# Patient Record
Sex: FEMALE | Race: BLACK OR AFRICAN AMERICAN | Employment: FULL TIME | URBAN - METROPOLITAN AREA
[De-identification: names, ages, dates, MRNs, and addresses within clinical notes are randomized per-mention and may not be internally consistent; named-entity substitution may affect disease eponyms.]

---

## 2017-01-05 ENCOUNTER — OFFICE VISIT (OUTPATIENT)
Dept: FAMILY MEDICINE CLINIC | Age: 37
End: 2017-01-05

## 2017-01-05 VITALS
HEART RATE: 76 BPM | TEMPERATURE: 98.6 F | HEIGHT: 68 IN | SYSTOLIC BLOOD PRESSURE: 169 MMHG | BODY MASS INDEX: 44.41 KG/M2 | OXYGEN SATURATION: 97 % | DIASTOLIC BLOOD PRESSURE: 100 MMHG | WEIGHT: 293 LBS | RESPIRATION RATE: 18 BRPM

## 2017-01-05 DIAGNOSIS — F41.9 ANXIETY: ICD-10-CM

## 2017-01-05 DIAGNOSIS — I10 ESSENTIAL HYPERTENSION: ICD-10-CM

## 2017-01-05 DIAGNOSIS — K52.9 GASTROENTERITIS: ICD-10-CM

## 2017-01-05 DIAGNOSIS — R73.03 PREDIABETES: Primary | ICD-10-CM

## 2017-01-05 DIAGNOSIS — F32.A DEPRESSIVE DISORDER: ICD-10-CM

## 2017-01-05 RX ORDER — FLUOXETINE 10 MG/1
10 CAPSULE ORAL DAILY
Qty: 7 CAP | Refills: 0 | Status: SHIPPED | OUTPATIENT
Start: 2017-01-05 | End: 2017-01-20

## 2017-01-05 RX ORDER — ALBUTEROL SULFATE 90 UG/1
1 AEROSOL, METERED RESPIRATORY (INHALATION)
Qty: 1 INHALER | Refills: 2 | Status: SHIPPED | OUTPATIENT
Start: 2017-01-05 | End: 2017-04-17 | Stop reason: SDUPTHER

## 2017-01-05 RX ORDER — FLUOXETINE HYDROCHLORIDE 20 MG/1
20 CAPSULE ORAL DAILY
Qty: 30 CAP | Refills: 0 | Status: SHIPPED | OUTPATIENT
Start: 2017-01-05 | End: 2017-01-20

## 2017-01-05 RX ORDER — CLONAZEPAM 1 MG/1
1 TABLET ORAL 2 TIMES DAILY
Qty: 30 TAB | Refills: 1 | Status: SHIPPED | OUTPATIENT
Start: 2017-01-05 | End: 2017-02-10 | Stop reason: SDUPTHER

## 2017-01-05 RX ORDER — PROMETHAZINE HYDROCHLORIDE 25 MG/1
25 TABLET ORAL
Qty: 10 TAB | Refills: 0 | Status: SHIPPED | OUTPATIENT
Start: 2017-01-05 | End: 2017-01-20

## 2017-01-05 NOTE — LETTER
NOTIFICATION RETURN TO WORK 
 
1/5/2017 12:18 PM 
 
Ms. Brooklyn Oakes [address] [id number] To Whom It May Concern: 
 
Brooklyn Oakes is currently under the care of [address]. Please excuse Ms. Pako Redd from missing work Thursday January 5th and Friday January 6th, 2016 due to illness. If there are questions or concerns please have the patient contact our office.  
 
 
 
Sincerely, 
 
 
Blade Rice NP

## 2017-01-05 NOTE — PROGRESS NOTES
Chief Complaint   Patient presents with    Nausea    Vomiting    Diarrhea    Depression     Patient states sx n/v,and diarrhea began Saturday;have been treating with kaopectate; and possible depression began one week ago; pt states a lot of stress in home and work; pt scored a 24 on depression screening. 1. Have you been to the ER, urgent care clinic since your last visit? Hospitalized since your last visit? No    2. Have you seen or consulted any other health care providers outside of the [address] [place] [address] since your last visit? Include any pap smears or colon screening.  No

## 2017-01-05 NOTE — MR AVS SNAPSHOT
Visit Information Date & Time Provider Department Dept. Phone Encounter #  
 1/5/2017 11:30 AM Karyn Burgos NP [address] [facility] [phone] [id number] Follow-up Instructions Return in about 1 week (around 1/12/2017) for recheck BP and follow up. Upcoming Health Maintenance Date Due Pneumococcal 19-64 Medium Risk (1 of 1 - PPSV23) 1/13/1998 DTaP/Tdap/Td series (1 - Tdap) 1/13/2000 PAP AKA CERVICAL CYTOLOGY 1/13/2000 Allergies as of 1/5/2017  Review Complete On: 1/5/2017 By: Karyn Burgos NP Severity Noted Reaction Type Reactions Bactrim [Sulfamethoprim Ds]  12/16/2013    Hives Current Immunizations  Reviewed on 1/21/2016 No immunizations on file. Not reviewed this visit You Were Diagnosed With   
  
 Codes Comments Prediabetes    -  Primary ICD-10-CM: R73.03 
ICD-9-CM: 790.29 Essential hypertension     ICD-10-CM: I10 
ICD-9-CM: 401.9 Depressive disorder     ICD-10-CM: F32.9 ICD-9-CM: 547 Anxiety     ICD-10-CM: F41.9 ICD-9-CM: 300.00 Gastroenteritis     ICD-10-CM: K52.9 ICD-9-CM: 558. 9 Vitals BP Pulse Temp Resp Height(growth percentile) Weight(growth percentile) (!) 169/100 (BP 1 Location: Left arm, BP Patient Position: Sitting) 76 98.6 °F (37 °C) (Oral) 18 5' 8\" (1.727 m) 316 lb (143.3 kg) LMP SpO2 BMI OB Status Smoking Status 12/06/2016 97% 48.05 kg/m2 Having regular periods Former Smoker Vitals History BMI and BSA Data Body Mass Index Body Surface Area 48.05 kg/m 2 2.62 m 2 Preferred Pharmacy Pharmacy Name Phone [id number], [address] [facility] [phone] Your Updated Medication List  
  
   
This list is accurate as of: 1/5/17 12:06 PM.  Always use your most recent med list.  
  
  
  
  
 albuterol 90 mcg/actuation inhaler Commonly known as:  VENTOLIN HFA  
 Take 1 Puff by inhalation every four (4) hours as needed for Wheezing. amLODIPine 5 mg tablet Commonly known as:  Hoyt Del Take 1 Tab by mouth daily. clonazePAM 1 mg tablet Commonly known as:  Shirley Herndon Take 1 Tab by mouth two (2) times a day. - must last 30 days * FLUoxetine 10 mg capsule Commonly known as:  PROzac Take 1 Cap by mouth daily. * FLUoxetine 20 mg capsule Commonly known as:  PROzac Take 1 Cap by mouth daily. promethazine 25 mg tablet Commonly known as:  PHENERGAN Take 1 Tab by mouth every eight (8) hours as needed for Nausea. * Notice: This list has 2 medication(s) that are the same as other medications prescribed for you. Read the directions carefully, and ask your doctor or other care provider to review them with you. Prescriptions Printed Refills  
 clonazePAM (KLONOPIN) 1 mg tablet 1 Sig: Take 1 Tab by mouth two (2) times a day. - must last 30 days Class: Print Route: Oral  
  
Prescriptions Sent to Pharmacy Refills FLUoxetine (PROZAC) 10 mg capsule 0 Sig: Take 1 Cap by mouth daily. Class: Normal  
 Pharmacy: [facility] Ph #: [phone] Route: Oral  
 FLUoxetine (PROZAC) 20 mg capsule 0 Sig: Take 1 Cap by mouth daily. Class: Normal  
 Pharmacy: [facility] Ph #: [phone] Route: Oral  
 albuterol (VENTOLIN HFA) 90 mcg/actuation inhaler 2 Sig: Take 1 Puff by inhalation every four (4) hours as needed for Wheezing. Class: Normal  
 Pharmacy: [facility] Ph #: [phone] Route: Inhalation  
 promethazine (PHENERGAN) 25 mg tablet 0 Sig: Take 1 Tab by mouth every eight (8) hours as needed for Nausea. Class: Normal  
 Pharmacy: [facility] Ph #: [phone]  Route: Oral  
  
 Follow-up Instructions Return in about 1 week (around 1/12/2017) for recheck BP and follow up. Patient Instructions Benzodiazepines: Potential side effects of benzodiazepine medications include, but are not limited to, the possibility of \"paradoxical agitation\" with irritability, aggressiveness or stimulated behavior; clumsiness, slurring of speech, dulled facies, psychomotor impairment, anterograde amnesia, impaired awareness of degree of drug effect, visual and hearing sensitivity impairment, other psychiatric/behavioral disturbances, impacts operating certain machinery or engaging in certain activities or employment, anxiety, insomnia, anorexia, tremor, nausea, vomiting, diarrhea and potential to develop tolerance, dependence, addiction and death from overdose. Use caution while taking benzodiazepines. There is a potential to overdose on this medication when too many pills are taken at one time. Do not mix alcohol with this medication because it can worsen side effects and be very dangerous. Fluoxetine (By mouth) Fluoxetine (kovg-FE-e-teen) Treats depression, obsessive-compulsive disorder (OCD), bulimia nervosa, and panic disorder. This medicine is an SSRI. Brand Name(s):FLUoxetine HCl, PROzac, PROzac Weekly, Sarafem There may be other brand names for this medicine. When This Medicine Should Not Be Used: This medicine is not right for everyone. Do not use it if you had an allergic reaction to fluoxetine. How to Use This Medicine:  
Capsule, Delayed Release Capsule, Liquid, Tablet · Take your medicine as directed. Your dose may need to be changed several times to find what works best for you. Take your medicine at the same time each day. · You may need to take this medicine for a month or longer before you feel better. If you feel that the medicine is not working well, do not take more than your normal dose. Tell your doctor. · Measure the oral liquid medicine with a marked measuring spoon, oral syringe, or medicine cup. · Delayed-release capsule: Swallow whole. Do not crush, break, or chew the capsule. · This medicine should come with a Medication Guide. Ask your pharmacist for a copy if you do not have one. · Missed dose:  
¨ Every day dose (Prozac® or Sarafem®): If you miss a dose or forget to take your medicine, take it as soon as you can. If it is almost time for your next dose, wait until then to take the medicine and skip the missed dose. Do not use extra medicine to make up for a missed dose. ¨ Once-a-week dose [facility]): If you miss a dose or forget to take your medicine, take it as soon as you can. Then go back to your regular schedule the next week. Do not use extra medicine to make up for a missed dose. · Store the medicine in a closed container at room temperature, away from heat, moisture, and direct light. Drugs and Foods to Avoid: Ask your doctor or pharmacist before using any other medicine, including over-the-counter medicines, vitamins, and herbal products. · Do not use this medicine together with pimozide or thioridazine. Do not use this medicine within 14 days of using an MAO inhibitor (MAOI), and do not start an MAOI for at least 5 weeks after you stop using fluoxetine. · Some foods and medicines can affect how fluoxetine works. Tell your doctor if you are using any of the following: 
¨ Buspirone, dolasetron, fentanyl, lithium, mefloquine, methadone, pentamidine, probucol, Denice's wort, tacrolimus, tramadol, tryptophan supplement, or vinblastine ¨ Other medicine to treat depression, medicine to treat migraine headaches, medicine to treat mental illness, an antibiotic, medicine for seizures (such as carbamazepine, phenytoin), a diuretic (water pill), medicine for heart rhythm problems, an NSAID pain or arthritis medicine (such as aspirin, celecoxib, diclofenac, ibuprofen, naproxen), or a blood thinner (such as warfarin) · Do not drink alcohol while you are using this medicine. · Tell your doctor if you use anything else that makes you sleepy. Some examples are allergy medicine, narcotic pain medicine, and alcohol. Warnings While Using This Medicine: · Tell your doctor if you are pregnant or breastfeeding, or if you have kidney disease, liver disease, bleeding problems, diabetes, or a history of seizures. Tell your doctor if you have had heart disease, a heart rhythm problem (such as QT prolongation), heart attack, heart failure, low blood pressure, or a stroke. · For some children, teenagers, and young adults, this medicine may increase mental or emotional problems. This may lead to thoughts of suicide and violence. Talk with your doctor right away if you have any thoughts or behavior changes that concern you. Tell your doctor if you or anyone in your family has a history of bipolar disorder or suicide attempts. · This medicine may cause the following problems: 
¨ Serotonin syndrome (may be life-threatening when used with certain other medicines) ¨ Higher risk of bleeding ¨ Low sodium levels in the blood ¨ Heart rhythm changes · Do not stop using this medicine suddenly. Your doctor will need to slowly decrease your dose before you stop it completely. · This medicine may make you dizzy or drowsy. Do not drive or do anything else that could be dangerous until you know how this medicine affects you. · Keep all medicine out of the reach of children. Never share your medicine with anyone. Possible Side Effects While Using This Medicine:  
Call your doctor right away if you notice any of these side effects: · Allergic reaction: Itching or hives, swelling in your face or hands, swelling or tingling in your mouth or throat, chest tightness, trouble breathing · Anxiety, restlessness, fever, sweating, muscle spasms, nausea, vomiting, diarrhea, seeing or hearing things that are not there · Confusion, weakness, and muscle twitching · Eye pain, trouble seeing, blurry vision · Fast, pounding, or uneven heartbeat, dizziness · Feeling more excited or energetic than usual, trouble sleeping · Seizures · Skin rash, blisters, peeling, or redness · Trouble breathing · Unusual behavior, thoughts of hurting yourself or others · Unusual bleeding or bruising If you notice these less serious side effects, talk with your doctor: · Mild trouble sleeping, shaking · Nausea, changes in appetite, weight gain or loss · Sleepiness or unusual drowsiness · Sexual problems · Unusual dreams If you notice other side effects that you think are caused by this medicine, tell your doctor. Call your doctor for medical advice about side effects. You may report side effects to FDA at 3-716-ZUV-9499 © 2016 [address] is for End User's use only and may not be sold, redistributed or otherwise used for commercial purposes. The above information is an  only. It is not intended as medical advice for individual conditions or treatments. Talk to your doctor, nurse or pharmacist before following any medical regimen to see if it is safe and effective for you. Introducing [facility]! [employer] introduces [employer] patient portal. Now you can access parts of your medical record, email your doctor's office, and request medication refills online. 1. In your internet browser, go to [email or web address] [email or web address] 2. Click on the First Time User? Click Here link in the Sign In box. You will see the New Member Sign Up page. 3. Enter your [employer] Access Code exactly as it appears below. You will not need to use this code after youve completed the sign-up process. If you do not sign up before the expiration date, you must request a new code. · [employer] Access Code: [email or web address] Expires: 4/5/2017 12:06 PM 
 
 4. Enter the last four digits of your Social Security Number (xxxx) and Date of Birth (mm/dd/yyyy) as indicated and click Submit. You will be taken to the next sign-up page. 5. Create a Ovuline ID. This will be your [employer] login ID and cannot be changed, so think of one that is secure and easy to remember. 6. Create a [employer] password. You can change your password at any time. 7. Enter your Password Reset Question and Answer. This can be used at a later time if you forget your password. 8. Enter your e-mail address. You will receive e-mail notification when new information is available in [address] 9. Click Sign Up. You can now view and download portions of your medical record. 10. Click the Download Summary menu link to download a portable copy of your medical information. If you have questions, please visit the Frequently Asked Questions section of the [employer] website. Remember, [employer] is NOT to be used for urgent needs. For medical emergencies, dial 911. Now available from your iPhone and Android! Please provide this summary of care documentation to your next provider. Your primary care clinician is listed as Lionel Betts. If you have any questions after today's visit, please call [phone].

## 2017-01-05 NOTE — PATIENT INSTRUCTIONS
Benzodiazepines: Potential side effects of benzodiazepine medications include, but are not limited to, the possibility of \"paradoxical agitation\" with irritability, aggressiveness or stimulated behavior; clumsiness, slurring of speech, dulled facies, psychomotor impairment, anterograde amnesia, impaired awareness of degree of drug effect, visual and hearing sensitivity impairment, other psychiatric/behavioral disturbances, impacts operating certain machinery or engaging in certain activities or employment, anxiety, insomnia, anorexia, tremor, nausea, vomiting, diarrhea and potential to develop tolerance, dependence, addiction and death from overdose. Use caution while taking benzodiazepines. There is a potential to overdose on this medication when too many pills are taken at one time. Do not mix alcohol with this medication because it can worsen side effects and be very dangerous. Fluoxetine (By mouth)   Fluoxetine (hlet-QS-v-teen)  Treats depression, obsessive-compulsive disorder (OCD), bulimia nervosa, and panic disorder. This medicine is an SSRI. Brand Name(s):FLUoxetine HCl, PROzac, PROzac Weekly, Sarafem   There may be other brand names for this medicine. When This Medicine Should Not Be Used: This medicine is not right for everyone. Do not use it if you had an allergic reaction to fluoxetine. How to Use This Medicine:   Capsule, Delayed Release Capsule, Liquid, Tablet  · Take your medicine as directed. Your dose may need to be changed several times to find what works best for you. Take your medicine at the same time each day. · You may need to take this medicine for a month or longer before you feel better. If you feel that the medicine is not working well, do not take more than your normal dose. Tell your doctor. · Measure the oral liquid medicine with a marked measuring spoon, oral syringe, or medicine cup. · Delayed-release capsule: Swallow whole.  Do not crush, break, or chew the capsule. · This medicine should come with a Medication Guide. Ask your pharmacist for a copy if you do not have one. · Missed dose:   ¨ Every day dose (Prozac® or Sarafem®): If you miss a dose or forget to take your medicine, take it as soon as you can. If it is almost time for your next dose, wait until then to take the medicine and skip the missed dose. Do not use extra medicine to make up for a missed dose. ¨ Once-a-week dose [facility]): If you miss a dose or forget to take your medicine, take it as soon as you can. Then go back to your regular schedule the next week. Do not use extra medicine to make up for a missed dose. · Store the medicine in a closed container at room temperature, away from heat, moisture, and direct light. Drugs and Foods to Avoid:   Ask your doctor or pharmacist before using any other medicine, including over-the-counter medicines, vitamins, and herbal products. · Do not use this medicine together with pimozide or thioridazine. Do not use this medicine within 14 days of using an MAO inhibitor (MAOI), and do not start an MAOI for at least 5 weeks after you stop using fluoxetine. · Some foods and medicines can affect how fluoxetine works. Tell your doctor if you are using any of the following:  ¨ Buspirone, dolasetron, fentanyl, lithium, mefloquine, methadone, pentamidine, probucol, Denice's wort, tacrolimus, tramadol, tryptophan supplement, or vinblastine  ¨ Other medicine to treat depression, medicine to treat migraine headaches, medicine to treat mental illness, an antibiotic, medicine for seizures (such as carbamazepine, phenytoin), a diuretic (water pill), medicine for heart rhythm problems, an NSAID pain or arthritis medicine (such as aspirin, celecoxib, diclofenac, ibuprofen, naproxen), or a blood thinner (such as warfarin)  · Do not drink alcohol while you are using this medicine. · Tell your doctor if you use anything else that makes you sleepy.  Some examples are allergy medicine, narcotic pain medicine, and alcohol. Warnings While Using This Medicine:   · Tell your doctor if you are pregnant or breastfeeding, or if you have kidney disease, liver disease, bleeding problems, diabetes, or a history of seizures. Tell your doctor if you have had heart disease, a heart rhythm problem (such as QT prolongation), heart attack, heart failure, low blood pressure, or a stroke. · For some children, teenagers, and young adults, this medicine may increase mental or emotional problems. This may lead to thoughts of suicide and violence. Talk with your doctor right away if you have any thoughts or behavior changes that concern you. Tell your doctor if you or anyone in your family has a history of bipolar disorder or suicide attempts. · This medicine may cause the following problems:  ¨ Serotonin syndrome (may be life-threatening when used with certain other medicines)  ¨ Higher risk of bleeding  ¨ Low sodium levels in the blood  ¨ Heart rhythm changes  · Do not stop using this medicine suddenly. Your doctor will need to slowly decrease your dose before you stop it completely. · This medicine may make you dizzy or drowsy. Do not drive or do anything else that could be dangerous until you know how this medicine affects you. · Keep all medicine out of the reach of children. Never share your medicine with anyone.   Possible Side Effects While Using This Medicine:   Call your doctor right away if you notice any of these side effects:  · Allergic reaction: Itching or hives, swelling in your face or hands, swelling or tingling in your mouth or throat, chest tightness, trouble breathing  · Anxiety, restlessness, fever, sweating, muscle spasms, nausea, vomiting, diarrhea, seeing or hearing things that are not there  · Confusion, weakness, and muscle twitching  · Eye pain, trouble seeing, blurry vision  · Fast, pounding, or uneven heartbeat, dizziness  · Feeling more excited or energetic than usual, trouble sleeping  · Seizures  · Skin rash, blisters, peeling, or redness  · Trouble breathing  · Unusual behavior, thoughts of hurting yourself or others  · Unusual bleeding or bruising  If you notice these less serious side effects, talk with your doctor:   · Mild trouble sleeping, shaking  · Nausea, changes in appetite, weight gain or loss  · Sleepiness or unusual drowsiness  · Sexual problems  · Unusual dreams  If you notice other side effects that you think are caused by this medicine, tell your doctor. Call your doctor for medical advice about side effects. You may report side effects to FDA at 4-181-FDA-0718  © 2016 [address] is for End User's use only and may not be sold, redistributed or otherwise used for commercial purposes. The above information is an  only. It is not intended as medical advice for individual conditions or treatments. Talk to your doctor, nurse or pharmacist before following any medical regimen to see if it is safe and effective for you.

## 2017-01-05 NOTE — PROGRESS NOTES
Chief Complaint   Patient presents with    Nausea    Vomiting    Diarrhea    Depression     Patient states sx n/v,and diarrhea began Saturday; pt has been treating with kaopectate; Also reporting possible depression which began one week ago; pt states a lot of stress in home and work; pt scored a 24 on depression screening. 1. Have you been to the ER, urgent care clinic since your last visit? Hospitalized since your last visit? No    2. Have you seen or consulted any other health care providers outside of the [address] [place] [address] since your last visit? Include any pap smears or colon screening. No    Was previously prescribed zoloft by Dr. Abdi Lim but pt stopped taking. Also not taking her klonopin, states it was not helping. Situational depression. Starting to interfere with work. Difficulty sleeping. Has a teenager who is acting out. Daughter called the  on her for an assault charge. Spent a night in long-term. Daughter is 23years old and still lives at home. Denies any family history of depression. Sx started on Saturday with diarrhea, nausea, and mild vomiting. Denies any sick contacts. Denies any fever or chills. Persistent nausea. Last BM was this morning. A little loose but improved in comparison. Has been taking kaopectate OTC. Denies any blood in the stool. Drinking gatorade. Denies any sx of dehydration. Denies any other concerns at this time. Chief Complaint   Patient presents with    Nausea    Vomiting    Diarrhea    Depression     she is a 39y.o. year old female who presents for evalution. Reviewed PmHx, RxHx, FmHx, SocHx, AllgHx and updated and dated in the chart.     Review of Systems - negative except as listed above in the HPI    Objective:     Vitals:    01/05/17 1144 01/05/17 1146   BP: (!) 153/108 (!) 169/100   Pulse: 76    Resp: 18    Temp: 98.6 °F (37 °C)    TempSrc: Oral    SpO2: 97%    Weight: 316 lb (143.3 kg)    Height: 5' 8\" (1.727 m)      Physical Examination: General appearance - alert, well appearing, and in no distress  Mental status - anxious  Eyes - pupils equal and reactive, extraocular eye movements intact  Ears - bilateral TM's and external ear canals normal  Nose - normal and patent, no erythema, discharge or polyps and normal nontender sinuses  Mouth - mucous membranes moist, pharynx normal without lesions  Neck - supple, no significant adenopathy, thyroid exam: thyroid is normal in size without nodules or tenderness  Chest - clear to auscultation, no wheezes, rales or rhonchi, symmetric air entry  Heart - normal rate, regular rhythm, normal S1, S2, no murmurss  Abdomen - soft, nontender, nondistended, no masses or organomegaly  bowel sounds normal  no CVA tenderness  Extremities - peripheral pulses normal, no ankle edema, no clubbing or cyanosis  Skin - normal coloration and turgor, no rashes, no suspicious skin lesions noted    Assessment/ Plan:   Vero was seen today for nausea, vomiting, diarrhea and depression. Diagnoses and all orders for this visit:    Prediabetes  -     HEMOGLOBIN A1C WITH EAG  Will notify results and deviate plan based on findings. Essential hypertension  -     METABOLIC PANEL, COMPREHENSIVE  -     CBC WITH AUTOMATED DIFF  Attributing to not feeling well. Rec 1 week follow up to recheck. Depressive disorder  -     TSH 3RD GENERATION  -     FLUoxetine (PROZAC) 10 mg capsule; Take 1 Cap by mouth daily.  -     FLUoxetine (PROZAC) 20 mg capsule; Take 1 Cap by mouth daily. Start prozac daily. Reviewed SEs/aDRS of medication. Follow up if sx persist or worsen or if medication SEs intolerable to discuss alternatives. Anxiety  -     clonazePAM (KLONOPIN) 1 mg tablet; Take 1 Tab by mouth two (2) times a day. - must last 30 days  Refilled rx. Continue using sparingly as needed for acute sx of anxiety only. Reinforced SEs/ADRS of medication and how to take responsibly.    Gastroenteritis  -     promethazine (PHENERGAN) 25 mg tablet; Take 1 Tab by mouth every eight (8) hours as needed for Nausea. PRN phenergan. Reviewed supportive measures. Advance diet slowly as tolerated. Follow up if sx persist or worsen. Other orders  -     albuterol (VENTOLIN HFA) 90 mcg/actuation inhaler; Take 1 Puff by inhalation every four (4) hours as needed for Wheezing. Follow-up Disposition:  Return in about 1 week (around 1/12/2017) for recheck BP and follow up. I have discussed the diagnosis with the patient and the intended plan as seen in the above orders. The patient has received an after-visit summary and questions were answered concerning future plans. Medication Side Effects and Warnings were discussed with patient: yes  Patient Labs were reviewed and or requested: yes  Patient Past Records were reviewed and or requested  yes  Patient / Caregiver Understanding of treatment plan was verbalized during office visit YES    AUSTIN Tubbs    Patient Instructions   Benzodiazepines: Potential side effects of benzodiazepine medications include, but are not limited to, the possibility of \"paradoxical agitation\" with irritability, aggressiveness or stimulated behavior; clumsiness, slurring of speech, dulled facies, psychomotor impairment, anterograde amnesia, impaired awareness of degree of drug effect, visual and hearing sensitivity impairment, other psychiatric/behavioral disturbances, impacts operating certain machinery or engaging in certain activities or employment, anxiety, insomnia, anorexia, tremor, nausea, vomiting, diarrhea and potential to develop tolerance, dependence, addiction and death from overdose. Use caution while taking benzodiazepines. There is a potential to overdose on this medication when too many pills are taken at one time. Do not mix alcohol with this medication because it can worsen side effects and be very dangerous.     Fluoxetine (By mouth)   Fluoxetine (yvpv-XL-a-teen)  Treats depression, obsessive-compulsive disorder (OCD), bulimia nervosa, and panic disorder. This medicine is an SSRI. Brand Name(s):FLUoxetine HCl, PROzac, PROzac Weekly, Sarafem   There may be other brand names for this medicine. When This Medicine Should Not Be Used: This medicine is not right for everyone. Do not use it if you had an allergic reaction to fluoxetine. How to Use This Medicine:   Capsule, Delayed Release Capsule, Liquid, Tablet  · Take your medicine as directed. Your dose may need to be changed several times to find what works best for you. Take your medicine at the same time each day. · You may need to take this medicine for a month or longer before you feel better. If you feel that the medicine is not working well, do not take more than your normal dose. Tell your doctor. · Measure the oral liquid medicine with a marked measuring spoon, oral syringe, or medicine cup. · Delayed-release capsule: Swallow whole. Do not crush, break, or chew the capsule. · This medicine should come with a Medication Guide. Ask your pharmacist for a copy if you do not have one. · Missed dose:   ¨ Every day dose (Prozac® or Sarafem®): If you miss a dose or forget to take your medicine, take it as soon as you can. If it is almost time for your next dose, wait until then to take the medicine and skip the missed dose. Do not use extra medicine to make up for a missed dose. ¨ Once-a-week dose [facility]): If you miss a dose or forget to take your medicine, take it as soon as you can. Then go back to your regular schedule the next week. Do not use extra medicine to make up for a missed dose. · Store the medicine in a closed container at room temperature, away from heat, moisture, and direct light. Drugs and Foods to Avoid:   Ask your doctor or pharmacist before using any other medicine, including over-the-counter medicines, vitamins, and herbal products. · Do not use this medicine together with pimozide or thioridazine.  Do not use this medicine within 14 days of using an MAO inhibitor (MAOI), and do not start an MAOI for at least 5 weeks after you stop using fluoxetine. · Some foods and medicines can affect how fluoxetine works. Tell your doctor if you are using any of the following:  ¨ Buspirone, dolasetron, fentanyl, lithium, mefloquine, methadone, pentamidine, probucol, Denice's wort, tacrolimus, tramadol, tryptophan supplement, or vinblastine  ¨ Other medicine to treat depression, medicine to treat migraine headaches, medicine to treat mental illness, an antibiotic, medicine for seizures (such as carbamazepine, phenytoin), a diuretic (water pill), medicine for heart rhythm problems, an NSAID pain or arthritis medicine (such as aspirin, celecoxib, diclofenac, ibuprofen, naproxen), or a blood thinner (such as warfarin)  · Do not drink alcohol while you are using this medicine. · Tell your doctor if you use anything else that makes you sleepy. Some examples are allergy medicine, narcotic pain medicine, and alcohol. Warnings While Using This Medicine:   · Tell your doctor if you are pregnant or breastfeeding, or if you have kidney disease, liver disease, bleeding problems, diabetes, or a history of seizures. Tell your doctor if you have had heart disease, a heart rhythm problem (such as QT prolongation), heart attack, heart failure, low blood pressure, or a stroke. · For some children, teenagers, and young adults, this medicine may increase mental or emotional problems. This may lead to thoughts of suicide and violence. Talk with your doctor right away if you have any thoughts or behavior changes that concern you. Tell your doctor if you or anyone in your family has a history of bipolar disorder or suicide attempts.   · This medicine may cause the following problems:  ¨ Serotonin syndrome (may be life-threatening when used with certain other medicines)  ¨ Higher risk of bleeding  ¨ Low sodium levels in the blood  ¨ Heart rhythm changes  · Do not stop using this medicine suddenly. Your doctor will need to slowly decrease your dose before you stop it completely. · This medicine may make you dizzy or drowsy. Do not drive or do anything else that could be dangerous until you know how this medicine affects you. · Keep all medicine out of the reach of children. Never share your medicine with anyone. Possible Side Effects While Using This Medicine:   Call your doctor right away if you notice any of these side effects:  · Allergic reaction: Itching or hives, swelling in your face or hands, swelling or tingling in your mouth or throat, chest tightness, trouble breathing  · Anxiety, restlessness, fever, sweating, muscle spasms, nausea, vomiting, diarrhea, seeing or hearing things that are not there  · Confusion, weakness, and muscle twitching  · Eye pain, trouble seeing, blurry vision  · Fast, pounding, or uneven heartbeat, dizziness  · Feeling more excited or energetic than usual, trouble sleeping  · Seizures  · Skin rash, blisters, peeling, or redness  · Trouble breathing  · Unusual behavior, thoughts of hurting yourself or others  · Unusual bleeding or bruising  If you notice these less serious side effects, talk with your doctor:   · Mild trouble sleeping, shaking  · Nausea, changes in appetite, weight gain or loss  · Sleepiness or unusual drowsiness  · Sexual problems  · Unusual dreams  If you notice other side effects that you think are caused by this medicine, tell your doctor. Call your doctor for medical advice about side effects. You may report side effects to FDA at 9-343-FDA-3787  © 2016 [address] is for End User's use only and may not be sold, redistributed or otherwise used for commercial purposes. The above information is an  only. It is not intended as medical advice for individual conditions or treatments.  Talk to your doctor, nurse or pharmacist before following any medical regimen to see if it is safe and effective for you.

## 2017-01-06 DIAGNOSIS — R73.03 PREDIABETES: Primary | ICD-10-CM

## 2017-01-06 LAB
ALBUMIN SERPL-MCNC: 4.1 G/DL (ref 3.5–5.5)
ALBUMIN/GLOB SERPL: 1.4 {RATIO} (ref 1.1–2.5)
ALP SERPL-CCNC: 67 IU/L (ref 39–117)
ALT SERPL-CCNC: 15 IU/L (ref 0–32)
AST SERPL-CCNC: 15 IU/L (ref 0–40)
BASOPHILS # BLD AUTO: 0 X10E3/UL (ref 0–0.2)
BASOPHILS NFR BLD AUTO: 0 %
BILIRUB SERPL-MCNC: 0.4 MG/DL (ref 0–1.2)
BUN SERPL-MCNC: 6 MG/DL (ref 6–20)
BUN/CREAT SERPL: 10 (ref 8–20)
CALCIUM SERPL-MCNC: 9 MG/DL (ref 8.7–10.2)
CHLORIDE SERPL-SCNC: 99 MMOL/L (ref 96–106)
CO2 SERPL-SCNC: 24 MMOL/L (ref 18–29)
CREAT SERPL-MCNC: 0.6 MG/DL (ref 0.57–1)
EOSINOPHIL # BLD AUTO: 0.1 X10E3/UL (ref 0–0.4)
EOSINOPHIL NFR BLD AUTO: 1 %
ERYTHROCYTE [DISTWIDTH] IN BLOOD BY AUTOMATED COUNT: 16.8 % (ref 12.3–15.4)
EST. AVERAGE GLUCOSE BLD GHB EST-MCNC: 131 MG/DL
GLOBULIN SER CALC-MCNC: 2.9 G/DL (ref 1.5–4.5)
GLUCOSE SERPL-MCNC: 86 MG/DL (ref 65–99)
HBA1C MFR BLD: 6.2 % (ref 4.8–5.6)
HCT VFR BLD AUTO: 36 % (ref 34–46.6)
HGB BLD-MCNC: 11.3 G/DL (ref 11.1–15.9)
IMM GRANULOCYTES # BLD: 0 X10E3/UL (ref 0–0.1)
IMM GRANULOCYTES NFR BLD: 0 %
LYMPHOCYTES # BLD AUTO: 2.1 X10E3/UL (ref 0.7–3.1)
LYMPHOCYTES NFR BLD AUTO: 22 %
MCH RBC QN AUTO: 24.8 PG (ref 26.6–33)
MCHC RBC AUTO-ENTMCNC: 31.4 G/DL (ref 31.5–35.7)
MCV RBC AUTO: 79 FL (ref 79–97)
MONOCYTES # BLD AUTO: 0.5 X10E3/UL (ref 0.1–0.9)
MONOCYTES NFR BLD AUTO: 5 %
NEUTROPHILS # BLD AUTO: 7.1 X10E3/UL (ref 1.4–7)
NEUTROPHILS NFR BLD AUTO: 72 %
PLATELET # BLD AUTO: 370 X10E3/UL (ref 150–379)
POTASSIUM SERPL-SCNC: 3.5 MMOL/L (ref 3.5–5.2)
PROT SERPL-MCNC: 7 G/DL (ref 6–8.5)
RBC # BLD AUTO: 4.55 X10E6/UL (ref 3.77–5.28)
SODIUM SERPL-SCNC: 140 MMOL/L (ref 134–144)
TSH SERPL DL<=0.005 MIU/L-ACNC: 2.52 UIU/ML (ref 0.45–4.5)
WBC # BLD AUTO: 10 X10E3/UL (ref 3.4–10.8)

## 2017-01-06 RX ORDER — METFORMIN HYDROCHLORIDE 500 MG/1
500 TABLET, EXTENDED RELEASE ORAL
Qty: 90 TAB | Refills: 1 | Status: SHIPPED | OUTPATIENT
Start: 2017-01-06 | End: 2017-05-09 | Stop reason: ALTCHOICE

## 2017-01-06 NOTE — PROGRESS NOTES
Please notify pt the followin. Her hemoglobin a1c shows that her prediabetes is almost considered to be type 2 diabetes. Therefore I would like pt to start metformin with dinner. Rx sent to pharmacy on file. I recommend she follow pu in 3 months to repeat labs. Needs to work on diet, will send recs on how to do so. 2. Normal thyroid function. All other labs are normal. Sending letter with results and recommendations. Start metformin with dinner and follow up in 3 months to repeat fasting labs.

## 2017-01-19 ENCOUNTER — TELEPHONE (OUTPATIENT)
Dept: FAMILY MEDICINE CLINIC | Age: 37
End: 2017-01-19

## 2017-01-19 NOTE — TELEPHONE ENCOUNTER
Attempted to contact patient; unable to leave vm on mobile due to mailbox is full; pt needs to reschedule appt on Friday @ 11am ; due to provider will be out of office at this time; please assist patient in rescheduling.

## 2017-01-20 ENCOUNTER — OFFICE VISIT (OUTPATIENT)
Dept: FAMILY MEDICINE CLINIC | Age: 37
End: 2017-01-20

## 2017-01-20 VITALS
TEMPERATURE: 98.8 F | WEIGHT: 293 LBS | HEIGHT: 68 IN | BODY MASS INDEX: 44.41 KG/M2 | HEART RATE: 83 BPM | RESPIRATION RATE: 18 BRPM | SYSTOLIC BLOOD PRESSURE: 144 MMHG | OXYGEN SATURATION: 97 % | DIASTOLIC BLOOD PRESSURE: 88 MMHG

## 2017-01-20 DIAGNOSIS — M79.645 FINGER PAIN, LEFT: ICD-10-CM

## 2017-01-20 DIAGNOSIS — R73.03 PREDIABETES: Primary | ICD-10-CM

## 2017-01-20 DIAGNOSIS — I10 ESSENTIAL HYPERTENSION: ICD-10-CM

## 2017-01-20 NOTE — MR AVS SNAPSHOT
Visit Information Date & Time Provider Department Dept. Phone Encounter #  
 1/20/2017 11:00 AM Blade Rice NP [address] [facility] [phone] [id number] Follow-up Instructions Return in about 3 months (around 4/20/2017) for repeat labs and follow up. Upcoming Health Maintenance Date Due Pneumococcal 19-64 Medium Risk (1 of 1 - PPSV23) 1/13/1998 DTaP/Tdap/Td series (1 - Tdap) 1/13/2000 PAP AKA CERVICAL CYTOLOGY 1/13/2000 Allergies as of 1/20/2017  Review Complete On: 1/20/2017 By: Blade Rice NP Severity Noted Reaction Type Reactions Bactrim [Sulfamethoprim Ds]  12/16/2013    Hives Current Immunizations  Reviewed on 1/21/2016 No immunizations on file. Not reviewed this visit You Were Diagnosed With   
  
 Codes Comments Prediabetes    -  Primary ICD-10-CM: R73.03 
ICD-9-CM: 790.29 Essential hypertension     ICD-10-CM: I10 
ICD-9-CM: 401.9 Finger pain, left     ICD-10-CM: Z04.172 ICD-9-CM: 729.5 Vitals BP Pulse Temp Resp Height(growth percentile) Weight(growth percentile) 144/88 (BP 1 Location: Right arm, BP Patient Position: Sitting) 83 98.8 °F (37.1 °C) (Oral) 18 5' 8\" (1.727 m) 317 lb (143.8 kg) LMP SpO2 BMI OB Status Smoking Status 01/13/2017 97% 48.2 kg/m2 Having regular periods Former Smoker BMI and BSA Data Body Mass Index Body Surface Area  
 48.2 kg/m 2 2.63 m 2 Preferred Pharmacy Pharmacy Name Phone [id number], [address] [facility] [phone] Your Updated Medication List  
  
   
This list is accurate as of: 1/20/17 11:36 AM.  Always use your most recent med list.  
  
  
  
  
 albuterol 90 mcg/actuation inhaler Commonly known as:  VENTOLIN HFA Take 1 Puff by inhalation every four (4) hours as needed for Wheezing. amLODIPine 5 mg tablet Commonly known as:  Suzon Salle Take 1 Tab by mouth daily. clonazePAM 1 mg tablet Commonly known as:  Geovanni Aver Take 1 Tab by mouth two (2) times a day. - must last 30 days  
  
 metFORMIN  mg tablet Commonly known as:  GLUCOPHAGE XR Take 1 Tab by mouth daily (with dinner). Follow-up Instructions Return in about 3 months (around 4/20/2017) for repeat labs and follow up. To-Do List   
 01/20/2017 Imaging:  XR 2ND FINGER LT MIN 2 V Patient Instructions Try eating a high protein breakfast. I recommend nonfat greek yogurt every morning with a small amount of the trail mix or granola. I recommend eating an apple as a mid morning snack. You can add a little bit of peanut butter for the extra protein. For lunch I recommend a protein like fish, chicken, turkey breast, etc. If you are going to have a sandwich try whole grain bread and start with half a sandwich to see if that satiates you. You can put as many veggies on it as you want like onion, tomato, lettuce. Try mustard instead of villa because villa is high in fat. If you need a crunchy/salty side try pop chips or santiago chips. They are less fattening that potato chips. For a mid afternoon snack I recommend carrots with humus. Humus is so good for you and so filling due to the high protein. For dinner stick with the protein like fish, chicken or turkey. For starch I recommend quinoa, brown rice, sweet potato, or cous cous. Always eat a vegetable with your dinner. Spinach and kale are great because they fill you up and are full of vitamins and minerals. I know that its difficult to stop craving sweets in the evening. For this I recommend purple or green grapes because they will curb your craving. Another idea is any of those \"100 calorie\" desserts like skinny cow. Just make sure you only have one. Drink as much water as you possibly can all day long. Try adding fresh slices of lemon to curb cravings. Also purchase kellen seeds. If you sprinkle them on your food, they \"blow up\" in the stomach and make you feel anderson faster. I usually put in on yogurt or peanut butter toast. You can really put it on anything and you can find them pretty much everywhere these days. You should join [employer] at www.pinterest.com. They have an endless supply of healthy recipes. Prediabetes: Care Instructions Your Care Instructions Prediabetes is a warning sign that you are at risk for getting type 2 diabetes. It means that your blood sugar is higher than it should be. The food you eat turns into sugar, which your body uses for energy. Normally, an organ called the pancreas makes insulin, which allows the sugar in your blood to get into your body's cells. But when your body can't use insulin the right way, the sugar doesn't move into cells. It stays in your blood instead. This is called insulin resistance. The buildup of sugar in the blood causes prediabetes. The good news is that lifestyle changes may help you get your blood sugar back to normal and help you avoid or delay diabetes. Follow-up care is a key part of your treatment and safety. Be sure to make and go to all appointments, and call your doctor if you are having problems. It's also a good idea to know your test results and keep a list of the medicines you take. How can you care for yourself at home? · Watch your weight. A healthy weight helps your body use insulin properly. · Limit the amount of calories, sweets, and unhealthy fat you eat. Ask your doctor if you should see a dietitian. A registered dietitian can help you create meal plans that fit your lifestyle. · Get at least 30 minutes of exercise on most days of the week. Exercise helps control your blood sugar. It also helps you maintain a healthy weight. Walking is a good choice. You also may want to do other activities, such as running, swimming, cycling, or playing tennis or team sports. · Do not smoke. Smoking can make prediabetes worse. If you need help quitting, talk to your doctor about stop-smoking programs and medicines. These can increase your chances of quitting for good. · If your doctor prescribed medicines, take them exactly as prescribed. Call your doctor if you think you are having a problem with your medicine. You will get more details on the specific medicines your doctor prescribes. When should you call for help? Watch closely for changes in your health, and be sure to contact your doctor if: 
· You have any symptoms of diabetes. These may include: ¨ Being thirsty more often. ¨ Urinating more. ¨ Being hungrier. ¨ Losing weight. ¨ Being very tired. ¨ Having blurry vision. · You have a wound that will not heal. 
· You have an infection that will not go away. · You have problems with your blood pressure. · You want more information about diabetes and how you can keep from getting it. Where can you learn more? Go to http://patricia-nan.info/. Enter I222 in the search box to learn more about \"Prediabetes: Care Instructions. \" Current as of: May 23, 2016 Content Version: 11.1 © 7424-9804 [employer]. Care instructions adapted under license by [employer] (which disclaims liability or warranty for this information). If you have questions about a medical condition or this instruction, always ask your healthcare professional. [address] any warranty or liability for your use of this information. Introducing [facility]! [employer] introduces [employer] patient portal. Now you can access parts of your medical record, email your doctor's office, and request medication refills online. 1. In your internet browser, go to [email or web address] 2. Click on the First Time User? Click Here link in the Sign In box. You will see the New Member Sign Up page. 3. Enter your [employer] Access Code exactly as it appears below. You will not need to use this code after youve completed the sign-up process. If you do not sign up before the expiration date, you must request a new code. · [employer] Access Code: [email or web address] Expires: 4/5/2017 12:06 PM 
 
4. Enter the last four digits of your Social Security Number (xxxx) and Date of Birth (mm/dd/yyyy) as indicated and click Submit. You will be taken to the next sign-up page. 5. Create a [employer] ID. This will be your [employer] login ID and cannot be changed, so think of one that is secure and easy to remember. 6. Create a [employer] password. You can change your password at any time. 7. Enter your Password Reset Question and Answer. This can be used at a later time if you forget your password. 8. Enter your e-mail address. You will receive e-mail notification when new information is available in [address]. 9. Click Sign Up. You can now view and download portions of your medical record. 10. Click the Download Summary menu link to download a portable copy of your medical information. If you have questions, please visit the Frequently Asked Questions section of the [employer] website. Remember, [employer] is NOT to be used for urgent needs. For medical emergencies, dial 911. Now available from your iPhone and Android! Please provide this summary of care documentation to your next provider. Your primary care clinician is listed as He Alvarado. If you have any questions after today's visit, please call [phone].

## 2017-02-10 ENCOUNTER — TELEPHONE (OUTPATIENT)
Dept: FAMILY MEDICINE CLINIC | Age: 37
End: 2017-02-10

## 2017-02-10 DIAGNOSIS — F41.9 ANXIETY: ICD-10-CM

## 2017-02-10 RX ORDER — CLONAZEPAM 1 MG/1
1 TABLET ORAL
Qty: 30 TAB | Refills: 0 | OUTPATIENT
Start: 2017-02-10 | End: 2017-03-21

## 2017-02-10 NOTE — TELEPHONE ENCOUNTER
Needs to be more careful with medication in the future. Will refill early this once.  Please call in rx to pharmacy on file, may need to speak with pharmacist since it is an early refill to explain that rx was lost.

## 2017-02-10 NOTE — TELEPHONE ENCOUNTER
Pt left her Clonazepam on her desk at work, someone took them, pt is asking how can she get a new rx. # [phone].

## 2017-02-17 ENCOUNTER — APPOINTMENT (OUTPATIENT)
Dept: CT IMAGING | Age: 37
End: 2017-02-17
Attending: EMERGENCY MEDICINE
Payer: MEDICAID

## 2017-02-17 ENCOUNTER — HOSPITAL ENCOUNTER (EMERGENCY)
Age: 37
Discharge: HOME | End: 2017-02-17
Attending: EMERGENCY MEDICINE
Payer: MEDICAID

## 2017-02-17 VITALS
TEMPERATURE: 98.6 F | DIASTOLIC BLOOD PRESSURE: 64 MMHG | RESPIRATION RATE: 20 BRPM | SYSTOLIC BLOOD PRESSURE: 138 MMHG | OXYGEN SATURATION: 96 % | BODY MASS INDEX: 44.41 KG/M2 | HEART RATE: 82 BPM | WEIGHT: 293 LBS | HEIGHT: 68 IN

## 2017-02-17 DIAGNOSIS — K64.9 HEMORRHOIDS, UNSPECIFIED HEMORRHOID TYPE: ICD-10-CM

## 2017-02-17 DIAGNOSIS — R10.32 ABDOMINAL PAIN, LLQ (LEFT LOWER QUADRANT): Primary | ICD-10-CM

## 2017-02-17 LAB
ALBUMIN SERPL BCP-MCNC: 3.5 G/DL (ref 3.5–5)
ALBUMIN/GLOB SERPL: 0.8 {RATIO} (ref 1.1–2.2)
ALP SERPL-CCNC: 76 U/L (ref 45–117)
ALT SERPL-CCNC: 18 U/L (ref 12–78)
ANION GAP BLD CALC-SCNC: 2 MMOL/L (ref 5–15)
AST SERPL W P-5'-P-CCNC: 11 U/L (ref 15–37)
BASOPHILS # BLD AUTO: 0 K/UL (ref 0–0.1)
BASOPHILS # BLD: 0 % (ref 0–1)
BILIRUB SERPL-MCNC: 0.3 MG/DL (ref 0.2–1)
BUN SERPL-MCNC: 10 MG/DL (ref 6–20)
BUN/CREAT SERPL: 13 (ref 12–20)
CALCIUM SERPL-MCNC: 8.8 MG/DL (ref 8.5–10.1)
CHLORIDE SERPL-SCNC: 102 MMOL/L (ref 97–108)
CO2 SERPL-SCNC: 29 MMOL/L (ref 21–32)
CREAT SERPL-MCNC: 0.8 MG/DL (ref 0.55–1.02)
DIFFERENTIAL METHOD BLD: ABNORMAL
EOSINOPHIL # BLD: 0.3 K/UL (ref 0–0.4)
EOSINOPHIL NFR BLD: 2 % (ref 0–7)
ERYTHROCYTE [DISTWIDTH] IN BLOOD BY AUTOMATED COUNT: 18 % (ref 11.5–14.5)
GLOBULIN SER CALC-MCNC: 4.6 G/DL (ref 2–4)
GLUCOSE SERPL-MCNC: 90 MG/DL (ref 65–100)
HCG UR QL: NEGATIVE
HCT VFR BLD AUTO: 37.9 % (ref 35–47)
HEMOCCULT STL QL: NEGATIVE
HGB BLD-MCNC: 11.6 G/DL (ref 11.5–16)
LYMPHOCYTES # BLD AUTO: 25 % (ref 12–49)
LYMPHOCYTES # BLD: 3.3 K/UL (ref 0.8–3.5)
MCH RBC QN AUTO: 24.8 PG (ref 26–34)
MCHC RBC AUTO-ENTMCNC: 30.6 G/DL (ref 30–36.5)
MCV RBC AUTO: 81.2 FL (ref 80–99)
MONOCYTES # BLD: 0.8 K/UL (ref 0–1)
MONOCYTES NFR BLD AUTO: 6 % (ref 5–13)
NEUTS SEG # BLD: 8.7 K/UL (ref 1.8–8)
NEUTS SEG NFR BLD AUTO: 67 % (ref 32–75)
PLATELET # BLD AUTO: 392 K/UL (ref 150–400)
POTASSIUM SERPL-SCNC: 3.1 MMOL/L (ref 3.5–5.1)
PROT SERPL-MCNC: 8.1 G/DL (ref 6.4–8.2)
RBC # BLD AUTO: 4.67 M/UL (ref 3.8–5.2)
SODIUM SERPL-SCNC: 133 MMOL/L (ref 136–145)
WBC # BLD AUTO: 13.1 K/UL (ref 3.6–11)

## 2017-02-17 PROCEDURE — 96361 HYDRATE IV INFUSION ADD-ON: CPT

## 2017-02-17 PROCEDURE — 82270 OCCULT BLOOD FECES: CPT

## 2017-02-17 PROCEDURE — 81025 URINE PREGNANCY TEST: CPT

## 2017-02-17 PROCEDURE — 74011250636 HC RX REV CODE- 250/636: Performed by: EMERGENCY MEDICINE

## 2017-02-17 PROCEDURE — 74177 CT ABD & PELVIS W/CONTRAST: CPT

## 2017-02-17 PROCEDURE — 74011636320 HC RX REV CODE- 636/320: Performed by: EMERGENCY MEDICINE

## 2017-02-17 PROCEDURE — 86900 BLOOD TYPING SEROLOGIC ABO: CPT | Performed by: EMERGENCY MEDICINE

## 2017-02-17 PROCEDURE — 80053 COMPREHEN METABOLIC PANEL: CPT | Performed by: EMERGENCY MEDICINE

## 2017-02-17 PROCEDURE — 36415 COLL VENOUS BLD VENIPUNCTURE: CPT | Performed by: EMERGENCY MEDICINE

## 2017-02-17 PROCEDURE — 96360 HYDRATION IV INFUSION INIT: CPT

## 2017-02-17 PROCEDURE — 85025 COMPLETE CBC W/AUTO DIFF WBC: CPT | Performed by: EMERGENCY MEDICINE

## 2017-02-17 PROCEDURE — 99284 EMERGENCY DEPT VISIT MOD MDM: CPT

## 2017-02-17 RX ADMIN — IOPAMIDOL 100 ML: 755 INJECTION, SOLUTION INTRAVENOUS at 22:28

## 2017-02-17 RX ADMIN — SODIUM CHLORIDE 1000 ML: 900 INJECTION, SOLUTION INTRAVENOUS at 22:04

## 2017-02-18 LAB
ABO + RH BLD: NORMAL
BLOOD GROUP ANTIBODIES SERPL: NORMAL
SPECIMEN EXP DATE BLD: NORMAL

## 2017-02-18 NOTE — ED NOTES
Pt presents with complaint of lower abd pain with 3 bllody stools today. Pt states that she has been nauseous with no active vomiting.

## 2017-02-18 NOTE — DISCHARGE INSTRUCTIONS
Abdominal Pain: Care Instructions  Your Care Instructions    Abdominal pain has many possible causes. Some aren't serious and get better on their own in a few days. Others need more testing and treatment. If your pain continues or gets worse, you need to be rechecked and may need more tests to find out what is wrong. You may need surgery to correct the problem. Don't ignore new symptoms, such as fever, nausea and vomiting, urination problems, pain that gets worse, and dizziness. These may be signs of a more serious problem. Your doctor may have recommended a follow-up visit in the next 8 to 12 hours. If you are not getting better, you may need more tests or treatment. The doctor has checked you carefully, but problems can develop later. If you notice any problems or new symptoms, get medical treatment right away. Follow-up care is a key part of your treatment and safety. Be sure to make and go to all appointments, and call your doctor if you are having problems. It's also a good idea to know your test results and keep a list of the medicines you take. How can you care for yourself at home? · Rest until you feel better. · To prevent dehydration, drink plenty of fluids, enough so that your urine is light yellow or clear like water. Choose water and other caffeine-free clear liquids until you feel better. If you have kidney, heart, or liver disease and have to limit fluids, talk with your doctor before you increase the amount of fluids you drink. · If your stomach is upset, eat mild foods, such as rice, dry toast or crackers, bananas, and applesauce. Try eating several small meals instead of two or three large ones. · Wait until 48 hours after all symptoms have gone away before you have spicy foods, alcohol, and drinks that contain caffeine. · Do not eat foods that are high in fat. · Avoid anti-inflammatory medicines such as aspirin, ibuprofen (Advil, Motrin), and naproxen (Aleve).  These can cause stomach upset. Talk to your doctor if you take daily aspirin for another health problem. When should you call for help? Call 911 anytime you think you may need emergency care. For example, call if:  · You passed out (lost consciousness). · You pass maroon or very bloody stools. · You vomit blood or what looks like coffee grounds. · You have new, severe belly pain. Call your doctor now or seek immediate medical care if:  · Your pain gets worse, especially if it becomes focused in one area of your belly. · You have a new or higher fever. · Your stools are black and look like tar, or they have streaks of blood. · You have unexpected vaginal bleeding. · You have symptoms of a urinary tract infection. These may include:  ¨ Pain when you urinate. ¨ Urinating more often than usual.  ¨ Blood in your urine. · You are dizzy or lightheaded, or you feel like you may faint. Watch closely for changes in your health, and be sure to contact your doctor if:  · You are not getting better after 1 day (24 hours). Where can you learn more? Go to http://patricia-nan.info/. Enter P087 in the search box to learn more about \"Abdominal Pain: Care Instructions. \"  Current as of: May 27, 2016  Content Version: 11.1  © 7667-2238 [employer]. Care instructions adapted under license by [employer] (which disclaims liability or warranty for this information). If you have questions about a medical condition or this instruction, always ask your healthcare professional. [place] [address] any warranty or liability for your use of this information. Hemorrhoids: Care Instructions  Your Care Instructions    Hemorrhoids are enlarged veins that develop in the anal canal. Bleeding during bowel movements, itching, swelling, and rectal pain are the most common symptoms. They can be uncomfortable at times, but hemorrhoids rarely are a serious problem.   You can treat most hemorrhoids with simple changes to your diet and bowel habits. These changes include eating more fiber and not straining to pass stools. Most hemorrhoids do not need surgery or other treatment unless they are very large and painful or bleed a lot. Follow-up care is a key part of your treatment and safety. Be sure to make and go to all appointments, and call your doctor if you are having problems. Its also a good idea to know your test results and keep a list of the medicines you take. How can you care for yourself at home? · Sit in a few inches of warm water (sitz bath) 3 times a day and after bowel movements. The warm water helps with pain and itching. · Put ice on your anal area several times a day for 10 minutes at a time. Put a thin cloth between the ice and your skin. Follow this by placing a warm, wet towel on the area for another 10 to 20 minutes. · Take pain medicines exactly as directed. ¨ If the doctor gave you a prescription medicine for pain, take it as prescribed. ¨ If you are not taking a prescription pain medicine, ask your doctor if you can take an over-the-counter medicine. · Keep the anal area clean, but be gentle. Use water and a fragrance-free soap, such as [place], or use baby wipes or medicated pads, such as Tucks. · Wear cotton underwear and loose clothing to decrease moisture in the anal area. · Eat more fiber. Include foods such as whole-grain breads and cereals, raw vegetables, raw and dried fruits, and beans. · Drink plenty of fluids, enough so that your urine is light yellow or clear like water. If you have kidney, heart, or liver disease and have to limit fluids, talk with your doctor before you increase the amount of fluids you drink. · Use a stool softener that contains bran or psyllium. You can save money by buying bran or psyllium (available in bulk at most health food stores) and sprinkling it on foods or stirring it into fruit juice.  Or you can use a product such as Metamucil or Hydrocil. · Practice healthy bowel habits. ¨ Go to the bathroom as soon as you have the urge. ¨ Avoid straining to pass stools. Relax and give yourself time to let things happen naturally. ¨ Do not hold your breath while passing stools. ¨ Do not read while sitting on the toilet. Get off the toilet as soon as you have finished. · Take your medicines exactly as prescribed. Call your doctor if you think you are having a problem with your medicine. When should you call for help? Call 911 anytime you think you may need emergency care. For example, call if:  · You pass maroon or very bloody stools. Call your doctor now or seek immediate medical care if:  · You have increased pain. · You have increased bleeding. Watch closely for changes in your health, and be sure to contact your doctor if:  · Your symptoms have not improved after 3 or 4 days. Where can you learn more? Go to http://patricia-nan.info/. Enter F228 in the search box to learn more about \"Hemorrhoids: Care Instructions. \"  Current as of: August 9, 2016  Content Version: 11.1  © 8399-4468 [employer]. Care instructions adapted under license by [employer] (which disclaims liability or warranty for this information). If you have questions about a medical condition or this instruction, always ask your healthcare professional. [address] any warranty or liability for your use of this information. We hope that we have addressed all of your medical concerns. The examination and treatment you received in the Emergency Department were for an emergent problem and were not intended as complete care. It is important that you follow up with your healthcare provider(s) for ongoing care. If your symptoms worsen or do not improve as expected, and you are unable to reach your usual health care provider(s), you should return to the Emergency Department.       Today's healthcare is undergoing tremendous change, and patient satisfaction surveys are one of the many tools to assess the quality of medical care. You may receive a survey from the [employer] regarding your experience in the Emergency Department. I hope that your experience has been completely positive, particularly the medical care that I provided. As such, please participate in the survey; anything less than excellent does not meet my expectations or intentions. [facility] and [facility] participate in nationally recognized quality of care measures. If your blood pressure is greater than 120/80, as reported below, we urge that you seek medical care to address the potential of high blood pressure, commonly known as hypertension. Hypertension can be hereditary or can be caused by certain medical conditions, pain, stress, or \"white coat syndrome. \"       Please make an appointment with your health care provider(s) for follow up of your Emergency Department visit. VITALS:   Patient Vitals for the past 8 hrs:   Temp Pulse Resp BP SpO2   02/17/17 2313 98.6 °F (37 °C) 82 20 138/64 96 %   02/17/17 2110 - 89 20 144/78 100 %   02/17/17 1957 97.9 °F (36.6 °C) 91 20 177/72 100 %          Thank you for allowing us to provide you with medical care today. We realize that you have many choices for your emergency care needs. Please choose us in the future for any continued health care needs. Dyanna Angelucci Llewellyn Sane, [address] [place], [place] [address].   Office: [phone]            Recent Results (from the past 24 hour(s))   CBC WITH AUTOMATED DIFF    Collection Time: 02/17/17  8:23 PM   Result Value Ref Range    WBC 13.1 (H) 3.6 - 11.0 K/uL    RBC 4.67 3.80 - 5.20 M/uL    HGB 11.6 11.5 - 16.0 g/dL    HCT 37.9 35.0 - 47.0 %    MCV 81.2 80.0 - 99.0 FL    MCH 24.8 (L) 26.0 - 34.0 PG    MCHC 30.6 30.0 - 36.5 g/dL    RDW 18.0 (H) 11.5 - 14.5 % PLATELET 726 415 - 353 K/uL    NEUTROPHILS 67 32 - 75 %    LYMPHOCYTES 25 12 - 49 %    MONOCYTES 6 5 - 13 %    EOSINOPHILS 2 0 - 7 %    BASOPHILS 0 0 - 1 %    ABS. NEUTROPHILS 8.7 (H) 1.8 - 8.0 K/UL    ABS. LYMPHOCYTES 3.3 0.8 - 3.5 K/UL    ABS. MONOCYTES 0.8 0.0 - 1.0 K/UL    ABS. EOSINOPHILS 0.3 0.0 - 0.4 K/UL    ABS. BASOPHILS 0.0 0.0 - 0.1 K/UL    DF AUTOMATED     METABOLIC PANEL, COMPREHENSIVE    Collection Time: 02/17/17  8:23 PM   Result Value Ref Range    Sodium 133 (L) 136 - 145 mmol/L    Potassium 3.1 (L) 3.5 - 5.1 mmol/L    Chloride 102 97 - 108 mmol/L    CO2 29 21 - 32 mmol/L    Anion gap 2 (L) 5 - 15 mmol/L    Glucose 90 65 - 100 mg/dL    BUN 10 6 - 20 MG/DL    Creatinine 0.80 0.55 - 1.02 MG/DL    BUN/Creatinine ratio 13 12 - 20      GFR est AA >60 >60 ml/min/1.73m2    GFR est non-AA >60 >60 ml/min/1.73m2    Calcium 8.8 8.5 - 10.1 MG/DL    Bilirubin, total 0.3 0.2 - 1.0 MG/DL    ALT (SGPT) 18 12 - 78 U/L    AST (SGOT) 11 (L) 15 - 37 U/L    Alk. phosphatase 76 45 - 117 U/L    Protein, total 8.1 6.4 - 8.2 g/dL    Albumin 3.5 3.5 - 5.0 g/dL    Globulin 4.6 (H) 2.0 - 4.0 g/dL    A-G Ratio 0.8 (L) 1.1 - 2.2     TYPE & SCREEN    Collection Time: 02/17/17  8:23 PM   Result Value Ref Range    Crossmatch Expiration 02/20/2017     ABO/Rh(D) O NEGATIVE     Antibody screen NEG    POC FECAL OCCULT BLOOD    Collection Time: 02/17/17  9:05 PM   Result Value Ref Range    Occult blood, stool (POC) NEGATIVE  NEG     HCG URINE, QL. - POC    Collection Time: 02/17/17 10:06 PM   Result Value Ref Range    Pregnancy test,urine (POC) NEGATIVE  NEG         Ct Abd Pelv W Cont    Result Date: 2/17/2017  INDICATION: LLQ pain, suspect diverticulitis  , rectal bleeding today. History of cholecystectomy and tubal ligation in the past. History of diverticulitis. COMPARISON: 2/25/2015 TECHNIQUE: Following the uneventful intravenous administration of 100 cc Isovue-370, thin axial images were obtained through the abdomen and pelvis. Coronal and sagittal reconstructions were generated. Oral contrast was not administered. CT dose reduction was achieved through use of a standardized protocol tailored for this examination and automatic exposure control for dose modulation. Adaptive statistical iterative reconstruction (ASIR) was utilized. FINDINGS: LUNG BASES: Clear. INCIDENTALLY IMAGED HEART AND MEDIASTINUM: Unremarkable. LIVER: No mass or biliary dilatation. GALLBLADDER: Surgically absent. SPLEEN: Stable low-density lesion with no associated contrast enhancement. PANCREAS: No mass or ductal dilatation. ADRENALS: Unremarkable. KIDNEYS: No mass, calculus, or hydronephrosis. Low-density well-circumscribed collection lateral to the left kidney 3.2 x 2.1 cm, compared to similar measurements of 3.3 x 2.6 cm on the prior study. STOMACH: Unremarkable. SMALL BOWEL: No dilatation or wall thickening. COLON: No dilatation or wall thickening. Numerous diverticula in the sigmoid colon without definite acute associated inflammatory change. APPENDIX: Not well seen, but no acute appendiceal abnormality is suspected. PERITONEUM: No ascites or pneumoperitoneum. RETROPERITONEUM: No lymphadenopathy or aortic aneurysm. REPRODUCTIVE ORGANS: Unremarkable for age. URINARY BLADDER: No mass or calculus. BONES: No destructive bone lesion. ADDITIONAL COMMENTS: N/A     IMPRESSION: 1. Sigmoid diverticulosis without definite diverticulitis at this time. 2. Other incidental stable findings as described above.

## 2017-02-18 NOTE — ED TRIAGE NOTES
Triage note: Pt reports a couple of days ago she went to [facility] ER for back pain. Pt reports that she has had some w/ her spine and they gave her ativan, toradol and prednisone. Pt reports that she started today w/ bloody stool. Pt reports that there was blood clots in the toilet.

## 2017-02-18 NOTE — ED NOTES
The patient was discharged home by Dr. Carol Light and Lynn Barrow rn in stable condition. The patient is alert and oriented, is in no respiratory distress and has vital signs within normal limits . The patient's diagnosis, condition and treatment were explained to patient. The patient expressed understanding. No prescriptions given to pt. No work/school note given to pt. A discharge plan has been developed. A  was not involved in the process. Aftercare instructions were given to the patient. Pt's saline lock removed without complications. Pt will transport pt home.

## 2017-02-18 NOTE — ED PROVIDER NOTES
HPI Comments: Khris Fernando is a 43 yo F with blood in her stool and crampy LLQ pain. She states that she first noticed blood when she had a BM this morning. Her stool was hard formed and the blood was in the bowl and on the toilet paper. Later in the morning she noticed some LLQ pain that has come and gone. She has gone to the bathroom twice and both times noticed red blood in the toilet. Past Medical History:   Diagnosis Date    Anxiety     Asthma     Hypertension        Past Surgical History:   Procedure Laterality Date    Hx cyst removal      Hx cholecystectomy      Hx tubal ligation           Family History:   Problem Relation Age of Onset    Hypertension Mother     Hypertension Sister     Cancer Maternal Aunt        Social History     Social History    Marital status: SINGLE     Spouse name: N/A    Number of children: N/A    Years of education: N/A     Occupational History    Not on file. Social History Main Topics    Smoking status: Former Smoker    Smokeless tobacco: Never Used    Alcohol use No    Drug use: No    Sexual activity: Yes     Partners: Male     Birth control/ protection: Condom     Other Topics Concern    Not on file     Social History Narrative         ALLERGIES: Bactrim [sulfamethoprim ds]    Review of Systems   Constitutional: Negative for fever. HENT: Negative for sore throat. Eyes: Negative for visual disturbance. Respiratory: Negative for cough. Cardiovascular: Negative for chest pain. Gastrointestinal: Positive for abdominal pain and blood in stool. Genitourinary: Negative for dysuria. Musculoskeletal: Negative for back pain. Skin: Negative for rash. Neurological: Negative for headaches.        Vitals:    02/17/17 1957 02/17/17 2110 02/17/17 2313   BP: 177/72 144/78 138/64   Pulse: 91 89 82   Resp: 20 20 20   Temp: 97.9 °F (36.6 °C)  98.6 °F (37 °C)   SpO2: 100% 100% 96%   Weight: 145 kg (319 lb 10.7 oz)     Height: 5' 8\" (1.727 m) Physical Exam   Constitutional: She appears well-developed and well-nourished. No distress. HENT:   Head: Normocephalic and atraumatic. Mouth/Throat: Oropharynx is clear and moist.   Eyes: Conjunctivae and EOM are normal.   Neck: Normal range of motion and phonation normal.   Cardiovascular: Normal rate and intact distal pulses. Pulmonary/Chest: Effort normal. No respiratory distress. Abdominal: Soft. Bowel sounds are normal. She exhibits no distension. There is no tenderness. There is no rebound and no guarding. Genitourinary: Rectal exam shows external hemorrhoid (small, non bleeding, non tender). Rectal exam shows no fissure and guaiac negative stool. Musculoskeletal: Normal range of motion. She exhibits no tenderness. Neurological: She is alert. She is not disoriented. She exhibits normal muscle tone. Skin: Skin is warm and dry. Nursing note and vitals reviewed. [facility]  ED Course     10:01 PM  Patient with increased LLQ tenderness. Had large soft BM with brown, heme negative stool. No hematochezia. WBC elevated 13.1 will obtain CT abd/pelvis to eval for diverticulitis.      Procedures

## 2017-03-10 ENCOUNTER — OFFICE VISIT (OUTPATIENT)
Dept: FAMILY MEDICINE CLINIC | Age: 37
End: 2017-03-10

## 2017-03-10 VITALS
RESPIRATION RATE: 17 BRPM | OXYGEN SATURATION: 95 % | WEIGHT: 293 LBS | HEART RATE: 81 BPM | DIASTOLIC BLOOD PRESSURE: 83 MMHG | HEIGHT: 68 IN | TEMPERATURE: 99 F | BODY MASS INDEX: 44.41 KG/M2 | SYSTOLIC BLOOD PRESSURE: 120 MMHG

## 2017-03-10 DIAGNOSIS — R05.9 COUGH: ICD-10-CM

## 2017-03-10 DIAGNOSIS — J45.31 MILD PERSISTENT ASTHMA WITH ACUTE EXACERBATION: Primary | ICD-10-CM

## 2017-03-10 DIAGNOSIS — J30.9 ALLERGIC RHINITIS, UNSPECIFIED ALLERGIC RHINITIS TRIGGER, UNSPECIFIED RHINITIS SEASONALITY: ICD-10-CM

## 2017-03-10 RX ORDER — LORATADINE 10 MG/1
10 TABLET ORAL DAILY
Qty: 30 TAB | Refills: 2 | Status: SHIPPED | OUTPATIENT
Start: 2017-03-10 | End: 2017-05-09 | Stop reason: ALTCHOICE

## 2017-03-10 RX ORDER — PREDNISONE 10 MG/1
TABLET ORAL
Qty: 1 PACKAGE | Refills: 0 | Status: SHIPPED | OUTPATIENT
Start: 2017-03-10 | End: 2017-03-21

## 2017-03-10 RX ORDER — PROMETHAZINE HYDROCHLORIDE 25 MG/1
TABLET ORAL
Refills: 0 | COMMUNITY
Start: 2017-01-05 | End: 2017-05-09 | Stop reason: ALTCHOICE

## 2017-03-10 RX ORDER — CODEINE PHOSPHATE AND GUAIFENESIN 10; 100 MG/5ML; MG/5ML
5 SOLUTION ORAL
Qty: 120 ML | Refills: 0 | Status: SHIPPED | OUTPATIENT
Start: 2017-03-10 | End: 2017-03-21

## 2017-03-10 NOTE — LETTER
3/10/2017 2:04 PM 
 
Ms. Cruz Gonzales [phone] Please excuse Ms. Matilde Campos from work today due to illness. Sincerely, Yeison Maria, NP

## 2017-03-10 NOTE — MR AVS SNAPSHOT
Visit Information Date & Time Provider Department Dept. Phone Encounter #  
 3/10/2017  1:30 PM Rommel Nicole NP [address] [facility] [phone] [id number] Follow-up Instructions Return if symptoms worsen or fail to improve. Upcoming Health Maintenance Date Due Pneumococcal 19-64 Medium Risk (1 of 1 - PPSV23) 1/13/1998 DTaP/Tdap/Td series (1 - Tdap) 1/13/2000 PAP AKA CERVICAL CYTOLOGY 1/13/2000 Allergies as of 3/10/2017  Review Complete On: 3/10/2017 By: Andrés Merrill LPN Severity Noted Reaction Type Reactions Bactrim [Sulfamethoprim Ds]  12/16/2013    Hives Current Immunizations  Reviewed on 1/21/2016 No immunizations on file. Not reviewed this visit You Were Diagnosed With   
  
 Codes Comments Allergic rhinitis, unspecified allergic rhinitis trigger, unspecified rhinitis seasonality    -  Primary ICD-10-CM: J30.9 ICD-9-CM: 477.9 Mild persistent asthma with acute exacerbation     ICD-10-CM: J45.31 
ICD-9-CM: 493.92 Cough     ICD-10-CM: R05 ICD-9-CM: 608. 2 Vitals BP Pulse Temp Resp Height(growth percentile) Weight(growth percentile) 120/83 (BP 1 Location: Left arm, BP Patient Position: Sitting) 81 99 °F (37.2 °C) (Oral) 17 5' 8\" (1.727 m) 322 lb (146.1 kg) LMP SpO2 BMI OB Status Smoking Status 01/20/2017 95% 48.96 kg/m2 Having regular periods Former Smoker Vitals History BMI and BSA Data Body Mass Index Body Surface Area 48.96 kg/m 2 2.65 m 2 Preferred Pharmacy Pharmacy Name Phone [id number], [address] [facility] [phone] Your Updated Medication List  
  
   
This list is accurate as of: 3/10/17  2:04 PM.  Always use your most recent med list.  
  
  
  
  
 albuterol 90 mcg/actuation inhaler Commonly known as:  VENTOLIN HFA Take 1 Puff by inhalation every four (4) hours as needed for Wheezing. amLODIPine 5 mg tablet Commonly known as:  Lin Cater Take 1 Tab by mouth daily. beclomethasone 80 mcg/actuation inhaler Commonly known as:  QVAR Take 2 Puffs by inhalation two (2) times a day. clonazePAM 1 mg tablet Commonly known as:  Griselda Kava Take 1 Tab by mouth daily as needed. - must last 30 days  
  
 guaiFENesin-codeine 100-10 mg/5 mL solution Commonly known as:  Lorena Pleva Take 5 mL by mouth three (3) times daily as needed for Cough. Max Daily Amount: 15 mL. loratadine 10 mg tablet Commonly known as:  Arben Sor Take 1 Tab by mouth daily. metFORMIN  mg tablet Commonly known as:  GLUCOPHAGE XR Take 1 Tab by mouth daily (with dinner). predniSONE 10 mg dose pack Commonly known as:  STERAPRED DS Use as directed on packaging x 6 days  
  
 promethazine 25 mg tablet Commonly known as:  PHENERGAN  
TAKE 1 TAB BY MOUTH EVERY EIGHT (8) HOURS AS NEEDED FOR NAUSEA. Prescriptions Printed Refills  
 guaiFENesin-codeine (CHERATUSSIN AC) 100-10 mg/5 mL solution 0 Sig: Take 5 mL by mouth three (3) times daily as needed for Cough. Max Daily Amount: 15 mL. Class: Print Route: Oral  
  
Prescriptions Sent to Pharmacy Refills  
 loratadine (CLARITIN) 10 mg tablet 2 Sig: Take 1 Tab by mouth daily. Class: Normal  
 Pharmacy: [address] [place] [facility] Ph #: [phone] Route: Oral  
 beclomethasone (QVAR) 80 mcg/actuation inhaler 3 Sig: Take 2 Puffs by inhalation two (2) times a day. Class: Normal  
 [facility] Ph #: [phone] Route: Inhalation  
 predniSONE (STERAPRED DS) 10 mg dose pack 0 Sig: Use as directed on packaging x 6 days Class: Normal  
 [facility] Ph #: [phone] Follow-up Instructions Return if symptoms worsen or fail to improve. Patient Instructions Allergies: Care Instructions Your Care Instructions Allergies occur when your body's defense system (immune system) overreacts to certain substances. The immune system treats a harmless substance as if it were a harmful germ or virus. Many things can cause this overreaction, including pollens, medicine, food, dust, animal dander, and mold. Allergies can be mild or severe. Mild allergies can be managed with home treatment. But medicine may be needed to prevent problems. Managing your allergies is an important part of staying healthy. Your doctor may suggest that you have allergy testing to help find out what is causing your allergies. When you know what things trigger your symptoms, you can avoid them. This can prevent allergy symptoms and other health problems. For severe allergies that cause reactions that affect your whole body (anaphylactic reactions), your doctor may prescribe a shot of epinephrine to carry with you in case you have a severe reaction. Learn how to give yourself the shot and keep it with you at all times. Make sure it is not . Follow-up care is a key part of your treatment and safety. Be sure to make and go to all appointments, and call your doctor if you are having problems. It's also a good idea to know your test results and keep a list of the medicines you take. How can you care for yourself at home? · If you have been told by your doctor that dust or dust mites are causing your allergy, decrease the dust around your bed: 
[employer] sheets, pillowcases, and other bedding in hot water every week. ¨ Use dust-proof covers for pillows, duvets, and mattresses. Avoid plastic covers because they tear easily and do not \"breathe. \" Wash as instructed on the label. ¨ Do not use any blankets and pillows that you do not need. ¨ Use blankets that you can wash in your washing machine.  
¨ Consider removing drapes and carpets, which attract and hold dust, from your bedroom. · If you are allergic to house dust and mites, do not use home humidifiers. Your doctor can suggest ways you can control dust and mites. · Look for signs of cockroaches. Cockroaches cause allergic reactions. Use cockroach baits to get rid of them. Then, clean your home well. Cockroaches like areas where grocery bags, newspapers, empty bottles, or cardboard boxes are stored. Do not keep these inside your home, and keep trash and food containers sealed. Seal off any spots where cockroaches might enter your home. · If you are allergic to mold, get rid of furniture, rugs, and drapes that smell musty. Check for mold in the bathroom. · If you are allergic to outdoor pollen or mold spores, use air-conditioning. Change or clean all filters every month. Keep windows closed. · If you are allergic to pollen, stay inside when pollen counts are high. Use a vacuum  with a HEPA filter or a double-thickness filter at least two times each week. · Stay inside when air pollution is bad. Avoid paint fumes, perfumes, and other strong odors. · Avoid conditions that make your allergies worse. Stay away from smoke. Do not smoke or let anyone else smoke in your house. Do not use fireplaces or wood-burning stoves. · If you are allergic to your pets, change the air filter in your furnace every month. Use high-efficiency filters. · If you are allergic to pet dander, keep pets outside or out of your bedroom. Old carpet and cloth furniture can hold a lot of animal dander. You may need to replace them. When should you call for help? Give an epinephrine shot if: 
· You think you are having a severe allergic reaction. · You have symptoms in more than one body area, such as mild nausea and an itchy mouth. After giving an epinephrine shot call 911, even if you feel better. Call 911 if: 
· You have symptoms of a severe allergic reaction. These may include: 
¨ Sudden raised, red areas (hives) all over your body. ¨ Swelling of the throat, mouth, lips, or tongue. ¨ Trouble breathing. ¨ Passing out (losing consciousness). Or you may feel very lightheaded or suddenly feel weak, confused, or restless. · You have been given an epinephrine shot, even if you feel better. Call your doctor now or seek immediate medical care if: 
· You have symptoms of an allergic reaction, such as: ¨ A rash or hives (raised, red areas on the skin). ¨ Itching. ¨ Swelling. ¨ Belly pain, nausea, or vomiting. Watch closely for changes in your health, and be sure to contact your doctor if: 
· You do not get better as expected. Where can you learn more? Go to http://patricia-nan.info/. Enter T210 in the search box to learn more about \"Allergies: Care Instructions. \" Current as of: February 12, 2016 Content Version: 11.1 © 6840-9454 [employer]. Care instructions adapted under license by [employer] (which disclaims liability or warranty for this information). If you have questions about a medical condition or this instruction, always ask your healthcare professional. [place] [address] any warranty or liability for your use of this information. Introducing [facility]! Kat Gallego introduces [employer] patient portal. Now you can access parts of your medical record, email your doctor's office, and request medication refills online. 1. In your internet browser, go to [email or web address] 2. Click on the First Time User? Click Here link in the Sign In box. You will see the New Member Sign Up page. 3. Enter your [employer] Access Code exactly as it appears below. You will not need to use this code after youve completed the sign-up process. If you do not sign up before the expiration date, you must request a new code. · [employer] Access Code: [email or web address] Expires: 4/5/2017 12:06 PM 
 
 4. Enter the last four digits of your Social Security Number (xxxx) and Date of Birth (mm/dd/yyyy) as indicated and click Submit. You will be taken to the next sign-up page. 5. Create a [employer] ID. This will be your [employer] login ID and cannot be changed, so think of one that is secure and easy to remember. 6. Create a [employer] password. You can change your password at any time. 7. Enter your Password Reset Question and Answer. This can be used at a later time if you forget your password. 8. Enter your e-mail address. You will receive e-mail notification when new information is available in [address] 9. Click Sign Up. You can now view and download portions of your medical record. 10. Click the Download Summary menu link to download a portable copy of your medical information. If you have questions, please visit the Frequently Asked Questions section of the [employer] website. Remember, [employer] is NOT to be used for urgent needs. For medical emergencies, dial 911. Now available from your iPhone and Android! Please provide this summary of care documentation to your next provider. Your primary care clinician is listed as Bradley Juarez. If you have any questions after today's visit, please call [phone].

## 2017-03-10 NOTE — PATIENT INSTRUCTIONS

## 2017-03-10 NOTE — PROGRESS NOTES
Chief Complaint   Patient presents with    Allergies     (Rm #3) productive cough, rib pain from coughing; pt feels coughing due to allergies     1. Have you been to the ER, urgent care clinic since your last visit? Hospitalized since your last visit? No    2. Have you seen or consulted any other health care providers outside of the [address] [place] [address] since your last visit? Include any pap smears or colon screening.  No

## 2017-03-10 NOTE — PROGRESS NOTES
Chief Complaint   Patient presents with    Allergies     (Rm #3) productive cough, rib pain from coughing; pt feels coughing due to allergies     she is a 44y.o. year old female who presents for evalution. Pt states has been having bad allergies. Also having bad cough, intermittently productive, makes pt ribs hurt. Sometimes will vomit mucus from coughing so hard. No fever or chills. Only tried Benadryl at home, no other OTC. Has been using rescue nhaler multiple times a day x weeks. Reviewed PmHx, RxHx, FmHx, SocHx, AllgHx and updated and dated in the chart. Review of Systems - negative except as listed above in the HPI    Objective:     Vitals:    03/10/17 1351   BP: 120/83   Pulse: 81   Resp: 17   Temp: 99 °F (37.2 °C)   TempSrc: Oral   SpO2: 95%   Weight: 322 lb (146.1 kg)   Height: 5' 8\" (1.727 m)     Physical Examination: General appearance - alert, well appearing, and in no distress  Ears - bilateral TM's and external ear canals normal  Nose - normal nontender sinuses, mucosal congestion, mucosal pallor and clear rhinorrhea  Mouth - mucous membranes moist, pharynx normal without lesions and erythematous  Neck - supple, no significant adenopathy  Chest - clear to auscultation, no wheezes, rales or rhonchi, symmetric air entry, coarse breath sounds   Heart - normal rate, regular rhythm, normal S1, S2, no murmurs, rubs, clicks or gallops    Assessment/ Plan:   Vero was seen today for allergies. Diagnoses and all orders for this visit:    Mild persistent asthma with acute exacerbation  -     beclomethasone (QVAR) 80 mcg/actuation inhaler; Take 2 Puffs by inhalation two (2) times a day. -     predniSONE (STERAPRED DS) 10 mg dose pack; Use as directed on packaging x 6 days  New rxs. Discussed and encouraged rest and clear fluids, if congestion is thick should avoid dairy. Recommended hot showers with steam and elevating head of bed.   May use Vitamin C or Echinacea in addition to current therapy. Allergic rhinitis, unspecified allergic rhinitis trigger, unspecified rhinitis seasonality  -     loratadine (CLARITIN) 10 mg tablet; Take 1 Tab by mouth daily. New rx. Allergy avoidance. F/U prn    Cough  -     guaiFENesin-codeine (CHERATUSSIN AC) 100-10 mg/5 mL solution; Take 5 mL by mouth three (3) times daily as needed for Cough. Max Daily Amount: 15 mL. New rx. Discussed and encouraged rest and clear fluids, if congestion is thick should avoid dairy. Recommended hot showers with steam and elevating head of bed. May use Vitamin C or Echinacea in addition to current therapy. Pt voiced understanding regarding plan of care. Follow-up Disposition:  Return if symptoms worsen or fail to improve. I have discussed the diagnosis with the patient and the intended plan as seen in the above orders. The patient has received an after-visit summary and questions were answered concerning future plans.      Medication Side Effects and Warnings were discussed with patient    Juliocesar Gillespie NP

## 2017-03-21 ENCOUNTER — APPOINTMENT (OUTPATIENT)
Dept: GENERAL RADIOLOGY | Age: 37
End: 2017-03-21
Attending: PHYSICIAN ASSISTANT
Payer: MEDICAID

## 2017-03-21 ENCOUNTER — HOSPITAL ENCOUNTER (EMERGENCY)
Age: 37
Discharge: HOME | End: 2017-03-21
Attending: EMERGENCY MEDICINE
Payer: MEDICAID

## 2017-03-21 VITALS
TEMPERATURE: 98.5 F | WEIGHT: 290 LBS | OXYGEN SATURATION: 96 % | SYSTOLIC BLOOD PRESSURE: 134 MMHG | HEIGHT: 68 IN | BODY MASS INDEX: 43.95 KG/M2 | DIASTOLIC BLOOD PRESSURE: 76 MMHG | HEART RATE: 82 BPM | RESPIRATION RATE: 18 BRPM

## 2017-03-21 DIAGNOSIS — R07.89 MUSCULOSKELETAL CHEST PAIN: ICD-10-CM

## 2017-03-21 DIAGNOSIS — R68.89 FLU-LIKE SYMPTOMS: Primary | ICD-10-CM

## 2017-03-21 LAB
APPEARANCE UR: ABNORMAL
BACTERIA URNS QL MICRO: NEGATIVE /HPF
BILIRUB UR QL: NEGATIVE
COLOR UR: ABNORMAL
EPITH CASTS URNS QL MICRO: ABNORMAL /LPF
FLUAV AG NPH QL IA: NEGATIVE
FLUBV AG NOSE QL IA: NEGATIVE
GLUCOSE UR STRIP.AUTO-MCNC: NEGATIVE MG/DL
HCG UR QL: NEGATIVE
HGB UR QL STRIP: NEGATIVE
HYALINE CASTS URNS QL MICRO: ABNORMAL /LPF (ref 0–5)
KETONES UR QL STRIP.AUTO: NEGATIVE MG/DL
LEUKOCYTE ESTERASE UR QL STRIP.AUTO: NEGATIVE
NITRITE UR QL STRIP.AUTO: NEGATIVE
PH UR STRIP: 5 [PH] (ref 5–8)
PROT UR STRIP-MCNC: ABNORMAL MG/DL
RBC #/AREA URNS HPF: ABNORMAL /HPF (ref 0–5)
SP GR UR REFRACTOMETRY: 1.03 (ref 1–1.03)
UA: UC IF INDICATED,UAUC: ABNORMAL
UROBILINOGEN UR QL STRIP.AUTO: 0.2 EU/DL (ref 0.2–1)
WBC URNS QL MICRO: ABNORMAL /HPF (ref 0–4)

## 2017-03-21 PROCEDURE — 87804 INFLUENZA ASSAY W/OPTIC: CPT | Performed by: PHYSICIAN ASSISTANT

## 2017-03-21 PROCEDURE — 74011250637 HC RX REV CODE- 250/637: Performed by: PHYSICIAN ASSISTANT

## 2017-03-21 PROCEDURE — 81025 URINE PREGNANCY TEST: CPT

## 2017-03-21 PROCEDURE — 81001 URINALYSIS AUTO W/SCOPE: CPT | Performed by: EMERGENCY MEDICINE

## 2017-03-21 PROCEDURE — 71020 XR CHEST PA LAT: CPT

## 2017-03-21 PROCEDURE — 99283 EMERGENCY DEPT VISIT LOW MDM: CPT

## 2017-03-21 RX ORDER — ALBUTEROL SULFATE 90 UG/1
2 AEROSOL, METERED RESPIRATORY (INHALATION)
Qty: 1 INHALER | Refills: 1 | Status: SHIPPED | OUTPATIENT
Start: 2017-03-21 | End: 2017-06-19 | Stop reason: SDUPTHER

## 2017-03-21 RX ORDER — ONDANSETRON 4 MG/1
4 TABLET, ORALLY DISINTEGRATING ORAL
Qty: 10 TAB | Refills: 0 | Status: SHIPPED | OUTPATIENT
Start: 2017-03-21 | End: 2017-05-09 | Stop reason: ALTCHOICE

## 2017-03-21 RX ORDER — IBUPROFEN 800 MG/1
800 TABLET ORAL
Status: COMPLETED | OUTPATIENT
Start: 2017-03-21 | End: 2017-03-21

## 2017-03-21 RX ORDER — TRAMADOL HYDROCHLORIDE 50 MG/1
50 TABLET ORAL
Qty: 10 TAB | Refills: 0 | Status: SHIPPED | OUTPATIENT
Start: 2017-03-21 | End: 2017-03-31 | Stop reason: ALTCHOICE

## 2017-03-21 RX ADMIN — IBUPROFEN 800 MG: 800 TABLET, FILM COATED ORAL at 16:58

## 2017-03-21 NOTE — ED TRIAGE NOTES
Pt c/o cough x 3 days. Today at work noted onset of sore throat and cough, could not stop, then started vomiting and could not stop vomiting. Pt states she got dizzy. Also stated there is a knot in her left lower ribs/LUQ. No fevers.

## 2017-03-21 NOTE — ED PROVIDER NOTES
HPI Comments: 44 y.o. female with past medical history significant for asthma, HTN, anxiety, S/P tubal ligation, and S/P cholecystectomy who presents to the ED with chief complaint of cough. Pt reports cough onset today accompanied by sore throat, congestion, nausea, two episodes of vomiting, generalized body aches, headache, and chest tightness with coughing. Pt also reports a \"knot\" to her left lower chest when she coughs with associated pain. Pt states she is unsure if she has had a fever. Pt denies any treatment prior to arrival. Pt denies getting a flu shot this year. Pt states her LMP was a couple weeks ago and was normal. Pt denies SOB, diarrhea, or urinary sx. There are no other acute medical complaints voiced at this time. Social Hx: Denies smoking tobacco or use of EtOH. PCP: Ira Smith DO    Note written by Jarrod Carrasco, as dictated by BETY Crawford 4:45 PM       The history is provided by the patient. Past Medical History:   Diagnosis Date    Anxiety     Asthma     Hypertension        Past Surgical History:   Procedure Laterality Date    HX CHOLECYSTECTOMY      HX CYST REMOVAL      HX TUBAL LIGATION           Family History:   Problem Relation Age of Onset    Hypertension Mother     Hypertension Sister     Cancer Maternal Aunt        Social History     Social History    Marital status: SINGLE     Spouse name: N/A    Number of children: N/A    Years of education: N/A     Occupational History    Not on file. Social History Main Topics    Smoking status: Former Smoker    Smokeless tobacco: Never Used    Alcohol use No    Drug use: No    Sexual activity: Yes     Partners: Male     Birth control/ protection: Condom     Other Topics Concern    Not on file     Social History Narrative         ALLERGIES: Bactrim [sulfamethoprim ds]    Review of Systems   Constitutional: Negative for appetite change, chills, fatigue and fever.    HENT: Positive for congestion and sore throat. Negative for ear pain, postnasal drip, rhinorrhea and sneezing. Eyes: Negative for redness and visual disturbance. Respiratory: Positive for cough and chest tightness (secondary to coughing). Negative for shortness of breath and wheezing. Cardiovascular: Negative for chest pain, palpitations and leg swelling. Gastrointestinal: Positive for nausea and vomiting. Negative for abdominal pain, anal bleeding, constipation and diarrhea. Genitourinary: Negative for decreased urine volume, difficulty urinating, dysuria, frequency, hematuria and urgency. Musculoskeletal: Positive for myalgias. Negative for back pain and neck stiffness. +\"knot\" to left lower chest when coughs with associated pain   Skin: Negative for rash. Allergic/Immunologic: Negative for immunocompromised state. Neurological: Positive for headaches. Negative for dizziness, syncope, weakness and light-headedness. Hematological: Negative for adenopathy. Vitals:    03/21/17 1622   BP: 134/76   Pulse: 82   Resp: 18   Temp: 98.5 °F (36.9 °C)   SpO2: 96%   Weight: 131.5 kg (290 lb)   Height: 5' 8\" (1.727 m)            Physical Exam   Constitutional: She is oriented to person, place, and time. She appears well-developed and well-nourished. No distress. Above average bmi   HENT:   Head: Normocephalic and atraumatic. Right Ear: External ear normal.   Left Ear: External ear normal.   Nose: Nose normal.   Mouth/Throat: Oropharynx is clear and moist. No oropharyngeal exudate. Erythematous boggy nares  + PND   Neck: Neck supple. No JVD present. No tracheal deviation present. Cardiovascular: Normal rate, regular rhythm, normal heart sounds and intact distal pulses. Exam reveals no gallop and no friction rub. No murmur heard. Pulmonary/Chest: Effort normal and breath sounds normal. No stridor. No respiratory distress. She has no wheezes. She has no rales. She exhibits tenderness.    Left lower anterior chest focally TTP without swelling, during cough. + spam. No step off. No discoloration. No deformity or lesions. No subcutaneous air. No tracheal deviation. No changes in respiratory excursion     Abdominal: Soft. Bowel sounds are normal. She exhibits no distension and no mass. There is no tenderness. There is no rebound and no guarding. Musculoskeletal: Normal range of motion. She exhibits no edema, tenderness or deformity. Lymphadenopathy:     She has no cervical adenopathy. Neurological: She is alert and oriented to person, place, and time. No cranial nerve deficit. Coordination normal.   Skin: No rash noted. No erythema. No pallor. Psychiatric: She has a normal mood and affect. Her behavior is normal.   Nursing note and vitals reviewed. MDM  Number of Diagnoses or Management Options  Flu-like symptoms:   Musculoskeletal chest pain:      Amount and/or Complexity of Data Reviewed  Clinical lab tests: ordered and reviewed  Tests in the radiology section of CPT®: ordered and reviewed  Tests in the medicine section of CPT®: ordered and reviewed  Obtain history from someone other than the patient: yes  Review and summarize past medical records: yes  Independent visualization of images, tracings, or specimens: yes    Patient Progress  Patient progress: stable    ED Course       Procedures    5:05 PM  Discussed pt, sx, hx and current findings with Dr Jennifer Shelley. He is in agreement with lina Prado.  ANJELICA Gongora      LABORATORY TESTS:  Recent Results (from the past 12 hour(s))   URINALYSIS W/ REFLEX CULTURE    Collection Time: 03/21/17  4:57 PM   Result Value Ref Range    Color YELLOW/STRAW      Appearance CLOUDY (A) CLEAR      Specific gravity 1.027 1.003 - 1.030      pH (UA) 5.0 5.0 - 8.0      Protein TRACE (A) NEG mg/dL    Glucose NEGATIVE  NEG mg/dL    Ketone NEGATIVE  NEG mg/dL    Bilirubin NEGATIVE  NEG      Blood NEGATIVE  NEG      Urobilinogen 0.2 0.2 - 1.0 EU/dL    Nitrites NEGATIVE  NEG Leukocyte Esterase NEGATIVE  NEG      WBC 0-4 0 - 4 /hpf    RBC 5-10 0 - 5 /hpf    Epithelial cells MODERATE (A) FEW /lpf    Bacteria NEGATIVE  NEG /hpf    UA:UC IF INDICATED CULTURE NOT INDICATED BY UA RESULT CNI      Hyaline cast 5-10 0 - 5 /lpf   INFLUENZA A & B AG (RAPID TEST)    Collection Time: 03/21/17  4:57 PM   Result Value Ref Range    Influenza A Antigen NEGATIVE  NEG      Influenza B Antigen NEGATIVE  NEG     HCG URINE, QL. - POC    Collection Time: 03/21/17  5:42 PM   Result Value Ref Range    Pregnancy test,urine (POC) NEGATIVE  NEG         IMAGING RESULTS:  XR CHEST PA LAT    (Results Pending)   INDICATION: . cough, congestion  COMPARISON: Previous chest xray, 9/5/2015 and 5/16/2015. Naye Francois FINDINGS: PA and lateral view of the chest.   .  Lines/tubes/surgical: None. Heart/mediastinum: Unremarkable. Lungs/pleura: No focal consolidation or mass. No visualized pleural effusion or  pneumothorax. Additional Comments: Degenerative changes in the spine. Surgical clips in the  upper abdomen. Naye Francois IMPRESSION  IMPRESSION:  1. No radiographic evidence of acute cardiopulmonary disease. MEDICATIONS GIVEN:  Medications   ibuprofen (MOTRIN) tablet 800 mg (800 mg Oral Given 3/21/17 2987)       IMPRESSION:  1. Flu-like symptoms    2. Musculoskeletal chest pain        PLAN:  1. Current Discharge Medication List      START taking these medications    Details   guaiFENesin-dextromethorphan SR (MUCINEX DM) 600-30 mg per tablet Take 1 Tab by mouth two (2) times a day. Qty: 20 Tab, Refills: 0      ondansetron (ZOFRAN ODT) 4 mg disintegrating tablet Take 1 Tab by mouth every eight (8) hours as needed for Nausea. Qty: 10 Tab, Refills: 0      !! albuterol (PROVENTIL HFA, VENTOLIN HFA, PROAIR HFA) 90 mcg/actuation inhaler Take 2 Puffs by inhalation every four (4) hours as needed for Wheezing.   Qty: 1 Inhaler, Refills: 1      traMADol (ULTRAM) 50 mg tablet Take 1 Tab by mouth every six (6) hours as needed for Pain for up to 10 days. Max Daily Amount: 200 mg. Qty: 10 Tab, Refills: 0       !! - Potential duplicate medications found. Please discuss with provider. CONTINUE these medications which have NOT CHANGED    Details   loratadine (CLARITIN) 10 mg tablet Take 1 Tab by mouth daily. Qty: 30 Tab, Refills: 2    Associated Diagnoses: Allergic rhinitis, unspecified allergic rhinitis trigger, unspecified rhinitis seasonality      beclomethasone (QVAR) 80 mcg/actuation inhaler Take 2 Puffs by inhalation two (2) times a day. Qty: 8.7 g, Refills: 3    Associated Diagnoses: Mild persistent asthma with acute exacerbation      metFORMIN ER (GLUCOPHAGE XR) 500 mg tablet Take 1 Tab by mouth daily (with dinner). Qty: 90 Tab, Refills: 1    Associated Diagnoses: Prediabetes      !! albuterol (VENTOLIN HFA) 90 mcg/actuation inhaler Take 1 Puff by inhalation every four (4) hours as needed for Wheezing. Qty: 1 Inhaler, Refills: 2      amLODIPine (NORVASC) 5 mg tablet Take 1 Tab by mouth daily. Qty: 30 Tab, Refills: 5      promethazine (PHENERGAN) 25 mg tablet TAKE 1 TAB BY MOUTH EVERY EIGHT (8) HOURS AS NEEDED FOR NAUSEA. Refills: 0       !! - Potential duplicate medications found. Please discuss with provider. 2.   Follow-up Information     Follow up With Details Comments [address], DO Schedule an appointment as soon as possible for a visit 2-4 days for [facility]heck [facility] [address] [phone]          Return to ED if worse     6:06 PM  Pt has been reexamined. Pt has no new complaints, changes or physical findings. Care plan outlined and precautions discussed. All available results were reviewed with pt. All medications were reviewed with pt. All of pt's questions and concerns were addressed. Pt agrees to F/U as instructed and agrees to return to ED upon further deterioration. Pt is ready to go home.   Flora Goldsmith PA-C      LABORATORY TESTS:  Recent Results (from the past 12 hour(s))   URINALYSIS W/ REFLEX CULTURE    Collection Time: 03/21/17  4:57 PM   Result Value Ref Range    Color YELLOW/STRAW      Appearance CLOUDY (A) CLEAR      Specific gravity 1.027 1.003 - 1.030      pH (UA) 5.0 5.0 - 8.0      Protein TRACE (A) NEG mg/dL    Glucose NEGATIVE  NEG mg/dL    Ketone NEGATIVE  NEG mg/dL    Bilirubin NEGATIVE  NEG      Blood NEGATIVE  NEG      Urobilinogen 0.2 0.2 - 1.0 EU/dL    Nitrites NEGATIVE  NEG      Leukocyte Esterase NEGATIVE  NEG      WBC 0-4 0 - 4 /hpf    RBC 5-10 0 - 5 /hpf    Epithelial cells MODERATE (A) FEW /lpf    Bacteria NEGATIVE  NEG /hpf    UA:UC IF INDICATED CULTURE NOT INDICATED BY UA RESULT CNI      Hyaline cast 5-10 0 - 5 /lpf   INFLUENZA A & B AG (RAPID TEST)    Collection Time: 03/21/17  4:57 PM   Result Value Ref Range    Influenza A Antigen NEGATIVE  NEG      Influenza B Antigen NEGATIVE  NEG     HCG URINE, QL. - POC    Collection Time: 03/21/17  5:42 PM   Result Value Ref Range    Pregnancy test,urine (POC) NEGATIVE  NEG         IMAGING RESULTS:  XR CHEST PA LAT    (Results Pending)       MEDICATIONS GIVEN:  Medications   ibuprofen (MOTRIN) tablet 800 mg (800 mg Oral Given 3/21/17 1658)       IMPRESSION:  1. Flu-like symptoms    2. Musculoskeletal chest pain        PLAN:  1. Current Discharge Medication List      START taking these medications    Details   guaiFENesin-dextromethorphan SR (MUCINEX DM) 600-30 mg per tablet Take 1 Tab by mouth two (2) times a day. Qty: 20 Tab, Refills: 0      ondansetron (ZOFRAN ODT) 4 mg disintegrating tablet Take 1 Tab by mouth every eight (8) hours as needed for Nausea. Qty: 10 Tab, Refills: 0      !! albuterol (PROVENTIL HFA, VENTOLIN HFA, PROAIR HFA) 90 mcg/actuation inhaler Take 2 Puffs by inhalation every four (4) hours as needed for Wheezing. Qty: 1 Inhaler, Refills: 1      traMADol (ULTRAM) 50 mg tablet Take 1 Tab by mouth every six (6) hours as needed for Pain for up to 10 days.  Max Daily Amount: 200 mg.  Qty: 10 Tab, Refills: 0       !! - Potential duplicate medications found. Please discuss with provider. CONTINUE these medications which have NOT CHANGED    Details   loratadine (CLARITIN) 10 mg tablet Take 1 Tab by mouth daily. Qty: 30 Tab, Refills: 2    Associated Diagnoses: Allergic rhinitis, unspecified allergic rhinitis trigger, unspecified rhinitis seasonality      beclomethasone (QVAR) 80 mcg/actuation inhaler Take 2 Puffs by inhalation two (2) times a day. Qty: 8.7 g, Refills: 3    Associated Diagnoses: Mild persistent asthma with acute exacerbation      metFORMIN ER (GLUCOPHAGE XR) 500 mg tablet Take 1 Tab by mouth daily (with dinner). Qty: 90 Tab, Refills: 1    Associated Diagnoses: Prediabetes      !! albuterol (VENTOLIN HFA) 90 mcg/actuation inhaler Take 1 Puff by inhalation every four (4) hours as needed for Wheezing. Qty: 1 Inhaler, Refills: 2      amLODIPine (NORVASC) 5 mg tablet Take 1 Tab by mouth daily. Qty: 30 Tab, Refills: 5      promethazine (PHENERGAN) 25 mg tablet TAKE 1 TAB BY MOUTH EVERY EIGHT (8) HOURS AS NEEDED FOR NAUSEA. Refills: 0       !! - Potential duplicate medications found. Please discuss with provider. 2.   Follow-up Information     Follow up With Details Comments [address], DO Schedule an appointment as soon as possible for a visit 2-4 days for recheck [address] [place] [address]  [phone]          Return to ED if worse       6:10 PM  Pt has been reexamined. Pt has no new complaints, changes or physical findings. Care plan outlined and precautions discussed. All available results were reviewed with pt. All medications were reviewed with pt. All of pt's questions and concerns were addressed. Pt agrees to F/U as instructed and agrees to return to ED upon further deterioration. Pt is ready to go home.   Samra Shipley PA-C

## 2017-03-21 NOTE — Clinical Note
Rest, push clear liquids, salt water nose sprays, salt water gargles. Motrin and tylenol for pain and fever.

## 2017-03-21 NOTE — DISCHARGE INSTRUCTIONS
Musculoskeletal Chest Pain: Care Instructions  Your Care Instructions  Chest pain is not always a sign that something is wrong with your heart or that you have another serious problem. The doctor thinks your chest pain is caused by strained muscles or ligaments, inflamed chest cartilage, or another problem in your chest, rather than by your heart. You may need more tests to find the cause of your chest pain. Follow-up care is a key part of your treatment and safety. Be sure to make and go to all appointments, and call your doctor if you are having problems. Its also a good idea to know your test results and keep a list of the medicines you take. How can you care for yourself at home? · Take pain medicines exactly as directed. ¨ If the doctor gave you a prescription medicine for pain, take it as prescribed. ¨ If you are not taking a prescription pain medicine, ask your doctor if you can take an over-the-counter medicine. · Rest and protect the sore area. · Stop, change, or take a break from any activity that may be causing your pain or soreness. · Put ice or a cold pack on the sore area for 10 to 20 minutes at a time. Try to do this every 1 to 2 hours for the next 3 days (when you are awake) or until the swelling goes down. Put a thin cloth between the ice and your skin. · After 2 or 3 days, apply a heating pad set on low or a warm cloth to the area that hurts. Some doctors suggest that you go back and forth between hot and cold. · Do not wrap or tape your ribs for support. This may cause you to take smaller breaths, which could increase your risk of lung problems. · Mentholated creams such as Bengay or Icy Hot may soothe sore muscles. Follow the instructions on the package. · Follow your doctor's instructions for exercising. · Gentle stretching and massage may help you get better faster. Stretch slowly to the point just before pain begins, and hold the stretch for at least 15 to 30 seconds.  Do this 3 or 4 times a day. Stretch just after you have applied heat. · As your pain gets better, slowly return to your normal activities. Any increased pain may be a sign that you need to rest a while longer. When should you call for help? Call 911 anytime you think you may need emergency care. For example, call if:  · You have chest pain or pressure. This may occur with:  ¨ Sweating. ¨ Shortness of breath. ¨ Nausea or vomiting. ¨ Pain that spreads from the chest to the neck, jaw, or one or both shoulders or arms. ¨ Dizziness or lightheadedness. ¨ A fast or uneven pulse. After calling 911, chew 1 adult-strength aspirin. Wait for an ambulance. Do not try to drive yourself. · You have sudden chest pain and shortness of breath, or you cough up blood. Call your doctor now or seek immediate medical care if:  · You have any trouble breathing. · Your chest pain gets worse. · Your chest pain occurs consistently with exercise and is relieved by rest.  Watch closely for changes in your health, and be sure to contact your doctor if:  · Your chest pain does not get better after 1 week. Where can you learn more? Go to http://patricia-nan.info/. Enter V293 in the search box to learn more about \"Musculoskeletal Chest Pain: Care Instructions. \"  Current as of: May 27, 2016  Content Version: 11.1  © 5885-6102 [employer]. Care instructions adapted under license by [employer] (which disclaims liability or warranty for this information). If you have questions about a medical condition or this instruction, always ask your healthcare professional. [place] [address] any warranty or liability for your use of this information. Influenza (Flu): Care Instructions  Your Care Instructions  Influenza (flu) is an infection in the lungs and breathing passages. It is caused by the influenza virus.  There are different strains, or types, of the flu virus from year to year. Unlike the common cold, the flu comes on suddenly and the symptoms, such as a cough, congestion, fever, chills, fatigue, aches, and pains, are more severe. These symptoms may last up to 10 days. Although the flu can make you feel very sick, it usually doesn't cause serious health problems. Home treatment is usually all you need for flu symptoms. But your doctor may prescribe antiviral medicine to prevent other health problems, such as pneumonia, from developing. Older people and those who have a long-term health condition, such as lung disease, are most at risk for having pneumonia or other health problems. Follow-up care is a key part of your treatment and safety. Be sure to make and go to all appointments, and call your doctor if you are having problems. Its also a good idea to know your test results and keep a list of the medicines you take. How can you care for yourself at home? · Get plenty of rest.  · Drink plenty of fluids, enough so that your urine is light yellow or clear like water. If you have kidney, heart, or liver disease and have to limit fluids, talk with your doctor before you increase the amount of fluids you drink. · Take an over-the-counter pain medicine if needed, such as acetaminophen (Tylenol), ibuprofen (Advil, Motrin), or naproxen (Aleve), to relieve fever, headache, and muscle aches. Read and follow all instructions on the label. No one younger than 20 should take aspirin. It has been linked to Reye syndrome, a serious illness. · Do not smoke. Smoking can make the flu worse. If you need help quitting, talk to your doctor about stop-smoking programs and medicines. These can increase your chances of quitting for good. · Breathe moist air from a hot shower or from a sink filled with hot water to help clear a stuffy nose. · Before you use cough and cold medicines, check the label. These medicines may not be safe for young children or for people with certain health problems.   · If the skin around your nose and lips becomes sore, put some petroleum jelly on the area. · To ease coughing:  ¨ Drink fluids to soothe a scratchy throat. ¨ Suck on cough drops or plain hard candy. ¨ Take an over-the-counter cough medicine that contains dextromethorphan to help you get some sleep. Read and follow all instructions on the label. ¨ Raise your head at night with an extra pillow. This may help you rest if coughing keeps you awake. · Take any prescribed medicine exactly as directed. Call your doctor if you think you are having a problem with your medicine. To avoid spreading the flu  · Wash your hands regularly, and keep your hands away from your face. · Stay home from school, work, and other public places until you are feeling better and your fever has been gone for at least 24 hours. The fever needs to have gone away on its own without the help of medicine. · Ask people living with you to talk to their doctors about preventing the flu. They may get antiviral medicine to keep from getting the flu from you. · To prevent the flu in the future, get a flu vaccine every fall. Encourage people living with you to get the vaccine. · Cover your mouth when you cough or sneeze. When should you call for help? Call 911 anytime you think you may need emergency care. For example, call if:  · You have severe trouble breathing. Call your doctor now or seek immediate medical care if:  · You have new or worse trouble breathing. · You seem to be getting much sicker. · You feel very sleepy or confused. · You have a new or higher fever. · You get a new rash. Watch closely for changes in your health, and be sure to contact your doctor if:  · You begin to get better and then get worse. · You are not getting better after 1 week. Where can you learn more? Go to http://patricia-nan.info/. Enter A013 in the search box to learn more about \"Influenza (Flu): Care Instructions. \"  Current as of: May 23, 2016  Content Version: 11.1  © 4798-2985 [employer]. Care instructions adapted under license by [employer] (which disclaims liability or warranty for this information). If you have questions about a medical condition or this instruction, always ask your healthcare professional. [address] any warranty or liability for your use of this information. We hope that we have addressed all of your medical concerns. The examination and treatment you received in the Emergency Department were for an emergent problem and were not intended as complete care. It is important that you follow up with your healthcare provider(s) for ongoing care. If your symptoms worsen or do not improve as expected, and you are unable to reach your usual health care provider(s), you should return to the Emergency Department. Today's healthcare is undergoing tremendous change, and patient satisfaction surveys are one of the many tools to assess the quality of medical care. You may receive a survey from the [employer] regarding your experience in the Emergency Department. I hope that your experience has been completely positive, particularly the medical care that I provided. As such, please participate in the survey; anything less than excellent does not meet my expectations or intentions. [facility] and [address] [place] [address] participate in nationally recognized quality of care measures. If your blood pressure is greater than 120/80, as reported below, we urge that you seek medical care to address the potential of high blood pressure, commonly known as hypertension. Hypertension can be hereditary or can be caused by certain medical conditions, pain, stress, or \"white coat syndrome. \"       Please make an appointment with your health care provider(s) for follow up of your Emergency Department visit.        VITALS:   Patient Vitals for the past 8 hrs:   Temp Pulse Resp BP SpO2   03/21/17 1622 98.5 °F (36.9 °C) 82 18 134/76 96 %          Thank you for allowing us to provide you with medical care today. We realize that you have many choices for your emergency care needs. Please choose us in the future for any continued health care needs. [address], [address]: [phone]            Recent Results (from the past 24 hour(s))   URINALYSIS W/ REFLEX CULTURE    Collection Time: 03/21/17  4:57 PM   Result Value Ref Range    Color YELLOW/STRAW      Appearance CLOUDY (A) CLEAR      Specific gravity 1.027 1.003 - 1.030      pH (UA) 5.0 5.0 - 8.0      Protein TRACE (A) NEG mg/dL    Glucose NEGATIVE  NEG mg/dL    Ketone NEGATIVE  NEG mg/dL    Bilirubin NEGATIVE  NEG      Blood NEGATIVE  NEG      Urobilinogen 0.2 0.2 - 1.0 EU/dL    Nitrites NEGATIVE  NEG      Leukocyte Esterase NEGATIVE  NEG      WBC 0-4 0 - 4 /hpf    RBC 5-10 0 - 5 /hpf    Epithelial cells MODERATE (A) FEW /lpf    Bacteria NEGATIVE  NEG /hpf    UA:UC IF INDICATED CULTURE NOT INDICATED BY UA RESULT CNI      Hyaline cast 5-10 0 - 5 /lpf   INFLUENZA A & B AG (RAPID TEST)    Collection Time: 03/21/17  4:57 PM   Result Value Ref Range    Influenza A Antigen NEGATIVE  NEG      Influenza B Antigen NEGATIVE  NEG     HCG URINE, QL. - POC    Collection Time: 03/21/17  5:42 PM   Result Value Ref Range    Pregnancy test,urine (POC) NEGATIVE  NEG         Xr Chest Pa Lat    Result Date: 3/21/2017  INDICATION: . cough, congestion COMPARISON: Previous chest xray, 9/5/2015 and 5/16/2015. [facility] FINDINGS: PA and lateral view of the chest. . Lines/tubes/surgical: None. Heart/mediastinum: Unremarkable. Lungs/pleura:  No focal consolidation or mass. No visualized pleural effusion or pneumothorax. Additional Comments: Degenerative changes in the spine. Surgical clips in the upper abdomen. [facility] IMPRESSION: 1.  No radiographic evidence of acute cardiopulmonary disease. Nausea and Vomiting: Care Instructions  Your Care Instructions    When you are nauseated, you may feel weak and sweaty and notice a lot of saliva in your mouth. Nausea often leads to vomiting. Most of the time you do not need to worry about nausea and vomiting, but they can be signs of other illnesses. Two common causes of nausea and vomiting are stomach flu and food poisoning. Nausea and vomiting from viral stomach flu will usually start to improve within 24 hours. Nausea and vomiting from food poisoning may last from 12 to 48 hours. The doctor has checked you carefully, but problems can develop later. If you notice any problems or new symptoms, get medical treatment right away. Follow-up care is a key part of your treatment and safety. Be sure to make and go to all appointments, and call your doctor if you are having problems. It's also a good idea to know your test results and keep a list of the medicines you take. How can you care for yourself at home? · To prevent dehydration, drink plenty of fluids, enough so that your urine is light yellow or clear like water. Choose water and other caffeine-free clear liquids until you feel better. If you have kidney, heart, or liver disease and have to limit fluids, talk with your doctor before you increase the amount of fluids you drink. · Rest in bed until you feel better. · When you are able to eat, try clear soups, mild foods, and liquids until all symptoms are gone for 12 to 48 hours. Other good choices include dry toast, crackers, cooked cereal, and gelatin dessert, such as Jell-O. When should you call for help? Call 911 anytime you think you may need emergency care. For example, call if:  · You passed out (lost consciousness). Call your doctor now or seek immediate medical care if:  · You have symptoms of dehydration, such as:  ¨ Dry eyes and a dry mouth. ¨ Passing only a little dark urine.   ¨ Feeling thirstier than usual.  · You have new or worsening belly pain. · You have a new or higher fever. · You vomit blood or what looks like coffee grounds. Watch closely for changes in your health, and be sure to contact your doctor if:  · You have ongoing nausea and vomiting. · Your vomiting is getting worse. · Your vomiting lasts longer than 2 days. · You are not getting better as expected. Where can you learn more? Go to http://patricia-nan.info/. Enter 25 [id number] in the search box to learn more about \"Nausea and Vomiting: Care Instructions. \"  Current as of: May 27, 2016  Content Version: 11.1  © 8682-9764 [employer]. Care instructions adapted under license by [employer] (which disclaims liability or warranty for this information). If you have questions about a medical condition or this instruction, always ask your healthcare professional. [place] [address] any warranty or liability for your use of this information.

## 2017-03-31 ENCOUNTER — OFFICE VISIT (OUTPATIENT)
Dept: FAMILY MEDICINE CLINIC | Age: 37
End: 2017-03-31

## 2017-03-31 VITALS
HEART RATE: 88 BPM | TEMPERATURE: 98.6 F | BODY MASS INDEX: 44.41 KG/M2 | HEIGHT: 68 IN | WEIGHT: 293 LBS | SYSTOLIC BLOOD PRESSURE: 151 MMHG | OXYGEN SATURATION: 99 % | DIASTOLIC BLOOD PRESSURE: 86 MMHG | RESPIRATION RATE: 16 BRPM

## 2017-03-31 DIAGNOSIS — L30.9 ECZEMA, UNSPECIFIED TYPE: Primary | ICD-10-CM

## 2017-03-31 DIAGNOSIS — F41.9 ANXIETY: ICD-10-CM

## 2017-03-31 DIAGNOSIS — I10 ESSENTIAL HYPERTENSION WITH GOAL BLOOD PRESSURE LESS THAN 140/90: ICD-10-CM

## 2017-03-31 RX ORDER — AMMONIUM LACTATE 12 G/100G
CREAM TOPICAL 2 TIMES DAILY
Qty: 280 G | Refills: 5 | Status: SHIPPED | OUTPATIENT
Start: 2017-03-31 | End: 2018-06-08

## 2017-03-31 RX ORDER — TRIAMCINOLONE ACETONIDE 5 MG/G
CREAM TOPICAL 2 TIMES DAILY
Qty: 30 G | Refills: 0 | Status: SHIPPED | OUTPATIENT
Start: 2017-03-31 | End: 2017-05-29 | Stop reason: SDUPTHER

## 2017-03-31 RX ORDER — CLONAZEPAM 1 MG/1
1 TABLET ORAL
Qty: 30 TAB | Refills: 0 | Status: SHIPPED | OUTPATIENT
Start: 2017-03-31 | End: 2017-05-09 | Stop reason: SDUPTHER

## 2017-03-31 RX ORDER — LEVOCETIRIZINE DIHYDROCHLORIDE 5 MG/1
5 TABLET, FILM COATED ORAL DAILY
Qty: 30 TAB | Refills: 5 | Status: SHIPPED | OUTPATIENT
Start: 2017-03-31 | End: 2017-10-26

## 2017-03-31 NOTE — PATIENT INSTRUCTIONS
Dermatitis: Care Instructions  Your Care Instructions  Dermatitis is the general name used for any rash or inflammation of the skin. Different kinds of dermatitis cause different kinds of rashes. Common causes of a rash include new medicines, plants (such as poison oak or poison ivy), heat, and stress. Certain illnesses can also cause a rash. An allergic reaction to something that touches your skin, such as latex, nickel, or poison ivy, is called contact dermatitis. Contact dermatitis may also be caused by something that irritates the skin, such as bleach, a chemical, or soap. These types of rashes cannot be spread from person to person. How long your rash will last depends on what caused it. Rashes may last a few days or months. Follow-up care is a key part of your treatment and safety. Be sure to make and go to all appointments, and call your doctor if you are having problems. It's also a good idea to know your test results and keep a list of the medicines you take. How can you care for yourself at home? · Do not scratch the rash. Cut your nails short, and file them smooth. Or wear gloves if this helps keep you from scratching. · Wash the area with water only. Pat dry. · Put cold, wet cloths on the rash to reduce itching. · Keep cool, and stay out of the sun. · Leave the rash open to the air as much as possible. · If the rash itches, use hydrocortisone cream. Follow the directions on the label. Calamine lotion may help for plant rashes. · Take an over-the-counter antihistamine, such as diphenhydramine (Benadryl) or loratadine (Claritin), to help calm the itching. Read and follow all instructions on the label. · If your doctor prescribed a cream, use it as directed. If your doctor prescribed medicine, take it exactly as directed. When should you call for help?   Call your doctor now or seek immediate medical care if:  · You have symptoms of infection, such as:  ¨ Increased pain, swelling, warmth, or redness. ¨ Red streaks leading from the area. ¨ Pus draining from the area. ¨ A fever. · You have joint pain along with the rash. Watch closely for changes in your health, and be sure to contact your doctor if:  · Your rash is changing or getting worse. · You are not getting better as expected. Where can you learn more? Go to http://patricia-nan.info/. Enter [id number] in the search box to learn more about \"Dermatitis: Care Instructions. \"  Current as of: October 13, 2016  Content Version: 11.2  © 5849-5041 [employer]. Care instructions adapted under license by [employer] (which disclaims liability or warranty for this information). If you have questions about a medical condition or this instruction, always ask your healthcare professional. [address] any warranty or liability for your use of this information.

## 2017-03-31 NOTE — PROGRESS NOTES
1. Have you been to the ER, urgent care clinic since your last visit? Hospitalized since your last visit? No    2. Have you seen or consulted any other health care providers outside of the [address] [place] [address] since your last visit? Include any pap smears or colon screening.  No     Chief Complaint   Patient presents with    Allergies     Seasonal

## 2017-03-31 NOTE — PROGRESS NOTES
Chief Complaint   Patient presents with    Allergies     Seasonal     she is a 44y.o. year old female who presents for evalution. Pt states has been having a lot of issues with Eczema. Uses Dove soap and has tried numerous lotions but not really helping. Currently flaring up on neck, sometimes arms and face are effected as well. Requesting rx today. Also requesting refill on Clonazepam today. Uses as needed for acute anxiety. Is not on daily medication because patient states does not feel anxious on daily basis. Reviewed PmHx, RxHx, FmHx, SocHx, AllgHx and updated and dated in the chart. Review of Systems - negative except as listed above in the HPI    Objective:     Vitals:    03/31/17 0937   BP: 151/86   Pulse: 88   Resp: 16   Temp: 98.6 °F (37 °C)   TempSrc: Oral   SpO2: 99%   Weight: 319 lb (144.7 kg)   Height: 5' 8\" (1.727 m)     Physical Examination: General appearance - alert, well appearing, and in no distress  Mental status - alert, oriented to person, place, and time, anxious  Chest - clear to auscultation, no wheezes, rales or rhonchi, symmetric air entry  Heart - normal rate, regular rhythm, normal S1, S2, no murmurs, rubs, clicks or gallops  Skin - DERMATITIS NOTED: eczematoid dermatitis on left side of neck    Assessment/ Plan:   Vero was seen today for allergies. Diagnoses and all orders for this visit:    Eczema, unspecified type  -     levocetirizine (XYZAL) 5 mg tablet; Take 1 Tab by mouth daily. -     triamcinolone (ARISTOCORT) 0.5 % topical cream; Apply  to affected area two (2) times a day. use thin layer for no more than 2 weeks  -     ammonium lactate (LAC-HYDRIN) 12 % topical cream; Apply  to affected area two (2) times a day. rub in to affected area well  New rx. Keep skin well hydrated. Anxiety  -     clonazePAM (KLONOPIN) 1 mg tablet; Take 1 Tab by mouth daily as needed. - must last 30 days  Refill provided. Checked , no suspicious fills.   Positive thinking, rely on support system. Essential hypertension with goal blood pressure less than 140/90  Encouraged pt to monitor BP at home, preferably in AM when first wakes up. Goal BP is < 130/90 if on meds. Discussed consequences of uncontrolled HTN and need for yearly eye exam. Recommended pt limit salt intake and engage in regular exercise. Continue current medications. F/U 3 mo or sooner if needed    Pt voiced understanding regarding plan of care. Follow-up Disposition:  Return if symptoms worsen or fail to improve. I have discussed the diagnosis with the patient and the intended plan as seen in the above orders. The patient has received an after-visit summary and questions were answered concerning future plans.      Medication Side Effects and Warnings were discussed with patient    Cezar Mackenzie NP

## 2017-03-31 NOTE — MR AVS SNAPSHOT
Visit Information Date & Time Provider Department Dept. Phone Encounter #  
 3/31/2017  9:30 AM Maria M Corteztamia, NP [address] [facility] [phone] [id number] Follow-up Instructions Return if symptoms worsen or fail to improve. Upcoming Health Maintenance Date Due  
 PAP AKA CERVICAL CYTOLOGY 3/31/2020 DTaP/Tdap/Td series (2 - Td) 3/31/2027 Allergies as of 3/31/2017  Review Complete On: 3/31/2017 By: Soledad Mckeon LPN Severity Noted Reaction Type Reactions Bactrim [Sulfamethoprim Ds]  12/16/2013    Hives Current Immunizations  Reviewed on 1/21/2016 No immunizations on file. Not reviewed this visit You Were Diagnosed With   
  
 Codes Comments Eczema, unspecified type    -  Primary ICD-10-CM: L30.9 ICD-9-CM: 692.9 Anxiety     ICD-10-CM: F41.9 ICD-9-CM: 300.00 Vitals BP Pulse Temp Resp Height(growth percentile) Weight(growth percentile) 151/86 (BP 1 Location: Left arm, BP Patient Position: Sitting) 88 98.6 °F (37 °C) (Oral) 16 5' 8\" (1.727 m) 319 lb (144.7 kg) LMP SpO2 BMI OB Status Smoking Status 03/27/2017 (Exact Date) 99% 48.5 kg/m2 Having regular periods Former Smoker BMI and BSA Data Body Mass Index Body Surface Area 48.5 kg/m 2 2.63 m 2 Preferred Pharmacy Pharmacy Name Phone [id number] [place], [address] [facility] [phone] Your Updated Medication List  
  
   
This list is accurate as of: 3/31/17  9:54 AM.  Always use your most recent med list.  
  
  
  
  
 * albuterol 90 mcg/actuation inhaler Commonly known as:  VENTOLIN HFA Take 1 Puff by inhalation every four (4) hours as needed for Wheezing. * albuterol 90 mcg/actuation inhaler Commonly known as:  PROVENTIL HFA, VENTOLIN HFA, PROAIR HFA Take 2 Puffs by inhalation every four (4) hours as needed for Wheezing. amLODIPine 5 mg tablet Commonly known as:  Frances Spencer Take 1 Tab by mouth daily. ammonium lactate 12 % topical cream  
Commonly known as:  LAC-HYDRIN Apply  to affected area two (2) times a day. rub in to affected area well  
  
 beclomethasone 80 mcg/actuation Aero Commonly known as:  QVAR Take 2 Puffs by inhalation two (2) times a day. clonazePAM 1 mg tablet Commonly known as:  Joel Bells Take 1 Tab by mouth daily as needed. - must last 30 days  
  
 guaiFENesin-dextromethorphan -30 mg per tablet Commonly known as:  Miguel A & Miguel A DM Take 1 Tab by mouth two (2) times a day. levocetirizine 5 mg tablet Commonly known as:  Demaris Peter Take 1 Tab by mouth daily. loratadine 10 mg tablet Commonly known as:  [place] Haylie Take 1 Tab by mouth daily. metFORMIN  mg tablet Commonly known as:  GLUCOPHAGE XR Take 1 Tab by mouth daily (with dinner). ondansetron 4 mg disintegrating tablet Commonly known as:  ZOFRAN ODT Take 1 Tab by mouth every eight (8) hours as needed for Nausea. promethazine 25 mg tablet Commonly known as:  PHENERGAN  
TAKE 1 TAB BY MOUTH EVERY EIGHT (8) HOURS AS NEEDED FOR NAUSEA.  
  
 triamcinolone 0.5 % topical cream  
Commonly known as:  ARISTOCORT Apply  to affected area two (2) times a day. use thin layer for no more than 2 weeks * Notice: This list has 2 medication(s) that are the same as other medications prescribed for you. Read the directions carefully, and ask your doctor or other care provider to review them with you. Prescriptions Printed Refills  
 clonazePAM (KLONOPIN) 1 mg tablet 0 Sig: Take 1 Tab by mouth daily as needed. - must last 30 days Class: Print Route: Oral  
  
Prescriptions Sent to Pharmacy Refills  
 levocetirizine (XYZAL) 5 mg tablet 5 Sig: Take 1 Tab by mouth daily. Class: Normal  
 Pharmacy: [facility] [address] [place] [address] Ph #: [phone]  Route: Oral  
 triamcinolone (ARISTOCORT) 0.5 % topical cream 0 Sig: Apply  to affected area two (2) times a day. use thin layer for no more than 2 weeks Class: Normal  
 Pharmacy: [facility] [address], [place] [address] Ph #: [phone] Route: Topical  
 ammonium lactate (LAC-HYDRIN) 12 % topical cream 5 Sig: Apply  to affected area two (2) times a day. rub in to affected area well Class: Normal  
 Pharmacy: [facility] [address], [place] [address] Ph #: [phone] Route: Topical  
  
Follow-up Instructions Return if symptoms worsen or fail to improve. Patient Instructions Dermatitis: Care Instructions Your Care Instructions Dermatitis is the general name used for any rash or inflammation of the skin. Different kinds of dermatitis cause different kinds of rashes. Common causes of a rash include new medicines, plants (such as poison oak or poison ivy), heat, and stress. Certain illnesses can also cause a rash. An allergic reaction to something that touches your skin, such as latex, nickel, or poison ivy, is called contact dermatitis. Contact dermatitis may also be caused by something that irritates the skin, such as bleach, a chemical, or soap. These types of rashes cannot be spread from person to person. How long your rash will last depends on what caused it. Rashes may last a few days or months. Follow-up care is a key part of your treatment and safety. Be sure to make and go to all appointments, and call your doctor if you are having problems. It's also a good idea to know your test results and keep a list of the medicines you take. How can you care for yourself at home? · Do not scratch the rash. Cut your nails short, and file them smooth. Or wear gloves if this helps keep you from scratching. · Wash the area with water only. Pat dry. · Put cold, wet cloths on the rash to reduce itching. · Keep cool, and stay out of the sun. · Leave the rash open to the air as much as possible. · If the rash itches, use hydrocortisone cream. Follow the directions on the label. Calamine lotion may help for plant rashes. · Take an over-the-counter antihistamine, such as diphenhydramine (Benadryl) or loratadine (Claritin), to help calm the itching. Read and follow all instructions on the label. · If your doctor prescribed a cream, use it as directed. If your doctor prescribed medicine, take it exactly as directed. When should you call for help? Call your doctor now or seek immediate medical care if: 
· You have symptoms of infection, such as: 
¨ Increased pain, swelling, warmth, or redness. ¨ Red streaks leading from the area. ¨ Pus draining from the area. ¨ A fever. · You have joint pain along with the rash. Watch closely for changes in your health, and be sure to contact your doctor if: 
· Your rash is changing or getting worse. · You are not getting better as expected. Where can you learn more? Go to http://patricia-nan.info/. Enter [phone] in the search box to learn more about \"Dermatitis: Care Instructions. \" Current as of: October 13, 2016 Content Version: 11.2 © 9154-8942 Pinkdingo. Care instructions adapted under license by [employer] (which disclaims liability or warranty for this information). If you have questions about a medical condition or this instruction, always ask your healthcare professional. [place] [address] any warranty or liability for your use of this information. Introducing [facility] & HE[facility]! Josue Heranndez introduces [email or web address] patient portal. Now you can access parts of your medical record, email your doctor's office, and request medication refills online. 1. In your internet browser, go to [email or web address] 2. Click on the First Time User? Click Here link in the Sign In box. You will see the New Member Sign Up page. 3. Enter your Watchsend Access Code exactly as it appears below. You will not need to use this code after youve completed the sign-up process. If you do not sign up before the expiration date, you must request a new code. · Watchsend Access Code: [email or web address] Expires: 4/5/2017  1:06 PM 
 
4. Enter the last four digits of your Social Security Number (xxxx) and Date of Birth (mm/dd/yyyy) as indicated and click Submit. You will be taken to the next sign-up page. 5. Create a Watchsend ID. This will be your Watchsend login ID and cannot be changed, so think of one that is secure and easy to remember. 6. Create a Watchsend password. You can change your password at any time. 7. Enter your Password Reset Question and Answer. This can be used at a later time if you forget your password. 8. Enter your e-mail address. You will receive e-mail notification when new information is available in [address] 9. Click Sign Up. You can now view and download portions of your medical record. 10. Click the Download Summary menu link to download a portable copy of your medical information. If you have questions, please visit the Frequently Asked Questions section of the Watchsend website. Remember, Watchsend is NOT to be used for urgent needs. For medical emergencies, dial 911. Now available from your iPhone and Android! Please provide this summary of care documentation to your next provider. Your primary care clinician is listed as Ira Smith. If you have any questions after today's visit, please call [phone].

## 2017-04-17 ENCOUNTER — OFFICE VISIT (OUTPATIENT)
Dept: FAMILY MEDICINE CLINIC | Age: 37
End: 2017-04-17

## 2017-04-17 VITALS
SYSTOLIC BLOOD PRESSURE: 112 MMHG | WEIGHT: 293 LBS | OXYGEN SATURATION: 98 % | HEIGHT: 68 IN | HEART RATE: 78 BPM | RESPIRATION RATE: 16 BRPM | DIASTOLIC BLOOD PRESSURE: 70 MMHG | TEMPERATURE: 98.7 F | BODY MASS INDEX: 44.41 KG/M2

## 2017-04-17 DIAGNOSIS — H10.13 ALLERGIC CONJUNCTIVITIS, BILATERAL: ICD-10-CM

## 2017-04-17 DIAGNOSIS — J30.9 ALLERGIC RHINITIS, UNSPECIFIED ALLERGIC RHINITIS TRIGGER, UNSPECIFIED RHINITIS SEASONALITY: Primary | ICD-10-CM

## 2017-04-17 RX ORDER — METHYLPREDNISOLONE 4 MG/1
TABLET ORAL
Qty: 1 DOSE PACK | Refills: 0 | Status: SHIPPED | OUTPATIENT
Start: 2017-04-17 | End: 2017-05-09 | Stop reason: ALTCHOICE

## 2017-04-17 RX ORDER — AZELASTINE 1 MG/ML
1 SPRAY, METERED NASAL 2 TIMES DAILY
Qty: 1 BOTTLE | Refills: 5 | Status: SHIPPED | OUTPATIENT
Start: 2017-04-17 | End: 2017-05-09 | Stop reason: ALTCHOICE

## 2017-04-17 RX ORDER — CROMOLYN SODIUM 40 MG/ML
1 SOLUTION/ DROPS OPHTHALMIC 4 TIMES DAILY
Qty: 10 ML | Refills: 5 | Status: SHIPPED | OUTPATIENT
Start: 2017-04-17 | End: 2017-09-08

## 2017-04-17 NOTE — PROGRESS NOTES
1. Have you been to the ER, urgent care clinic since your last visit? Hospitalized since your last visit? No    2. Have you seen or consulted any other health care providers outside of the [facility] since your last visit? Include any pap smears or colon screening.  No     Chief Complaint   Patient presents with    Allergies

## 2017-04-17 NOTE — MR AVS SNAPSHOT
Visit Information Date & Time Provider Department Dept. Phone Encounter #  
 4/17/2017  9:30 AM Darlin Moctezuma NP [address] [facility] [phone] [id number] Follow-up Instructions Return if symptoms worsen or fail to improve. Upcoming Health Maintenance Date Due  
 PAP AKA CERVICAL CYTOLOGY 3/31/2020 DTaP/Tdap/Td series (2 - Td) 3/31/2027 Allergies as of 4/17/2017  Review Complete On: 4/17/2017 By: Glory Valenzuela LPN Severity Noted Reaction Type Reactions Bactrim [Sulfamethoprim Ds]  12/16/2013    Hives Current Immunizations  Reviewed on 1/21/2016 No immunizations on file. Not reviewed this visit You Were Diagnosed With   
  
 Codes Comments Allergic rhinitis, unspecified allergic rhinitis trigger, unspecified rhinitis seasonality    -  Primary ICD-10-CM: J30.9 ICD-9-CM: 477.9 Allergic conjunctivitis, bilateral     ICD-10-CM: H10.13 ICD-9-CM: 372.14 Vitals BP Pulse Temp Resp Height(growth percentile) Weight(growth percentile) 112/70 (BP 1 Location: Right arm, BP Patient Position: Sitting) 78 98.7 °F (37.1 °C) (Oral) 16 5' 8\" (1.727 m) 323 lb (146.5 kg) LMP SpO2 BMI OB Status Smoking Status 03/27/2017 (Exact Date) 98% 49.11 kg/m2 Having regular periods Former Smoker Vitals History BMI and BSA Data Body Mass Index Body Surface Area  
 49.11 kg/m 2 2.65 m 2 [id number] [address] [facility] [phone] Your Updated Medication List  
  
   
This list is accurate as of: 4/17/17 10:01 AM.  Always use your most recent med list.  
  
  
  
  
 albuterol 90 mcg/actuation inhaler Commonly known as:  PROVENTIL HFA, VENTOLIN HFA, PROAIR HFA Take 2 Puffs by inhalation every four (4) hours as needed for Wheezing. amLODIPine 5 mg tablet Commonly known as:  [place] Take 1 Tab by mouth daily. ammonium lactate 12 % topical cream  
Commonly known as:  LAC-HYDRIN Apply  to affected area two (2) times a day. rub in to affected area well  
  
 azelastine 137 mcg (0.1 %) nasal spray Commonly known as:  ASTELIN  
1 Spray by Both Nostrils route two (2) times a day. Use in each nostril as directed  
  
 beclomethasone 80 mcg/actuation Aero Commonly known as:  QVAR Take 2 Puffs by inhalation two (2) times a day. clonazePAM 1 mg tablet Commonly known as:  Jeani Moulding Take 1 Tab by mouth daily as needed. - must last 30 days  
  
 cromolyn 4 % ophthalmic solution Commonly known as:  OPTICROM Administer 1 Drop to both eyes four (4) times daily. Use in affected eye(s)  
  
 levocetirizine 5 mg tablet Commonly known as:  Wolfgang Schrobyn Take 1 Tab by mouth daily. loratadine 10 mg tablet Commonly known as:  Gerri Gills Take 1 Tab by mouth daily. metFORMIN  mg tablet Commonly known as:  GLUCOPHAGE XR Take 1 Tab by mouth daily (with dinner). methylPREDNISolone 4 mg tablet Commonly known as:  Brand Saint Use as directed  
  
 ondansetron 4 mg disintegrating tablet Commonly known as:  ZOFRAN ODT Take 1 Tab by mouth every eight (8) hours as needed for Nausea. promethazine 25 mg tablet Commonly known as:  PHENERGAN  
TAKE 1 TAB BY MOUTH EVERY EIGHT (8) HOURS AS NEEDED FOR NAUSEA.  
  
 triamcinolone 0.5 % topical cream  
Commonly known as:  ARISTOCORT Apply  to affected area two (2) times a day. use thin layer for no more than 2 weeks Prescriptions Sent to Pharmacy Refills  
 methylPREDNISolone (MEDROL DOSEPACK) 4 mg tablet 0 Sig: Use as directed Class: Normal  
 Pharmacy: [id number] [place] Ph #: [phone]  
 azelastine (ASTELIN) 137 mcg (0.1 %) nasal spray 5 [place] by Both Nostrils route two (2) times a day. Use in each nostril as directed  Class: Normal  
 Pharmacy: [place] [address] Ph #: [phone] Route: Both Nostrils  
 cromolyn (OPTICROM) 4 % ophthalmic solution 5 Sig: Administer 1 Drop to both eyes four (4) times daily. Use in affected eye(s) Class: Normal  
 Pharmacy: [facility] [address], [place] [address] Ph #: [phone] Route: Both Eyes We Performed the Following REFERRAL TO ALLERGY [REF5 Custom] Comments:  
 Please evaluate patient for allergies Follow-up Instructions Return if symptoms worsen or fail to improve. Referral Information Referral ID Referred By Referred To  
  
 [id number] César WARNER Che, MD   
   [address] [place], [place] [address]  Phone: [phone] Fax: [phone] Visits Status Start Date End Date 1 New Request 4/17/17 4/17/18 If your referral has a status of pending review or denied, additional information will be sent to support the outcome of this decision. Patient Instructions Allergies: Care Instructions Your Care Instructions Allergies occur when your body's defense system (immune system) overreacts to certain substances. The immune system treats a harmless substance as if it were a harmful germ or virus. Many things can cause this overreaction, including pollens, medicine, food, dust, animal dander, and mold. Allergies can be mild or severe. Mild allergies can be managed with home treatment. But medicine may be needed to prevent problems. Managing your allergies is an important part of staying healthy. Your doctor may suggest that you have allergy testing to help find out what is causing your allergies. When you know what things trigger your symptoms, you can avoid them. This can prevent allergy symptoms and other health problems.  
For severe allergies that cause reactions that affect your whole body (anaphylactic reactions), your doctor may prescribe a shot of epinephrine to carry with you in case you have a severe reaction. Learn how to give yourself the shot and keep it with you at all times. Make sure it is not . Follow-up care is a key part of your treatment and safety. Be sure to make and go to all appointments, and call your doctor if you are having problems. It's also a good idea to know your test results and keep a list of the medicines you take. How can you care for yourself at home? · If you have been told by your doctor that dust or dust mites are causing your allergy, decrease the dust around your bed: 
[facility] sheets, pillowcases, and other bedding in hot water every week. ¨ Use dust-proof covers for pillows, duvets, and mattresses. Avoid plastic covers because they tear easily and do not \"breathe. \" Wash as instructed on the label. ¨ Do not use any blankets and pillows that you do not need. ¨ Use blankets that you can wash in your washing machine. ¨ Consider removing drapes and carpets, which attract and hold dust, from your bedroom. · If you are allergic to house dust and mites, do not use home humidifiers. Your doctor can suggest ways you can control dust and mites. · Look for signs of cockroaches. Cockroaches cause allergic reactions. Use cockroach baits to get rid of them. Then, clean your home well. Cockroaches like areas where grocery bags, newspapers, empty bottles, or cardboard boxes are stored. Do not keep these inside your home, and keep trash and food containers sealed. Seal off any spots where cockroaches might enter your home. · If you are allergic to mold, get rid of furniture, rugs, and drapes that smell musty. Check for mold in the bathroom. · If you are allergic to outdoor pollen or mold spores, use air-conditioning. Change or clean all filters every month. Keep windows closed. · If you are allergic to pollen, stay inside when pollen counts are high. Use a vacuum  with a HEPA filter or a double-thickness filter at least two times each week. · Stay inside when air pollution is bad. Avoid paint fumes, perfumes, and other strong odors. · Avoid conditions that make your allergies worse. Stay away from smoke. Do not smoke or let anyone else smoke in your house. Do not use fireplaces or wood-burning stoves. · If you are allergic to your pets, change the air filter in your furnace every month. Use high-efficiency filters. · If you are allergic to pet dander, keep pets outside or out of your bedroom. Old carpet and cloth furniture can hold a lot of animal dander. You may need to replace them. When should you call for help? Give an epinephrine shot if: 
· You think you are having a severe allergic reaction. · You have symptoms in more than one body area, such as mild nausea and an itchy mouth. After giving an epinephrine shot call 911, even if you feel better. Call 911 if: 
· You have symptoms of a severe allergic reaction. These may include: 
¨ Sudden raised, red areas (hives) all over your body. ¨ Swelling of the throat, mouth, lips, or tongue. ¨ Trouble breathing. ¨ Passing out (losing consciousness). Or you may feel very lightheaded or suddenly feel weak, confused, or restless. · You have been given an epinephrine shot, even if you feel better. Call your doctor now or seek immediate medical care if: 
· You have symptoms of an allergic reaction, such as: ¨ A rash or hives (raised, red areas on the skin). ¨ Itching. ¨ Swelling. ¨ Belly pain, nausea, or vomiting. Watch closely for changes in your health, and be sure to contact your doctor if: 
· You do not get better as expected. Where can you learn more? Go to http://patricia-nan.info/. Enter I708 in the search box to learn more about \"Allergies: Care Instructions. \" Current as of: February 12, 2016 Content Version: 11.2 © 9314-0038 Healthwise, Incorporated. Care instructions adapted under license by [employer] (which disclaims liability or warranty for this information). If you have questions about a medical condition or this instruction, always ask your healthcare professional. [address] any warranty or liability for your use of this information. Introducing [facility] & HE[facility] SERVICES! Jacquelyn Iriss introduces Weeve patient portal. Now you can access parts of your medical record, email your doctor's office, and request medication refills online. 1. In your internet browser, go to [email or web address] 2. Click on the First Time User? Click Here link in the Sign In box. You will see the New Member Sign Up page. 3. Enter your Weeve Access Code exactly as it appears below. You will not need to use this code after youve completed the sign-up process. If you do not sign up before the expiration date, you must request a new code. · Weeve Access Code: [email or web address] Expires: 7/16/2017  9:43 AM 
 
4. Enter the last four digits of your Social Security Number (xxxx) and Date of Birth (mm/dd/yyyy) as indicated and click Submit. You will be taken to the next sign-up page. 5. Create a Weeve ID. This will be your Weeve login ID and cannot be changed, so think of one that is secure and easy to remember. 6. Create a Weeve password. You can change your password at any time. 7. Enter your Password Reset Question and Answer. This can be used at a later time if you forget your password. 8. Enter your e-mail address. You will receive e-mail notification when new information is available in [address] 9. Click Sign Up. You can now view and download portions of your medical record. 10. Click the Download Summary menu link to download a portable copy of your medical information.  
 
If you have questions, please visit the Frequently Asked Questions section of the Pepper Networks. Remember, Wallitt is NOT to be used for urgent needs. For medical emergencies, dial 911. Now available from your iPhone and Android! Please provide this summary of care documentation to your next provider. Your primary care clinician is listed as [place]. If you have any questions after today's visit, please call [phone].

## 2017-04-17 NOTE — PATIENT INSTRUCTIONS

## 2017-04-17 NOTE — PROGRESS NOTES
Chief Complaint   Patient presents with    Allergies     she is a 44y.o. year old female who presents for evalution. Pt has been taking Claritin, Xyzal, Benadryl but none are helping with allergies. Lots of sneezing, very itchy, eyes watering, bad sinus headaches. No fever or chills. Just very frustrated with pollen, makes her feel terrible. Course is constant. Pt states had allergy shots as child, requesting referral today. Reviewed PmHx, RxHx, FmHx, SocHx, AllgHx and updated and dated in the chart. Review of Systems - negative except as listed above in the HPI    Objective:     Vitals:    04/17/17 0947   BP: 112/70   Pulse: 78   Resp: 16   Temp: 98.7 °F (37.1 °C)   TempSrc: Oral   SpO2: 98%   Weight: 323 lb (146.5 kg)   Height: 5' 8\" (1.727 m)     Physical Examination: General appearance - alert, well appearing, and in no distress  Ears - bilateral TM's and external ear canals normal  Nose - normal nontender sinuses, mucosal congestion and mucosal pallor  Mouth - mucous membranes moist, pharynx normal without lesions and PND  Neck - supple, no significant adenopathy  Chest - clear to auscultation, no wheezes, rales or rhonchi, symmetric air entry  Heart - normal rate, regular rhythm, normal S1, S2, no murmurs, rubs, clicks or gallops    Assessment/ Plan:   Vero was seen today for allergies. Diagnoses and all orders for this visit:    Allergic rhinitis, unspecified allergic rhinitis trigger, unspecified rhinitis seasonality  -     methylPREDNISolone (MEDROL DOSEPACK) 4 mg tablet; Use as directed  -     azelastine (ASTELIN) 137 mcg (0.1 %) nasal spray; 1 [place] by Both Nostrils route two (2) times a day. Use in each nostril as directed  -     REFERRAL TO ALLERGY  New rxs. F/U prn    Allergic conjunctivitis, bilateral  -     cromolyn (OPTICROM) 4 % ophthalmic solution; Administer 1 Drop to both eyes four (4) times daily. Use in affected eye(s)  -     REFERRAL TO ALLERGY   New rx.   F/U prn    Pt voiced understanding regarding plan of care. Follow-up Disposition:  Return if symptoms worsen or fail to improve. I have discussed the diagnosis with the patient and the intended plan as seen in the above orders. The patient has received an after-visit summary and questions were answered concerning future plans.      Medication Side Effects and Warnings were discussed with patient    Osmin Buenrostro NP

## 2017-05-09 ENCOUNTER — OFFICE VISIT (OUTPATIENT)
Dept: FAMILY MEDICINE CLINIC | Age: 37
End: 2017-05-09

## 2017-05-09 VITALS
BODY MASS INDEX: 44.41 KG/M2 | HEART RATE: 96 BPM | OXYGEN SATURATION: 96 % | SYSTOLIC BLOOD PRESSURE: 138 MMHG | WEIGHT: 293 LBS | RESPIRATION RATE: 18 BRPM | TEMPERATURE: 98.5 F | HEIGHT: 68 IN | DIASTOLIC BLOOD PRESSURE: 94 MMHG

## 2017-05-09 DIAGNOSIS — F41.9 ANXIETY: ICD-10-CM

## 2017-05-09 RX ORDER — DULOXETIN HYDROCHLORIDE 60 MG/1
60 CAPSULE, DELAYED RELEASE ORAL DAILY
Qty: 30 CAP | Refills: 5 | Status: SHIPPED | OUTPATIENT
Start: 2017-05-09 | End: 2017-06-10 | Stop reason: ALTCHOICE

## 2017-05-09 RX ORDER — CLONAZEPAM 1 MG/1
1 TABLET ORAL
Qty: 30 TAB | Refills: 0 | Status: SHIPPED | OUTPATIENT
Start: 2017-05-09 | End: 2017-06-20 | Stop reason: SDUPTHER

## 2017-05-09 NOTE — PROGRESS NOTES
Glo Goldsmith is a 44 y.o. female. HPI  She is here for refill of klonopin  Not on med for anxiety daily; using klonopin prn but having freq panic attacks    ROS  A comprehensive review of system was obtained and negative except findings in the HPI    Visit Vitals    BP (!) 138/94    Pulse 96    Temp 98.5 °F (36.9 °C) (Oral)    Resp 18    Ht 5' 8\" (1.727 m)    Wt 322 lb 6 oz (146.2 kg)    LMP 04/20/2017 (Approximate)    SpO2 96%    BMI 49.02 kg/m2     Physical Exam   Constitutional: She is oriented to person, place, and time. She appears well-developed and well-nourished. Neck: No JVD present. Cardiovascular: Normal rate, regular rhythm and intact distal pulses. Exam reveals no gallop and no friction rub. No murmur heard. Pulmonary/Chest: Effort normal and breath sounds normal. No respiratory distress. She has no wheezes. Musculoskeletal: She exhibits no edema. Neurological: She is alert and oriented to person, place, and time. Skin: Skin is warm. Nursing note and vitals reviewed. ASSESSMENT and PLAN  Encounter Diagnoses   Name Primary?  Anxiety      Orders Placed This Encounter    clonazePAM (KLONOPIN) 1 mg tablet    DULoxetine (CYMBALTA) 60 mg capsule     Recheck 3 weeks for med evaluation  Adr/se reviewed   I have discussed the diagnosis with the patient and the intended plan as seen in the above orders. The patient has received an after-visit summary and questions were answered concerning future plans. Patient conveyed understanding of the plan at the time of the visit.     Sarkis Mendez, MSN, ANP  5/9/2017

## 2017-05-09 NOTE — LETTER
5/9/2017 9:26 AM 
 
Ms. Pily Leo Mary Rich [phone] Patient was seen in the office today for an anxiety evaluation.  
 
 
 
Sincerely, 
 
 
Ama Nicholas NP

## 2017-05-09 NOTE — PROGRESS NOTES
1. Have you been to the ER, urgent care clinic since your last visit? Hospitalized since your last visit? No    2. Have you seen or consulted any other health care providers outside of the [address] [place] [address] since your last visit? Include any pap smears or colon screening.  No    Chief Complaint   Patient presents with    Follow-up     med ck

## 2017-05-09 NOTE — MR AVS SNAPSHOT
Visit Information Date & Time Provider Department Dept. Phone Encounter #  
 5/9/2017  8:45 AM James Aguilar NP [facility] [phone] [id number] Follow-up Instructions Return in about 3 weeks (around 5/30/2017) for med check. Upcoming Health Maintenance Date Due INFLUENZA AGE 9 TO ADULT 8/1/2017 PAP AKA CERVICAL CYTOLOGY 3/31/2020 DTaP/Tdap/Td series (2 - Td) 3/31/2027 Allergies as of 5/9/2017  Review Complete On: 5/9/2017 By: James Aguilar NP Severity Noted Reaction Type Reactions Bactrim [Sulfamethoprim Ds]  12/16/2013    Hives Current Immunizations  Reviewed on 1/21/2016 No immunizations on file. Not reviewed this visit You Were Diagnosed With   
  
 Codes Comments Anxiety     ICD-10-CM: F41.9 ICD-9-CM: 300.00 Vitals BP Pulse Temp Resp Height(growth percentile) Weight(growth percentile) (!) 138/94 96 98.5 °F (36.9 °C) (Oral) 18 5' 8\" (1.727 m) 322 lb 6 oz (146.2 kg) LMP SpO2 BMI OB Status Smoking Status 04/20/2017 (Approximate) 96% 49.02 kg/m2 Having regular periods Former Smoker Vitals History BMI and BSA Data Body Mass Index Body Surface Area 49.02 kg/m 2 2.65 m 2 Preferred Pharmacy Pharmacy Name Phone [id number], [address] [facility] [phone] Your Updated Medication List  
  
   
This list is accurate as of: 5/9/17  9:27 AM.  Always use your most recent med list.  
  
  
  
  
 albuterol 90 mcg/actuation inhaler Commonly known as:  PROVENTIL HFA, VENTOLIN HFA, PROAIR HFA Take 2 Puffs by inhalation every four (4) hours as needed for Wheezing. amLODIPine 5 mg tablet Commonly known as:  Yamini Bubba Take 1 Tab by mouth daily. ammonium lactate 12 % topical cream  
Commonly known as:  LAC-HYDRIN Apply  to affected area two (2) times a day. rub in to affected area well beclomethasone 80 mcg/actuation FoodShootr Commonly known as:  QVAR Take 2 Puffs by inhalation two (2) times a day. clonazePAM 1 mg tablet Commonly known as:  Roula Mons Take 1 Tab by mouth daily as needed. - must last 30 days  
  
 cromolyn 4 % ophthalmic solution Commonly known as:  OPTICROM Administer 1 Drop to both eyes four (4) times daily. Use in affected eye(s) DULoxetine 60 mg capsule Commonly known as:  CYMBALTA Take 1 Cap by mouth daily. levocetirizine 5 mg tablet Commonly known as:  Bunny Drones Take 1 Tab by mouth daily. triamcinolone 0.5 % topical cream  
Commonly known as:  ARISTOCORT Apply  to affected area two (2) times a day. use thin layer for no more than 2 weeks Prescriptions Printed Refills  
 clonazePAM (KLONOPIN) 1 mg tablet 0 Sig: Take 1 Tab by mouth daily as needed. - must last 30 days Class: Print Route: Oral  
  
Prescriptions Sent to Pharmacy Refills DULoxetine (CYMBALTA) 60 mg capsule 5 Sig: Take 1 Cap by mouth daily. Class: Normal  
 Pharmacy: [facility] Ph #: [phone] Route: Oral  
  
Follow-up Instructions Return in about 3 weeks (around 5/30/2017) for med check. Introducing [facility]! [employer] introduces [employer] patient portal. Now you can access parts of your medical record, email your doctor's office, and request medication refills online. 1. In your internet browser, go to [email or web address] 2. Click on the First Time User? Click Here link in the Sign In box. You will see the New Member Sign Up page. 3. Enter your [employer] Access Code exactly as it appears below. You will not need to use this code after youve completed the sign-up process. If you do not sign up before the expiration date, you must request a new code. · [employer] Access Code: [email or web address] Expires: 7/16/2017  9:43 AM 
 
 4. Enter the last four digits of your Social Security Number (xxxx) and Date of Birth (mm/dd/yyyy) as indicated and click Submit. You will be taken to the next sign-up page. 5. Create a Grid Mobile ID. This will be your Grid Mobile login ID and cannot be changed, so think of one that is secure and easy to remember. 6. Create a Grid Mobile password. You can change your password at any time. 7. Enter your Password Reset Question and Answer. This can be used at a later time if you forget your password. 8. Enter your e-mail address. You will receive e-mail notification when new information is available in [address] 9. Click Sign Up. You can now view and download portions of your medical record. 10. Click the Download Summary menu link to download a portable copy of your medical information. If you have questions, please visit the Frequently Asked Questions section of the Grid Mobile website. Remember, Grid Mobile is NOT to be used for urgent needs. For medical emergencies, dial 911. Now available from your iPhone and Android! Please provide this summary of care documentation to your next provider. Your primary care clinician is listed as Marquis Parks. If you have any questions after today's visit, please call [phone].

## 2017-06-10 ENCOUNTER — HOSPITAL ENCOUNTER (EMERGENCY)
Age: 37
Discharge: HOME | End: 2017-06-10
Attending: EMERGENCY MEDICINE
Payer: MEDICAID

## 2017-06-10 VITALS
RESPIRATION RATE: 16 BRPM | OXYGEN SATURATION: 98 % | SYSTOLIC BLOOD PRESSURE: 184 MMHG | TEMPERATURE: 98.4 F | WEIGHT: 293 LBS | BODY MASS INDEX: 45.99 KG/M2 | DIASTOLIC BLOOD PRESSURE: 100 MMHG | HEART RATE: 105 BPM | HEIGHT: 67 IN

## 2017-06-10 DIAGNOSIS — R10.84 ABDOMINAL PAIN, GENERALIZED: ICD-10-CM

## 2017-06-10 DIAGNOSIS — N30.00 ACUTE CYSTITIS WITHOUT HEMATURIA: Primary | ICD-10-CM

## 2017-06-10 LAB
APPEARANCE UR: ABNORMAL
BACTERIA URNS QL MICRO: ABNORMAL /HPF
BILIRUB UR QL CFM: NEGATIVE
COLOR UR: ABNORMAL
EPITH CASTS URNS QL MICRO: ABNORMAL /LPF
GLUCOSE UR STRIP.AUTO-MCNC: NEGATIVE MG/DL
HCG UR QL: NEGATIVE
HGB UR QL STRIP: NEGATIVE
KETONES UR QL STRIP.AUTO: NEGATIVE MG/DL
LEUKOCYTE ESTERASE UR QL STRIP.AUTO: NEGATIVE
MUCOUS THREADS URNS QL MICRO: ABNORMAL /LPF
NITRITE UR QL STRIP.AUTO: NEGATIVE
PH UR STRIP: 6 [PH] (ref 5–8)
PROT UR STRIP-MCNC: ABNORMAL MG/DL
RBC #/AREA URNS HPF: ABNORMAL /HPF (ref 0–5)
SP GR UR REFRACTOMETRY: 1.02 (ref 1–1.03)
UA: UC IF INDICATED,UAUC: ABNORMAL
UROBILINOGEN UR QL STRIP.AUTO: 0.2 EU/DL (ref 0.2–1)
WBC URNS QL MICRO: ABNORMAL /HPF (ref 0–4)

## 2017-06-10 PROCEDURE — 74011250637 HC RX REV CODE- 250/637: Performed by: EMERGENCY MEDICINE

## 2017-06-10 PROCEDURE — 81001 URINALYSIS AUTO W/SCOPE: CPT | Performed by: EMERGENCY MEDICINE

## 2017-06-10 PROCEDURE — 81025 URINE PREGNANCY TEST: CPT

## 2017-06-10 PROCEDURE — 87086 URINE CULTURE/COLONY COUNT: CPT | Performed by: EMERGENCY MEDICINE

## 2017-06-10 PROCEDURE — 99283 EMERGENCY DEPT VISIT LOW MDM: CPT

## 2017-06-10 RX ORDER — AMOXICILLIN AND CLAVULANATE POTASSIUM 875; 125 MG/1; MG/1
1 TABLET, FILM COATED ORAL 2 TIMES DAILY
Qty: 20 TAB | Refills: 0 | Status: SHIPPED | OUTPATIENT
Start: 2017-06-10 | End: 2017-06-20

## 2017-06-10 RX ORDER — IBUPROFEN 600 MG/1
600 TABLET ORAL
Status: COMPLETED | OUTPATIENT
Start: 2017-06-10 | End: 2017-06-10

## 2017-06-10 RX ADMIN — IBUPROFEN 600 MG: 600 TABLET, FILM COATED ORAL at 19:46

## 2017-06-11 NOTE — DISCHARGE INSTRUCTIONS
Abdominal Pain: Care Instructions  Your Care Instructions    Abdominal pain has many possible causes. Some aren't serious and get better on their own in a few days. Others need more testing and treatment. If your pain continues or gets worse, you need to be rechecked and may need more tests to find out what is wrong. You may need surgery to correct the problem. Don't ignore new symptoms, such as fever, nausea and vomiting, urination problems, pain that gets worse, and dizziness. These may be signs of a more serious problem. Your doctor may have recommended a follow-up visit in the next 8 to 12 hours. If you are not getting better, you may need more tests or treatment. The doctor has checked you carefully, but problems can develop later. If you notice any problems or new symptoms, get medical treatment right away. Follow-up care is a key part of your treatment and safety. Be sure to make and go to all appointments, and call your doctor if you are having problems. It's also a good idea to know your test results and keep a list of the medicines you take. How can you care for yourself at home? · Rest until you feel better. · To prevent dehydration, drink plenty of fluids, enough so that your urine is light yellow or clear like water. Choose water and other caffeine-free clear liquids until you feel better. If you have kidney, heart, or liver disease and have to limit fluids, talk with your doctor before you increase the amount of fluids you drink. · If your stomach is upset, eat mild foods, such as rice, dry toast or crackers, bananas, and applesauce. Try eating several small meals instead of two or three large ones. · Wait until 48 hours after all symptoms have gone away before you have spicy foods, alcohol, and drinks that contain caffeine. · Do not eat foods that are high in fat. · Avoid anti-inflammatory medicines such as aspirin, ibuprofen (Advil, Motrin), and naproxen (Aleve).  These can cause stomach upset. Talk to your doctor if you take daily aspirin for another health problem. When should you call for help? Call 911 anytime you think you may need emergency care. For example, call if:  · You passed out (lost consciousness). · You pass maroon or very bloody stools. · You vomit blood or what looks like coffee grounds. · You have new, severe belly pain. Call your doctor now or seek immediate medical care if:  · Your pain gets worse, especially if it becomes focused in one area of your belly. · You have a new or higher fever. · Your stools are black and look like tar, or they have streaks of blood. · You have unexpected vaginal bleeding. · You have symptoms of a urinary tract infection. These may include:  ¨ Pain when you urinate. ¨ Urinating more often than usual.  ¨ Blood in your urine. · You are dizzy or lightheaded, or you feel like you may faint. Watch closely for changes in your health, and be sure to contact your doctor if:  · You are not getting better after 1 day (24 hours). Where can you learn more? Go to http://patriciaMyPrepAppnan.info/. Enter G286 in the search box to learn more about \"Abdominal Pain: Care Instructions. \"  Current as of: May 27, 2016  Content Version: 11.2  © 2609-4315 [employer]. Care instructions adapted under license by [employer] (which disclaims liability or warranty for this information). If you have questions about a medical condition or this instruction, always ask your healthcare professional. [place] [address] any warranty or liability for your use of this information. Urinary Tract Infection in Women: Care Instructions  Your Care Instructions    A urinary tract infection, or UTI, is a general term for an infection anywhere between the kidneys and the urethra (where urine comes out). Most UTIs are bladder infections. They often cause pain or burning when you urinate.   UTIs are caused by bacteria and can be cured with antibiotics. Be sure to complete your treatment so that the infection goes away. Follow-up care is a key part of your treatment and safety. Be sure to make and go to all appointments, and call your doctor if you are having problems. It's also a good idea to know your test results and keep a list of the medicines you take. How can you care for yourself at home? · Take your antibiotics as directed. Do not stop taking them just because you feel better. You need to take the full course of antibiotics. · Drink extra water and other fluids for the next day or two. This may help wash out the bacteria that are causing the infection. (If you have kidney, heart, or liver disease and have to limit fluids, talk with your doctor before you increase your fluid intake.)  · Avoid drinks that are carbonated or have caffeine. They can irritate the bladder. · Urinate often. Try to empty your bladder each time. · To relieve pain, take a hot bath or lay a heating pad set on low over your lower belly or genital area. Never go to sleep with a heating pad in place. To prevent UTIs  · Drink plenty of water each day. This helps you urinate often, which clears bacteria from your system. (If you have kidney, heart, or liver disease and have to limit fluids, talk with your doctor before you increase your fluid intake.)  · Urinate when you need to. · Urinate right after you have sex. · Change sanitary pads often. · Avoid douches, bubble baths, feminine hygiene sprays, and other feminine hygiene products that have deodorants. · After going to the bathroom, wipe from front to back. When should you call for help? Call your doctor now or seek immediate medical care if:  · Symptoms such as fever, chills, nausea, or vomiting get worse or appear for the first time. · You have new pain in your back just below your rib cage. This is called flank pain. · There is new blood or pus in your urine.   · You have any problems with your antibiotic medicine. Watch closely for changes in your health, and be sure to contact your doctor if:  · You are not getting better after taking an antibiotic for 2 days. · Your symptoms go away but then come back. Where can you learn more? Go to http://patricia-nan.info/. Enter O768 in the search box to learn more about \"Urinary Tract Infection in Women: Care Instructions. \"  Current as of: November 28, 2016  Content Version: 11.2  © 5474-5060 [employer]. Care instructions adapted under license by [employer] (which disclaims liability or warranty for this information). If you have questions about a medical condition or this instruction, always ask your healthcare professional. [address] any warranty or liability for your use of this information. We hope that we have addressed all of your medical concerns. The examination and treatment you received in the Emergency Department were for an emergent problem and were not intended as complete care. It is important that you follow up with your healthcare provider(s) for ongoing care. If your symptoms worsen or do not improve as expected, and you are unable to reach your usual health care provider(s), you should return to the Emergency Department. Today's healthcare is undergoing tremendous change, and patient satisfaction surveys are one of the many tools to assess the quality of medical care. You may receive a survey from the [employer] regarding your experience in the Emergency Department. I hope that your experience has been completely positive, particularly the medical care that I provided. As such, please participate in the survey; anything less than excellent does not meet my expectations or intentions. [facility] and [facility] participate in nationally recognized quality of care measures.   If your blood pressure is greater than 120/80, as reported below, we urge that you seek medical care to address the potential of high blood pressure, commonly known as hypertension. Hypertension can be hereditary or can be caused by certain medical conditions, pain, stress, or \"white coat syndrome. \"       Please make an appointment with your health care provider(s) for follow up of your Emergency Department visit. VITALS:   Patient Vitals for the past 8 hrs:   Temp Pulse Resp BP SpO2   06/10/17 1920 98.1 °F (36.7 °C) (!) 112 16 (!) 174/118 98 %          Thank you for allowing us to provide you with medical care today. We realize that you have many choices for your emergency care needs. Please choose us in the future for any continued health care needs. Alex Tidwell, [address] [place] [address] 20.   Office: [phone]            Recent Results (from the past 24 hour(s))   URINALYSIS W/ REFLEX CULTURE    Collection Time: 06/10/17  7:48 PM   Result Value Ref Range    Color YELLOW/STRAW      Appearance CLOUDY (A) CLEAR      Specific gravity 1.025 1.003 - 1.030      pH (UA) 6.0 5.0 - 8.0      Protein TRACE (A) NEG mg/dL    Glucose NEGATIVE  NEG mg/dL    Ketone NEGATIVE  NEG mg/dL    Blood NEGATIVE  NEG      Urobilinogen 0.2 0.2 - 1.0 EU/dL    Nitrites NEGATIVE  NEG      Leukocyte Esterase NEGATIVE  NEG      WBC 0-4 0 - 4 /hpf    RBC 0-5 0 - 5 /hpf    Epithelial cells MANY (A) FEW /lpf    Bacteria 3+ (A) NEG /hpf    UA:UC IF INDICATED URINE CULTURE ORDERED (A) CNI      Mucus 4+ (A) NEG /lpf   BILIRUBIN, CONFIRM    Collection Time: 06/10/17  7:48 PM   Result Value Ref Range    Bilirubin UA, confirm NEGATIVE  NEG         No results found.

## 2017-06-11 NOTE — ED NOTES
The patient was discharged home by Dr. Philip Prather in stable condition. The patient is alert and oriented, in no respiratory distress and discharge vital signs obtained. The patient's diagnosis, condition and treatment were explained. The patient expressed understanding. One prescription given. No work/school note given. A discharge plan has been developed. A  was not involved in the process. Aftercare instructions were given. Pt ambulatory out of the ED.

## 2017-06-12 LAB
BACTERIA SPEC CULT: NORMAL
CC UR VC: NORMAL
SERVICE CMNT-IMP: NORMAL

## 2017-06-19 RX ORDER — ALBUTEROL SULFATE 90 UG/1
AEROSOL, METERED RESPIRATORY (INHALATION)
Qty: 18 INHALER | Refills: 1 | Status: SHIPPED | OUTPATIENT
Start: 2017-06-19 | End: 2017-08-14 | Stop reason: SDUPTHER

## 2017-06-20 DIAGNOSIS — F41.9 ANXIETY: ICD-10-CM

## 2017-06-20 RX ORDER — CLONAZEPAM 1 MG/1
1 TABLET ORAL
Qty: 30 TAB | Refills: 0 | OUTPATIENT
Start: 2017-06-20 | End: 2017-07-14 | Stop reason: SDUPTHER

## 2017-06-20 NOTE — TELEPHONE ENCOUNTER
Please advise pt if this can be refilled. CB# [phone]. Please leave a detailed message if pt does not answer because she is at work.

## 2017-07-14 DIAGNOSIS — F41.9 ANXIETY: ICD-10-CM

## 2017-07-14 RX ORDER — CLONAZEPAM 1 MG/1
1 TABLET ORAL
Qty: 30 TAB | Refills: 0 | OUTPATIENT
Start: 2017-07-14 | End: 2017-08-14 | Stop reason: SDUPTHER

## 2017-07-14 NOTE — TELEPHONE ENCOUNTER
Received: Yesterday       Sully LEDEZMA [facility] Front Office Pool                     Pt is requesting refill for Clozapine to be called into pharm on file. Pt will be on vacation for 2 weeks out of town.  Best contact [phone].

## 2017-08-14 ENCOUNTER — OFFICE VISIT (OUTPATIENT)
Dept: FAMILY MEDICINE CLINIC | Age: 37
End: 2017-08-14

## 2017-08-14 VITALS
DIASTOLIC BLOOD PRESSURE: 80 MMHG | TEMPERATURE: 98.5 F | HEIGHT: 67 IN | SYSTOLIC BLOOD PRESSURE: 141 MMHG | RESPIRATION RATE: 18 BRPM | BODY MASS INDEX: 45.99 KG/M2 | OXYGEN SATURATION: 98 % | WEIGHT: 293 LBS | HEART RATE: 77 BPM

## 2017-08-14 DIAGNOSIS — M54.50 LOW BACK PAIN WITHOUT SCIATICA, UNSPECIFIED BACK PAIN LATERALITY, UNSPECIFIED CHRONICITY: Primary | ICD-10-CM

## 2017-08-14 DIAGNOSIS — I10 ESSENTIAL HYPERTENSION WITH GOAL BLOOD PRESSURE LESS THAN 140/90: ICD-10-CM

## 2017-08-14 DIAGNOSIS — F41.9 ANXIETY: ICD-10-CM

## 2017-08-14 DIAGNOSIS — J45.20 MILD INTERMITTENT ASTHMA WITHOUT COMPLICATION: ICD-10-CM

## 2017-08-14 RX ORDER — ALBUTEROL SULFATE 90 UG/1
AEROSOL, METERED RESPIRATORY (INHALATION)
Qty: 18 INHALER | Refills: 1 | Status: SHIPPED | OUTPATIENT
Start: 2017-08-14 | End: 2017-10-26 | Stop reason: SDUPTHER

## 2017-08-14 RX ORDER — METHOCARBAMOL 750 MG/1
750 TABLET, FILM COATED ORAL 4 TIMES DAILY
Qty: 40 TAB | Refills: 0 | Status: SHIPPED | OUTPATIENT
Start: 2017-08-14 | End: 2017-08-24

## 2017-08-14 RX ORDER — CLONAZEPAM 1 MG/1
1 TABLET ORAL
Qty: 30 TAB | Refills: 0 | Status: SHIPPED | OUTPATIENT
Start: 2017-08-14 | End: 2017-09-12 | Stop reason: SDUPTHER

## 2017-08-14 RX ORDER — DICLOFENAC SODIUM 75 MG/1
75 TABLET, DELAYED RELEASE ORAL 2 TIMES DAILY
Qty: 60 TAB | Refills: 2 | Status: SHIPPED | OUTPATIENT
Start: 2017-08-14 | End: 2017-08-28

## 2017-08-14 NOTE — PROGRESS NOTES
Patient here for headache, everyday, from am until bedtime. 5/10. Patient also c/o chronic back pain, but last two weeks, pain has gotten worse. 10/10. She would like referral for pain management. 1. Have you been to the ER, urgent care clinic since your last visit? Hospitalized since your last visit? No    2. Have you seen or consulted any other health care providers outside of the [address] [place] [address] since your last visit? Include any pap smears or colon screening. No     Has hx of Disc issues in low back, no leg sxs    Has had PT in the past      Chief Complaint   Patient presents with    Headache     headaches, nausea, vision blurred 5/10/2017 started this am. Gets one everyday. Goes away when she sleeps.  Back Pain     10/10. back pain started 2 years ago, but last 2 weeks, feeling worse. she is a 44y.o. year old female who presents for evalution. Reviewed PmHx, RxHx, FmHx, SocHx, AllgHx and updated and dated in the chart. Patient Active Problem List    Diagnosis    Prediabetes    Essential hypertension with goal blood pressure less than 140/90    Asthma, mild intermittent    Anxiety       Review of Systems - negative except as listed above in the HPI    Objective:     Vitals:    08/14/17 1603   BP: 141/80   Pulse: 77   Resp: 18   Temp: 98.5 °F (36.9 °C)   SpO2: 98%   Weight: 322 lb (146.1 kg)   Height: 5' 7\" (1.702 m)     Physical Examination: General appearance - alert, well appearing, and in no distress  Back exam - full range of motion, no tenderness, palpable spasm or pain on motion      Assessment/ Plan:   Diagnoses and all orders for this visit:    1. Low back pain without sciatica, unspecified back pain laterality, unspecified chronicity  -     diclofenac EC (VOLTAREN) 75 mg EC tablet; Take 1 Tab by mouth two (2) times a day for 14 days. -     methocarbamol (ROBAXIN) 750 mg tablet;  Take 1 Tab by mouth four (4) times daily for 10 days.  -add rx  -dwp need to lose wt    2. Anxiety  -     clonazePAM (KLONOPIN) 1 mg tablet; Take 1 Tab by mouth daily as needed. - must last 30 days    3. Mild intermittent asthma without complication  -     albuterol (VENTOLIN HFA) 90 mcg/actuation inhaler; TAKE 2 PUFFS EVERY 4 HOURS AS NEEDED FOR WHEEZING    4. Essential hypertension with goal blood pressure less than 140/90  - inc with pain       Follow-up Disposition:  Return if symptoms worsen or fail to improve. I have discussed the diagnosis with the patient and the intended plan as seen in the above orders. The patient understands and agrees with the plan. The patient has received an after-visit summary and questions were answered concerning future plans. Medication Side Effects and Warnings were discussed with patient  Patient Labs were reviewed and or requested:  Patient Past Records were reviewed and or requested    Meghna Sheridan M.D. There are no Patient Instructions on file for this visit.

## 2017-08-14 NOTE — MR AVS SNAPSHOT
Visit Information Date & Time Provider Department Dept. Phone Encounter #  
 8/14/2017  3:00 PM Kayy Man MD [address] [facility] [phone] [id number] Follow-up Instructions Return if symptoms worsen or fail to improve. Upcoming Health Maintenance Date Due INFLUENZA AGE 9 TO ADULT 8/1/2017 PAP AKA CERVICAL CYTOLOGY 3/31/2020 DTaP/Tdap/Td series (2 - Td) 3/31/2027 Allergies as of 8/14/2017  Review Complete On: 8/14/2017 By: Kayy Man MD  
  
 Severity Noted Reaction Type Reactions Bactrim [Sulfamethoprim Ds]  12/16/2013    Hives Current Immunizations  Reviewed on 1/21/2016 No immunizations on file. Not reviewed this visit You Were Diagnosed With   
  
 Codes Comments Low back pain without sciatica, unspecified back pain laterality, unspecified chronicity    -  Primary ICD-10-CM: M54.5 ICD-9-CM: 724.2 Anxiety     ICD-10-CM: F41.9 ICD-9-CM: 300.00 Mild intermittent asthma without complication     WFU-50-LA: J45.20 ICD-9-CM: 493.90 Essential hypertension with goal blood pressure less than 140/90     ICD-10-CM: I10 
ICD-9-CM: 401.9 Vitals BP Pulse Temp Resp Height(growth percentile) Weight(growth percentile) 141/80 77 98.5 °F (36.9 °C) 18 5' 7\" (1.702 m) 322 lb (146.1 kg) SpO2 BMI OB Status Smoking Status 98% 50.43 kg/m2 Having regular periods Former Smoker Vitals History BMI and BSA Data Body Mass Index Body Surface Area 50.43 kg/m 2 2.63 m 2 Preferred Pharmacy Pharmacy Name Phone [id number], [address] [place] [phone] Your Updated Medication List  
  
   
This list is accurate as of: 8/14/17  4:18 PM.  Always use your most recent med list.  
  
  
  
  
 albuterol 90 mcg/actuation inhaler Commonly known as:  VENTOLIN HFA  
TAKE 2 PUFFS EVERY 4 HOURS AS NEEDED FOR WHEEZING  
  
 amLODIPine 5 mg tablet Commonly known as:  Caralee Dupes Take 1 Tab by mouth daily. ammonium lactate 12 % topical cream  
Commonly known as:  LAC-HYDRIN Apply  to affected area two (2) times a day. rub in to affected area well  
  
 beclomethasone 80 mcg/actuation Aero Commonly known as:  QVAR Take 2 Puffs by inhalation two (2) times a day. clonazePAM 1 mg tablet Commonly known as:  Erven Level Take 1 Tab by mouth daily as needed. - must last 30 days  
  
 cromolyn 4 % ophthalmic solution Commonly known as:  OPTICROM Administer 1 Drop to both eyes four (4) times daily. Use in affected eye(s)  
  
 diclofenac EC 75 mg EC tablet Commonly known as:  VOLTAREN Take 1 Tab by mouth two (2) times a day for 14 days. levocetirizine 5 mg tablet Commonly known as:  Nuzhat Hurl Take 1 Tab by mouth daily. methocarbamol 750 mg tablet Commonly known as:  ROBAXIN Take 1 Tab by mouth four (4) times daily for 10 days. Prescriptions Printed Refills  
 clonazePAM (KLONOPIN) 1 mg tablet 0 Sig: Take 1 Tab by mouth daily as needed. - must last 30 days Class: Print Route: Oral  
  
Prescriptions Sent to Pharmacy Refills  
 albuterol (VENTOLIN HFA) 90 mcg/actuation inhaler 1 Sig: TAKE 2 PUFFS EVERY 4 HOURS AS NEEDED FOR WHEEZING Class: Normal  
 Pharm[id number] Ph #: [phone]  
 diclofenac EC (VOLTAREN) 75 mg EC tablet 2 Sig: Take 1 Tab by mouth two (2) times a day for 14 days. Class: Normal  
 Pharmacy: [facility], [place] [address] Ph #: [phone] Route: Oral  
 methocarbamol (ROBAXIN) 750 mg tablet 0 Sig: Take 1 Tab by mouth four (4) times daily for 10 days. Class: Normal  
 Pharmacy: [facility], [place] [address] Ph #: [phone] Route: Oral  
  
Follow-up Instructions Return if symptoms worsen or fail to improve. Introducing [facility]! [place] [employer] introduces [employer] patient portal. Now you can access parts of your medical record, email your doctor's office, and request medication refills online. 1. In your internet browser, go to [email or web address] 2. Click on the First Time User? Click Here link in the Sign In box. You will see the New Member Sign Up page. 3. Enter your [employer] Access Code exactly as it appears below. You will not need to use this code after youve completed the sign-up process. If you do not sign up before the expiration date, you must request a new code. · [employer] Access Code: [email or web address] Expires: 11/12/2017  4:18 PM 
 
4. Enter the last four digits of your Social Security Number (xxxx) and Date of Birth (mm/dd/yyyy) as indicated and click Submit. You will be taken to the next sign-up page. 5. Create a [employer] ID. This will be your [employer] login ID and cannot be changed, so think of one that is secure and easy to remember. 6. Create a [employer] password. You can change your password at any time. 7. Enter your Password Reset Question and Answer. This can be used at a later time if you forget your password. 8. Enter your e-mail address. You will receive e-mail notification when new information is available in [address] 9. Click Sign Up. You can now view and download portions of your medical record. 10. Click the Download Summary menu link to download a portable copy of your medical information. If you have questions, please visit the Frequently Asked Questions section of the [employer] website. Remember, [employer] is NOT to be used for urgent needs. For medical emergencies, dial 911. Now available from your iPhone and Android! Please provide this summary of care documentation to your next provider. Your primary care clinician is listed as Anita Chavez. If you have any questions after today's visit, please call [phone].

## 2017-09-08 ENCOUNTER — OFFICE VISIT (OUTPATIENT)
Dept: FAMILY MEDICINE CLINIC | Age: 37
End: 2017-09-08

## 2017-09-08 ENCOUNTER — TELEPHONE (OUTPATIENT)
Dept: FAMILY MEDICINE CLINIC | Age: 37
End: 2017-09-08

## 2017-09-08 VITALS
HEIGHT: 67 IN | SYSTOLIC BLOOD PRESSURE: 140 MMHG | TEMPERATURE: 98.7 F | WEIGHT: 293 LBS | OXYGEN SATURATION: 98 % | RESPIRATION RATE: 18 BRPM | HEART RATE: 94 BPM | DIASTOLIC BLOOD PRESSURE: 91 MMHG | BODY MASS INDEX: 45.99 KG/M2

## 2017-09-08 DIAGNOSIS — R11.2 NAUSEA AND VOMITING, INTRACTABILITY OF VOMITING NOT SPECIFIED, UNSPECIFIED VOMITING TYPE: ICD-10-CM

## 2017-09-08 DIAGNOSIS — R19.7 DIARRHEA, UNSPECIFIED TYPE: ICD-10-CM

## 2017-09-08 DIAGNOSIS — R29.898 OBTURATOR SIGN: ICD-10-CM

## 2017-09-08 DIAGNOSIS — R73.03 PREDIABETES: ICD-10-CM

## 2017-09-08 DIAGNOSIS — R10.31 RLQ ABDOMINAL PAIN: Primary | ICD-10-CM

## 2017-09-08 DIAGNOSIS — M54.50 CHRONIC MIDLINE LOW BACK PAIN WITHOUT SCIATICA: ICD-10-CM

## 2017-09-08 DIAGNOSIS — G89.29 CHRONIC MIDLINE LOW BACK PAIN WITHOUT SCIATICA: ICD-10-CM

## 2017-09-08 NOTE — LETTER
NOTIFICATION RETURN TO WORK / SCHOOL 
 
9/8/2017 10:45 AM 
 
Ms. Whitley Micki [address] [id number] To Whom It May Concern: 
 
Zaire Mohan is currently under the care of [address]. She will return to work/school on: 09/11/2017 If there are questions or concerns please have the patient contact our office.  
 
 
 
Sincerely, 
 
 
Brenda Sher NP

## 2017-09-08 NOTE — PROGRESS NOTES
Chief Complaint   Patient presents with    Back Pain    Nausea    Vomiting    Diarrhea     1. Have you been to the ER, urgent care clinic since your last visit? Hospitalized since your last visit? No    2. Have you seen or consulted any other health care providers outside of the [address] [place] [address] since your last visit? Include any pap smears or colon screening. No    Patient in office today for GI sx that began Monday; have been treating with otc anti-diarrhea, and tylenol. Back pain began one month ago; states pain has not improved since lov; states medication that was prescribed did not help with pain. Pt c/o headaches, stomach pain, nausea, and diarrhea. Reports pain in the bottom of the stomach. Denies any sick contacts. Denies any fever. Denies any new foods. Last vomited yesterday around 4 pm. Has eaten since then and has been able to keep the food down. Last diarrhea was this morning. Liquid. Malodorous. Denies any color to the diarrhea. Reports chronic low back pain that is not improved compared to last OV. Pt has previously been to see ortho in the past. Was told that she had a disc problem. Last imaging was about 5-6 years ago. Denies any history of trauma or injury. Pain occurs daily and radiates into the hips and tops of her legs. Denies any OTCs. Medications prescribed by Dr. Heredia Ours didn't seem to help. Denies any heat ice or exercises. Saw OBGYn about 2.5 weeks ago. Was prescribed an abx for UTI. Finished medication about a week ago. Was having problems with urinary frequency. Feels like she has to void every 15 minutes. Last a1c in January was abnormal. Did not tolerate the medication so she stopped taking. Has not had her a1c checked since then. Reports pain with urination. Denies any vaginal discharge. Denies any other concerns at this time.      Chief Complaint   Patient presents with    Back Pain    Nausea    Vomiting    Diarrhea she is a 44y.o. year old female who presents for evalution. Reviewed PmHx, RxHx, FmHx, SocHx, AllgHx and updated and dated in the chart. Review of Systems - negative except as listed above in the HPI    Objective:     Vitals:    09/08/17 1002   BP: (!) 140/91   Pulse: 94   Resp: 18   Temp: 98.7 °F (37.1 °C)   TempSrc: Oral   SpO2: 98%   Weight: 321 lb (145.6 kg)   Height: 5' 7\" (1.702 m)     Physical Examination: General appearance - alert, well appearing, and in no distress and overweight  Mental status - normal mood, behavior  Eyes - pupils equal and reactive, extraocular eye movements intact  Ears - bilateral TM's and external ear canals normal  Nose - normal and patent, no erythema, discharge or polyps and normal nontender sinuses  Mouth - mucous membranes moist, pharynx normal without lesions  Neck - supple, no significant adenopathy, carotids upstroke normal bilaterally, no bruits, thyroid exam: thyroid is normal in size without nodules or tenderness  Chest - clear to auscultation, no wheezes, rales or rhonchi, symmetric air entry  Heart - normal rate, regular rhythm, normal S1, S2, no murmurs,  Abdomen - tenderness noted in RLQ with deep palpation, positive rebound tenderness, positive psoas sign and obturators sign  bowel sounds normal  Back exam - pain with motion noted during exam, tenderness noted in middle of lumbar spine with no palpable abnormality, negative straight-leg raise bilaterally, patellar DTRs normal and symmetric  Extremities - peripheral pulses normal, no ankle edema, no clubbing or cyanosis  Skin - normal coloration and turgor, no rashes, no suspicious skin lesions noted    Assessment/ Plan:   Diagnoses and all orders for this visit:    1. RLQ abdominal pain / 2. Obturator sign / 3. Nausea and vomiting, intractability of vomiting not specified, unspecified vomiting type / 4.  Diarrhea, unspecified type  -     CT ABD PELV W WO CONT; Future  -     METABOLIC PANEL, COMPREHENSIVE  - CBC WITH AUTOMATED DIFF  -     TSH 3RD GENERATION  Will notify results and deviate plan based on findings. Discussed that acute abdominal pain is a medical emergency until proven otherwise. Advised to obtain stat CT asap. If unable to complete in a timely manner and sx persist or worsen advised pt to go to ED immediately for further evaluation. Pt verbalized understanding of this and is agreeable to plan. 5. Prediabetes  -     HEMOGLOBIN A1C WITH EAG  Discussed that a lot of her sx including urinary frequency could be due to worsening prediabetes, familia since she stopped taking metformin. Will notify results and deviate plan based on findings. 6. Chronic midline low back pain without sciatica  -     REFERRAL TO ORTHOPEDICS  Enc pt to [facility] care with Dr. Dilcia James for further evaluation and treatment. Follow-up Disposition:  Return in about 1 week (around 9/15/2017). I have discussed the diagnosis with the patient and the intended plan as seen in the above orders. The patient has received an after-visit summary and questions were answered concerning future plans. Medication Side Effects and Warnings were discussed with patient: yes  Patient Labs were reviewed and or requested: yes  Patient Past Records were reviewed and or requested  yes  Patient / Caregiver Understanding of treatment plan was verbalized during office visit YES    AUSTIN Soliman    Patient Instructions        Abdominal Pain: Care Instructions  Your Care Instructions    Abdominal pain has many possible causes. Some aren't serious and get better on their own in a few days. Others need more testing and treatment. If your pain continues or gets worse, you need to be rechecked and may need more tests to find out what is wrong. You may need surgery to correct the problem. Don't ignore new symptoms, such as fever, nausea and vomiting, urination problems, pain that gets worse, and dizziness.  These may be signs of a more serious problem. Your doctor may have recommended a follow-up visit in the next 8 to 12 hours. If you are not getting better, you may need more tests or treatment. The doctor has checked you carefully, but problems can develop later. If you notice any problems or new symptoms, get medical treatment right away. Follow-up care is a key part of your treatment and safety. Be sure to make and go to all appointments, and call your doctor if you are having problems. It's also a good idea to know your test results and keep a list of the medicines you take. How can you care for yourself at home? · Rest until you feel better. · To prevent dehydration, drink plenty of fluids, enough so that your urine is light yellow or clear like water. Choose water and other caffeine-free clear liquids until you feel better. If you have kidney, heart, or liver disease and have to limit fluids, talk with your doctor before you increase the amount of fluids you drink. · If your stomach is upset, eat mild foods, such as rice, dry toast or crackers, bananas, and applesauce. Try eating several small meals instead of two or three large ones. · Wait until 48 hours after all symptoms have gone away before you have spicy foods, alcohol, and drinks that contain caffeine. · Do not eat foods that are high in fat. · Avoid anti-inflammatory medicines such as aspirin, ibuprofen (Advil, Motrin), and naproxen (Aleve). These can cause stomach upset. Talk to your doctor if you take daily aspirin for another health problem. When should you call for help? Call 911 anytime you think you may need emergency care. For example, call if:  · You passed out (lost consciousness). · You pass maroon or very bloody stools. · You vomit blood or what looks like coffee grounds. · You have new, severe belly pain. Call your doctor now or seek immediate medical care if:  · Your pain gets worse, especially if it becomes focused in one area of your belly.   · You have a new or higher fever. · Your stools are black and look like tar, or they have streaks of blood. · You have unexpected vaginal bleeding. · You have symptoms of a urinary tract infection. These may include:  ¨ Pain when you urinate. ¨ Urinating more often than usual.  ¨ Blood in your urine. · You are dizzy or lightheaded, or you feel like you may faint. Watch closely for changes in your health, and be sure to contact your doctor if:  · You are not getting better after 1 day (24 hours). Where can you learn more? Go to http://patricia-nan.info/. Enter Z970 in the search box to learn more about \"Abdominal Pain: Care Instructions. \"  Current as of: March 20, 2017  Content Version: 11.3  © 8339-9872 [employer]. Care instructions adapted under license by [employer] (which disclaims liability or warranty for this information). If you have questions about a medical condition or this instruction, always ask your healthcare professional. [address] any warranty or liability for your use of this information.

## 2017-09-08 NOTE — TELEPHONE ENCOUNTER
[facility] and states that they received the stat order for the CT of abdomen and pelvis but insurance will not approve they state that the doctor office needs to call them back at [phone] option 7 pre-auth dept and give clinical information to approve this for today @ 3:30 pm. Please call her back at [phone] with approval or denial because they have pt on schedule for today.

## 2017-09-08 NOTE — PROGRESS NOTES
Chief Complaint   Patient presents with    Back Pain    Nausea    Vomiting    Diarrhea     Patient in office today for gi sx that began Monday; have been treating with otc anti-diarrhea,and tylenol. Back pain began one month ago; states pain has not improved since lov; states medication that was prescribed did not help with pain. 1. Have you been to the ER, urgent care clinic since your last visit? Hospitalized since your last visit? No    2. Have you seen or consulted any other health care providers outside of the [address] [place] [address] since your last visit? Include any pap smears or colon screening.  No

## 2017-09-08 NOTE — TELEPHONE ENCOUNTER
CT approved for 90 days. Authorization code: [id number]. Attempted to notify coordinator but had to leave voicemail. Left VM with detailed info regarding auth code and that this can be found in telephone encounter in [facility]. Can you please try calling in a few minutes to give her this information? Thank you!

## 2017-09-08 NOTE — PATIENT INSTRUCTIONS

## 2017-09-08 NOTE — MR AVS SNAPSHOT
Visit Information Date & Time Provider Department Dept. Phone Encounter #  
 9/8/2017  9:45 AM Angela Goodwin NP [address] [facility] [phone] [id number] Follow-up Instructions Return in about 1 week (around 9/15/2017). Upcoming Health Maintenance Date Due INFLUENZA AGE 9 TO ADULT 8/1/2017 PAP AKA CERVICAL CYTOLOGY 3/31/2020 DTaP/Tdap/Td series (2 - Td) 3/31/2027 Allergies as of 9/8/2017  Review Complete On: 9/8/2017 By: Angela Goodwin NP Severity Noted Reaction Type Reactions Bactrim [Sulfamethoprim Ds]  12/16/2013    Hives Current Immunizations  Reviewed on 1/21/2016 No immunizations on file. Not reviewed this visit You Were Diagnosed With   
  
 Codes Comments RLQ abdominal pain    -  Primary ICD-10-CM: R10.31 ICD-9-CM: 789.03 Obturator sign     ICD-10-CM: R68.89 ICD-9-CM: 796.4 Nausea and vomiting, intractability of vomiting not specified, unspecified vomiting type     ICD-10-CM: R11.2 ICD-9-CM: 787.01 Diarrhea, unspecified type     ICD-10-CM: R19.7 ICD-9-CM: 787.91 Prediabetes     ICD-10-CM: R73.03 
ICD-9-CM: 790.29 Chronic midline low back pain without sciatica     ICD-10-CM: M54.5, G89.29 ICD-9-CM: 724.2, 338.29 Vitals BP Pulse Temp Resp Height(growth percentile) Weight(growth percentile) (!) 140/91 (BP 1 Location: Right arm, BP Patient Position: Sitting) 94 98.7 °F (37.1 °C) (Oral) 18 5' 7\" (1.702 m) 321 lb (145.6 kg) LMP SpO2 BMI OB Status Smoking Status 08/20/2017 98% 50.28 kg/m2 Having regular periods Former Smoker Vitals History BMI and BSA Data Body Mass Index Body Surface Area  
 50.28 kg/m 2 2.62 m 2 Preferred Pharmacy Pharmacy Name Phone [id number], [address] [facility] [phone] Your Updated Medication List  
  
   
This list is accurate as of: 9/8/17 10:41 AM.  Always use your most recent med list.  
 albuterol 90 mcg/actuation inhaler Commonly known as:  VENTOLIN HFA  
TAKE 2 PUFFS EVERY 4 HOURS AS NEEDED FOR WHEEZING  
  
 amLODIPine 5 mg tablet Commonly known as:  Haig [place] Take 1 Tab by mouth daily. ammonium lactate 12 % topical cream  
Commonly known as:  LAC-HYDRIN Apply  to affected area two (2) times a day. rub in to affected area well  
  
 clonazePAM 1 mg tablet Commonly known as:  Christina Mitchellen Take 1 Tab by mouth daily as needed. - must last 30 days  
  
 levocetirizine 5 mg tablet Commonly known as:  Ant Kitten Take 1 Tab by mouth daily. We Performed the Following CBC WITH AUTOMATED DIFF [44907 CPT(R)] HEMOGLOBIN A1C WITH EAG [68113 CPT(R)] METABOLIC PANEL, COMPREHENSIVE [51823 CPT(R)] REFERRAL TO ORTHOPEDICS [GLK133 Custom] TSH 3RD GENERATION [74718 CPT(R)] Follow-up Instructions Return in about 1 week (around 9/15/2017). To-Do List   
 09/08/2017 Imaging:  CT ABD PELV W WO CONT Referral Information Referral ID Referred By Referred To  
  
 [id number] Catarino ORTEGA Not Available Visits Status Start Date End Date 1 New Request 9/8/17 9/8/18 If your referral has a status of pending review or denied, additional information will be sent to support the outcome of this decision. Referral ID Referred By Referred To  
 [id number] Fredi Cross MD  
   [address] Phone: [phone] Fax: [phone] Visits Status Start Date End Date 1 New Request 9/8/17 9/8/18 If your referral has a status of pending review or denied, additional information will be sent to support the outcome of this decision. Patient Instructions Abdominal Pain: Care Instructions Your Care Instructions Abdominal pain has many possible causes.  Some aren't serious and get better on their own in a few days. Others need more testing and treatment. If your pain continues or gets worse, you need to be rechecked and may need more tests to find out what is wrong. You may need surgery to correct the problem. Don't ignore new symptoms, such as fever, nausea and vomiting, urination problems, pain that gets worse, and dizziness. These may be signs of a more serious problem. Your doctor may have recommended a follow-up visit in the next 8 to 12 hours. If you are not getting better, you may need more tests or treatment. The doctor has checked you carefully, but problems can develop later. If you notice any problems or new symptoms, get medical treatment right away. Follow-up care is a key part of your treatment and safety. Be sure to make and go to all appointments, and call your doctor if you are having problems. It's also a good idea to know your test results and keep a list of the medicines you take. How can you care for yourself at home? · Rest until you feel better. · To prevent dehydration, drink plenty of fluids, enough so that your urine is light yellow or clear like water. Choose water and other caffeine-free clear liquids until you feel better. If you have kidney, heart, or liver disease and have to limit fluids, talk with your doctor before you increase the amount of fluids you drink. · If your stomach is upset, eat mild foods, such as rice, dry toast or crackers, bananas, and applesauce. Try eating several small meals instead of two or three large ones. · Wait until 48 hours after all symptoms have gone away before you have spicy foods, alcohol, and drinks that contain caffeine. · Do not eat foods that are high in fat. · Avoid anti-inflammatory medicines such as aspirin, ibuprofen (Advil, Motrin), and naproxen (Aleve). These can cause stomach upset. Talk to your doctor if you take daily aspirin for another health problem. When should you call for help? Call 911 anytime you think you may need emergency care. For example, call if: 
· You passed out (lost consciousness). · You pass maroon or very bloody stools. · You vomit blood or what looks like coffee grounds. · You have new, severe belly pain. Call your doctor now or seek immediate medical care if: 
· Your pain gets worse, especially if it becomes focused in one area of your belly. · You have a new or higher fever. · Your stools are black and look like tar, or they have streaks of blood. · You have unexpected vaginal bleeding. · You have symptoms of a urinary tract infection. These may include: 
¨ Pain when you urinate. ¨ Urinating more often than usual. 
¨ Blood in your urine. · You are dizzy or lightheaded, or you feel like you may faint. Watch closely for changes in your health, and be sure to contact your doctor if: 
· You are not getting better after 1 day (24 hours). Where can you learn more? Go to http://patricia-nan.info/. Enter B290 in the search box to learn more about \"Abdominal Pain: Care Instructions. \" Current as of: March 20, 2017 Content Version: 11.3 © 9934-3268 [employer]. Care instructions adapted under license by [employer] (which disclaims liability or warranty for this information). If you have questions about a medical condition or this instruction, always ask your healthcare professional. [address] any warranty or liability for your use of this information. Introducing [facility]! Roopa Mars introduces LocoX.com patient portal. Now you can access parts of your medical record, email your doctor's office, and request medication refills online. 1. In your internet browser, go to [email or web address] 2. Click on the First Time User? Click Here link in the Sign In box. You will see the New Member Sign Up page. 3. Enter your Tutee Access Code exactly as it appears below. You will not need to use this code after youve completed the sign-up process. If you do not sign up before the expiration date, you must request a new code. · Tutee Access Code: [email or web address] Expires: 11/12/2017  4:18 PM 
 
4. Enter the last four digits of your Social Security Number (xxxx) and Date of Birth (mm/dd/yyyy) as indicated and click Submit. You will be taken to the next sign-up page. 5. Create a Tutee ID. This will be your Tutee login ID and cannot be changed, so think of one that is secure and easy to remember. 6. Create a Tutee password. You can change your password at any time. 7. Enter your Password Reset Question and Answer. This can be used at a later time if you forget your password. 8. Enter your e-mail address. You will receive e-mail notification when new information is available in [address]. 9. Click Sign Up. You can now view and download portions of your medical record. 10. Click the Download Summary menu link to download a portable copy of your medical information. If you have questions, please visit the Frequently Asked Questions section of the Tutee website. Remember, Tutee is NOT to be used for urgent needs. For medical emergencies, dial 911. Now available from your iPhone and Android! Please provide this summary of care documentation to your next provider. Your primary care clinician is listed as Roxana Bergeron. If you have any questions after today's visit, please call [phone].

## 2017-09-09 LAB
ALBUMIN SERPL-MCNC: 4.3 G/DL (ref 3.5–5.5)
ALBUMIN/GLOB SERPL: 1.4 {RATIO} (ref 1.2–2.2)
ALP SERPL-CCNC: 70 IU/L (ref 39–117)
ALT SERPL-CCNC: 12 IU/L (ref 0–32)
AST SERPL-CCNC: 12 IU/L (ref 0–40)
BASOPHILS # BLD AUTO: 0 X10E3/UL (ref 0–0.2)
BASOPHILS NFR BLD AUTO: 0 %
BILIRUB SERPL-MCNC: 0.4 MG/DL (ref 0–1.2)
BUN SERPL-MCNC: 7 MG/DL (ref 6–20)
BUN/CREAT SERPL: 12 (ref 9–23)
CALCIUM SERPL-MCNC: 9.2 MG/DL (ref 8.7–10.2)
CHLORIDE SERPL-SCNC: 100 MMOL/L (ref 96–106)
CO2 SERPL-SCNC: 26 MMOL/L (ref 18–29)
CREAT SERPL-MCNC: 0.58 MG/DL (ref 0.57–1)
EOSINOPHIL # BLD AUTO: 0.2 X10E3/UL (ref 0–0.4)
EOSINOPHIL NFR BLD AUTO: 2 %
ERYTHROCYTE [DISTWIDTH] IN BLOOD BY AUTOMATED COUNT: 18.4 % (ref 12.3–15.4)
EST. AVERAGE GLUCOSE BLD GHB EST-MCNC: 120 MG/DL
GLOBULIN SER CALC-MCNC: 3.1 G/DL (ref 1.5–4.5)
GLUCOSE SERPL-MCNC: 100 MG/DL (ref 65–99)
HBA1C MFR BLD: 5.8 % (ref 4.8–5.6)
HCT VFR BLD AUTO: 35.6 % (ref 34–46.6)
HGB BLD-MCNC: 10.6 G/DL (ref 11.1–15.9)
IMM GRANULOCYTES # BLD: 0 X10E3/UL (ref 0–0.1)
IMM GRANULOCYTES NFR BLD: 0 %
LYMPHOCYTES # BLD AUTO: 2.8 X10E3/UL (ref 0.7–3.1)
LYMPHOCYTES NFR BLD AUTO: 25 %
MCH RBC QN AUTO: 23.5 PG (ref 26.6–33)
MCHC RBC AUTO-ENTMCNC: 29.8 G/DL (ref 31.5–35.7)
MCV RBC AUTO: 79 FL (ref 79–97)
MONOCYTES # BLD AUTO: 0.6 X10E3/UL (ref 0.1–0.9)
MONOCYTES NFR BLD AUTO: 5 %
NEUTROPHILS # BLD AUTO: 7.8 X10E3/UL (ref 1.4–7)
NEUTROPHILS NFR BLD AUTO: 68 %
PLATELET # BLD AUTO: 433 X10E3/UL (ref 150–379)
POTASSIUM SERPL-SCNC: 4.1 MMOL/L (ref 3.5–5.2)
PROT SERPL-MCNC: 7.4 G/DL (ref 6–8.5)
RBC # BLD AUTO: 4.52 X10E6/UL (ref 3.77–5.28)
SODIUM SERPL-SCNC: 141 MMOL/L (ref 134–144)
TSH SERPL DL<=0.005 MIU/L-ACNC: 3.21 UIU/ML (ref 0.45–4.5)
WBC # BLD AUTO: 11.6 X10E3/UL (ref 3.4–10.8)

## 2017-09-11 DIAGNOSIS — F41.9 ANXIETY: ICD-10-CM

## 2017-09-11 NOTE — TELEPHONE ENCOUNTER
Pt has mild anemia and blood sugars are in the prediabetic range. No acute findings. Has Ct been scheduled?

## 2017-09-11 NOTE — TELEPHONE ENCOUNTER
Patient checking on status of lab results. She is nauseated & her frequent urination is worse.  Please call,her today @[phone]

## 2017-09-12 RX ORDER — CLONAZEPAM 1 MG/1
1 TABLET ORAL
Qty: 30 TAB | Refills: 2 | OUTPATIENT
Start: 2017-09-12 | End: 2017-11-07 | Stop reason: SDUPTHER

## 2017-09-12 RX ORDER — CLONAZEPAM 1 MG/1
1 TABLET ORAL
Qty: 30 TAB | Refills: 0 | OUTPATIENT
Start: 2017-09-12

## 2017-09-12 NOTE — TELEPHONE ENCOUNTER
PT id x 3. RC and was notified as per [employer]. Unable to see why refill request was refused unless it is due to refill too soon. Pt would like to know if it can be refilled on 9/14/17.

## 2017-09-12 NOTE — PROGRESS NOTES
Patient id x 3, notified and verbalized understanding. States that she went to [facility] and did not request to have results faxed.

## 2017-09-12 NOTE — PROGRESS NOTES
Where did pt have CT done? Did she ask them to fax us a report? At this point I recommend pt consider seeing a GI specialist or following up with her OBGYN for persistent symptoms. If she wants to see GI I recommend Dr. Gianna Correa at [facility].

## 2017-10-03 ENCOUNTER — HOSPITAL ENCOUNTER (EMERGENCY)
Age: 37
Discharge: HOME | End: 2017-10-03
Attending: EMERGENCY MEDICINE
Payer: SELF-PAY

## 2017-10-03 VITALS
TEMPERATURE: 98.3 F | HEART RATE: 76 BPM | OXYGEN SATURATION: 97 % | DIASTOLIC BLOOD PRESSURE: 77 MMHG | WEIGHT: 293 LBS | BODY MASS INDEX: 44.41 KG/M2 | RESPIRATION RATE: 16 BRPM | HEIGHT: 68 IN | SYSTOLIC BLOOD PRESSURE: 145 MMHG

## 2017-10-03 DIAGNOSIS — R30.0 DYSURIA: ICD-10-CM

## 2017-10-03 DIAGNOSIS — R10.84 ABDOMINAL PAIN, GENERALIZED: Primary | ICD-10-CM

## 2017-10-03 LAB
ALBUMIN SERPL-MCNC: 3.3 G/DL (ref 3.5–5)
ALBUMIN/GLOB SERPL: 0.7 {RATIO} (ref 1.1–2.2)
ALP SERPL-CCNC: 69 U/L (ref 45–117)
ALT SERPL-CCNC: 18 U/L (ref 12–78)
ANION GAP SERPL CALC-SCNC: 7 MMOL/L (ref 5–15)
APPEARANCE UR: CLEAR
AST SERPL-CCNC: 18 U/L (ref 15–37)
BACTERIA URNS QL MICRO: NEGATIVE /HPF
BASOPHILS # BLD: 0 K/UL (ref 0–0.1)
BASOPHILS NFR BLD: 0 % (ref 0–1)
BILIRUB SERPL-MCNC: 0.3 MG/DL (ref 0.2–1)
BILIRUB UR QL: NEGATIVE
BUN SERPL-MCNC: 8 MG/DL (ref 6–20)
BUN/CREAT SERPL: 12 (ref 12–20)
CALCIUM SERPL-MCNC: 8.4 MG/DL (ref 8.5–10.1)
CHLORIDE SERPL-SCNC: 104 MMOL/L (ref 97–108)
CO2 SERPL-SCNC: 29 MMOL/L (ref 21–32)
COLOR UR: NORMAL
CREAT SERPL-MCNC: 0.66 MG/DL (ref 0.55–1.02)
DIFFERENTIAL METHOD BLD: ABNORMAL
EOSINOPHIL # BLD: 0.2 K/UL (ref 0–0.4)
EOSINOPHIL NFR BLD: 2 % (ref 0–7)
EPITH CASTS URNS QL MICRO: NORMAL /LPF
ERYTHROCYTE [DISTWIDTH] IN BLOOD BY AUTOMATED COUNT: 18.2 % (ref 11.5–14.5)
GLOBULIN SER CALC-MCNC: 4.6 G/DL (ref 2–4)
GLUCOSE SERPL-MCNC: 87 MG/DL (ref 65–100)
GLUCOSE UR STRIP.AUTO-MCNC: NEGATIVE MG/DL
HCG UR QL: NEGATIVE
HCT VFR BLD AUTO: 34.5 % (ref 35–47)
HGB BLD-MCNC: 10.6 G/DL (ref 11.5–16)
HGB UR QL STRIP: NEGATIVE
KETONES UR QL STRIP.AUTO: NEGATIVE MG/DL
LEUKOCYTE ESTERASE UR QL STRIP.AUTO: NEGATIVE
LIPASE SERPL-CCNC: 106 U/L (ref 73–393)
LYMPHOCYTES # BLD: 3.4 K/UL (ref 0.8–3.5)
LYMPHOCYTES NFR BLD: 31 % (ref 12–49)
MCH RBC QN AUTO: 24.3 PG (ref 26–34)
MCHC RBC AUTO-ENTMCNC: 30.7 G/DL (ref 30–36.5)
MCV RBC AUTO: 79.1 FL (ref 80–99)
MONOCYTES # BLD: 0.8 K/UL (ref 0–1)
MONOCYTES NFR BLD: 8 % (ref 5–13)
NEUTS SEG # BLD: 6.3 K/UL (ref 1.8–8)
NEUTS SEG NFR BLD: 59 % (ref 32–75)
NITRITE UR QL STRIP.AUTO: NEGATIVE
PH UR STRIP: 7.5 [PH] (ref 5–8)
PLATELET # BLD AUTO: 413 K/UL (ref 150–400)
POTASSIUM SERPL-SCNC: 3.7 MMOL/L (ref 3.5–5.1)
PROT SERPL-MCNC: 7.9 G/DL (ref 6.4–8.2)
PROT UR STRIP-MCNC: NEGATIVE MG/DL
RBC # BLD AUTO: 4.36 M/UL (ref 3.8–5.2)
RBC #/AREA URNS HPF: NORMAL /HPF (ref 0–5)
SODIUM SERPL-SCNC: 140 MMOL/L (ref 136–145)
SP GR UR REFRACTOMETRY: 1.01 (ref 1–1.03)
UROBILINOGEN UR QL STRIP.AUTO: 0.2 EU/DL (ref 0.2–1)
WBC # BLD AUTO: 10.9 K/UL (ref 3.6–11)
WBC URNS QL MICRO: NORMAL /HPF (ref 0–4)

## 2017-10-03 PROCEDURE — 96375 TX/PRO/DX INJ NEW DRUG ADDON: CPT

## 2017-10-03 PROCEDURE — 80053 COMPREHEN METABOLIC PANEL: CPT | Performed by: EMERGENCY MEDICINE

## 2017-10-03 PROCEDURE — 74011250636 HC RX REV CODE- 250/636: Performed by: EMERGENCY MEDICINE

## 2017-10-03 PROCEDURE — 96374 THER/PROPH/DIAG INJ IV PUSH: CPT

## 2017-10-03 PROCEDURE — 83690 ASSAY OF LIPASE: CPT | Performed by: EMERGENCY MEDICINE

## 2017-10-03 PROCEDURE — 99283 EMERGENCY DEPT VISIT LOW MDM: CPT

## 2017-10-03 PROCEDURE — 36415 COLL VENOUS BLD VENIPUNCTURE: CPT | Performed by: EMERGENCY MEDICINE

## 2017-10-03 PROCEDURE — 96361 HYDRATE IV INFUSION ADD-ON: CPT

## 2017-10-03 PROCEDURE — 85025 COMPLETE CBC W/AUTO DIFF WBC: CPT | Performed by: EMERGENCY MEDICINE

## 2017-10-03 PROCEDURE — 81025 URINE PREGNANCY TEST: CPT

## 2017-10-03 PROCEDURE — 81001 URINALYSIS AUTO W/SCOPE: CPT | Performed by: EMERGENCY MEDICINE

## 2017-10-03 RX ORDER — ONDANSETRON 2 MG/ML
4 INJECTION INTRAMUSCULAR; INTRAVENOUS
Status: COMPLETED | OUTPATIENT
Start: 2017-10-03 | End: 2017-10-03

## 2017-10-03 RX ORDER — ONDANSETRON 4 MG/1
4 TABLET, ORALLY DISINTEGRATING ORAL
Qty: 10 TAB | Refills: 0 | Status: SHIPPED | OUTPATIENT
Start: 2017-10-03 | End: 2017-10-26 | Stop reason: DRUGHIGH

## 2017-10-03 RX ORDER — KETOROLAC TROMETHAMINE 30 MG/ML
30 INJECTION, SOLUTION INTRAMUSCULAR; INTRAVENOUS
Status: COMPLETED | OUTPATIENT
Start: 2017-10-03 | End: 2017-10-03

## 2017-10-03 RX ORDER — SODIUM CHLORIDE 0.9 % (FLUSH) 0.9 %
5-10 SYRINGE (ML) INJECTION AS NEEDED
Status: DISCONTINUED | OUTPATIENT
Start: 2017-10-03 | End: 2017-10-04 | Stop reason: HOSPADM

## 2017-10-03 RX ORDER — DICLOFENAC SODIUM 75 MG/1
75 TABLET, DELAYED RELEASE ORAL 2 TIMES DAILY
Qty: 30 TAB | Refills: 0 | Status: SHIPPED | OUTPATIENT
Start: 2017-10-03 | End: 2017-10-26

## 2017-10-03 RX ORDER — SODIUM CHLORIDE 0.9 % (FLUSH) 0.9 %
5-10 SYRINGE (ML) INJECTION EVERY 8 HOURS
Status: DISCONTINUED | OUTPATIENT
Start: 2017-10-03 | End: 2017-10-04 | Stop reason: HOSPADM

## 2017-10-03 RX ADMIN — ONDANSETRON 4 MG: 2 INJECTION INTRAMUSCULAR; INTRAVENOUS at 23:03

## 2017-10-03 RX ADMIN — KETOROLAC TROMETHAMINE 30 MG: 30 INJECTION, SOLUTION INTRAMUSCULAR at 23:04

## 2017-10-03 RX ADMIN — Medication 10 ML: at 23:04

## 2017-10-03 RX ADMIN — SODIUM CHLORIDE 1000 ML: 900 INJECTION, SOLUTION INTRAVENOUS at 23:02

## 2017-10-04 NOTE — ED NOTES
The patient was discharged home by Dr. Boaz Castellano and Alma Corbett rn in stable condition, accompanied by family. The patient is alert and oriented, is in no respiratory distress and has vital signs within normal limits . The patient's diagnosis, condition and treatment were explained to patient. The patient expressed understanding. Prescriptions given to pt. No work/school note given to pt. A discharge plan has been developed. A  was not involved in the process. Aftercare instructions were given to the patient. Pt's saline lock removed without complications. Family will transport pt home.

## 2017-10-04 NOTE — DISCHARGE INSTRUCTIONS
We hope that we have addressed all of your medical concerns. The examination and treatment you received in the Emergency Department were for an emergent problem and were not intended as complete care. It is important that you follow up with your healthcare provider(s) for ongoing care. If your symptoms worsen or do not improve as expected, and you are unable to reach your usual health care provider(s), you should return to the Emergency Department. Today's healthcare is undergoing tremendous change, and patient satisfaction surveys are one of the many tools to assess the quality of medical care. You may receive a survey from the Scale[employer] regarding your experience in the Emergency Department. I hope that your experience has been completely positive, particularly the medical care that I provided. As such, please participate in the survey; anything less than excellent does not meet my expectations or intentions. [facility] and [address] [place] [address] participate in nationally recognized quality of care measures. If your blood pressure is greater than 120/80, as reported below, we urge that you seek medical care to address the potential of high blood pressure, commonly known as hypertension. Hypertension can be hereditary or can be caused by certain medical conditions, pain, stress, or \"white coat syndrome. \"       Please make an appointment with your health care provider(s) for follow up of your Emergency Department visit. VITALS:   Patient Vitals for the past 8 hrs:   Temp Pulse Resp BP SpO2   10/03/17 2245 98.3 °F (36.8 °C) 90 16 198/88 96 %          Thank you for allowing us to provide you with medical care today. We realize that you have many choices for your emergency care needs. Please choose us in the future for any continued health care needs. Vito Clifford, [address]. Office: [phone]            Recent Results (from the past 24 hour(s))   URINALYSIS W/MICROSCOPIC    Collection Time: 10/03/17 10:57 PM   Result Value Ref Range    Color YELLOW/STRAW      Appearance CLEAR CLEAR      Specific gravity 1.015 1.003 - 1.030      pH (UA) 7.5 5.0 - 8.0      Protein NEGATIVE  NEG mg/dL    Glucose NEGATIVE  NEG mg/dL    Ketone NEGATIVE  NEG mg/dL    Bilirubin NEGATIVE  NEG      Blood NEGATIVE  NEG      Urobilinogen 0.2 0.2 - 1.0 EU/dL    Nitrites NEGATIVE  NEG      Leukocyte Esterase NEGATIVE  NEG      WBC 0-4 0 - 4 /hpf    RBC 0-5 0 - 5 /hpf    Epithelial cells FEW FEW /lpf    Bacteria NEGATIVE  NEG /hpf   CBC WITH AUTOMATED DIFF    Collection Time: 10/03/17 10:57 PM   Result Value Ref Range    WBC 10.9 3.6 - 11.0 K/uL    RBC 4.36 3.80 - 5.20 M/uL    HGB 10.6 (L) 11.5 - 16.0 g/dL    HCT 34.5 (L) 35.0 - 47.0 %    MCV 79.1 (L) 80.0 - 99.0 FL    MCH 24.3 (L) 26.0 - 34.0 PG    MCHC 30.7 30.0 - 36.5 g/dL    RDW 18.2 (H) 11.5 - 14.5 %    PLATELET 626 (H) 497 - 400 K/uL    NEUTROPHILS 59 32 - 75 %    LYMPHOCYTES 31 12 - 49 %    MONOCYTES 8 5 - 13 %    EOSINOPHILS 2 0 - 7 %    BASOPHILS 0 0 - 1 %    ABS. NEUTROPHILS 6.3 1.8 - 8.0 K/UL    ABS. LYMPHOCYTES 3.4 0.8 - 3.5 K/UL    ABS. MONOCYTES 0.8 0.0 - 1.0 K/UL    ABS. EOSINOPHILS 0.2 0.0 - 0.4 K/UL    ABS. BASOPHILS 0.0 0.0 - 0.1 K/UL    DF AUTOMATED     METABOLIC PANEL, COMPREHENSIVE    Collection Time: 10/03/17 10:57 PM   Result Value Ref Range    Sodium 140 136 - 145 mmol/L    Potassium 3.7 3.5 - 5.1 mmol/L    Chloride 104 97 - 108 mmol/L    CO2 29 21 - 32 mmol/L    Anion gap 7 5 - 15 mmol/L    Glucose 87 65 - 100 mg/dL    BUN 8 6 - 20 MG/DL    Creatinine 0.66 0.55 - 1.02 MG/DL    BUN/Creatinine ratio 12 12 - 20      GFR est AA >60 >60 ml/min/1.73m2    GFR est non-AA >60 >60 ml/min/1.73m2    Calcium 8.4 (L) 8.5 - 10.1 MG/DL    Bilirubin, total 0.3 0.2 - 1.0 MG/DL    ALT (SGPT) 18 12 - 78 U/L    AST (SGOT) 18 15 - 37 U/L    Alk.  phosphatase 69 45 - 117 U/L    Protein, total 7.9 6.4 - 8.2 g/dL    Albumin 3.3 (L) 3.5 - 5.0 g/dL    Globulin 4.6 (H) 2.0 - 4.0 g/dL    A-G Ratio 0.7 (L) 1.1 - 2.2     LIPASE    Collection Time: 10/03/17 10:57 PM   Result Value Ref Range    Lipase 106 73 - 393 U/L   HCG URINE, QL. - POC    Collection Time: 10/03/17 11:00 PM   Result Value Ref Range    Pregnancy test,urine (POC) NEGATIVE  NEG         No results found. Abdominal Pain: Care Instructions  Your Care Instructions    Abdominal pain has many possible causes. Some aren't serious and get better on their own in a few days. Others need more testing and treatment. If your pain continues or gets worse, you need to be rechecked and may need more tests to find out what is wrong. You may need surgery to correct the problem. Don't ignore new symptoms, such as fever, nausea and vomiting, urination problems, pain that gets worse, and dizziness. These may be signs of a more serious problem. Your doctor may have recommended a follow-up visit in the next 8 to 12 hours. If you are not getting better, you may need more tests or treatment. The doctor has checked you carefully, but problems can develop later. If you notice any problems or new symptoms, get medical treatment right away. Follow-up care is a key part of your treatment and safety. Be sure to make and go to all appointments, and call your doctor if you are having problems. It's also a good idea to know your test results and keep a list of the medicines you take. How can you care for yourself at home? · Rest until you feel better. · To prevent dehydration, drink plenty of fluids, enough so that your urine is light yellow or clear like water. Choose water and other caffeine-free clear liquids until you feel better. If you have kidney, heart, or liver disease and have to limit fluids, talk with your doctor before you increase the amount of fluids you drink.   · If your stomach is upset, eat mild foods, such as rice, dry toast or crackers, bananas, and applesauce. Try eating several small meals instead of two or three large ones. · Wait until 48 hours after all symptoms have gone away before you have spicy foods, alcohol, and drinks that contain caffeine. · Do not eat foods that are high in fat. · Avoid anti-inflammatory medicines such as aspirin, ibuprofen (Advil, Motrin), and naproxen (Aleve). These can cause stomach upset. Talk to your doctor if you take daily aspirin for another health problem. When should you call for help? Call 911 anytime you think you may need emergency care. For example, call if:  · You passed out (lost consciousness). · You pass maroon or very bloody stools. · You vomit blood or what looks like coffee grounds. · You have new, severe belly pain. Call your doctor now or seek immediate medical care if:  · Your pain gets worse, especially if it becomes focused in one area of your belly. · You have a new or higher fever. · Your stools are black and look like tar, or they have streaks of blood. · You have unexpected vaginal bleeding. · You have symptoms of a urinary tract infection. These may include:  ¨ Pain when you urinate. ¨ Urinating more often than usual.  ¨ Blood in your urine. · You are dizzy or lightheaded, or you feel like you may faint. Watch closely for changes in your health, and be sure to contact your doctor if:  · You are not getting better after 1 day (24 hours). Where can you learn more? Go to http://patricia-nan.info/. Enter B677 in the search box to learn more about \"Abdominal Pain: Care Instructions. \"  Current as of: March 20, 2017  Content Version: 11.3  © 4477-3316 [employer]. Care instructions adapted under license by [employer] (which disclaims liability or warranty for this information).  If you have questions about a medical condition or this instruction, always ask your healthcare professional. Loli Fowler[employer] disclaims any warranty or liability for your use of this information. Painful Urination (Dysuria): Care Instructions  Your Care Instructions  Burning pain with urination (dysuria) is a common symptom of a urinary tract infection or other urinary problems. The bladder may become inflamed. This can cause pain when the bladder fills and empties. You may also feel pain if the tube that carries urine from the bladder to the outside of the body (urethra) gets irritated or infected. Sexually transmitted infections (STIs) also may cause pain when you urinate. Sometimes the pain can be caused by things other than an infection. The urethra can be irritated by soaps, perfumes, or foreign objects in the urethra. Kidney stones can cause pain when they pass through the urethra. The cause may be hard to find. You may need tests. Treatment for painful urination depends on the cause. Follow-up care is a key part of your treatment and safety. Be sure to make and go to all appointments, and call your doctor if you are having problems. It's also a good idea to know your test results and keep a list of the medicines you take. How can you care for yourself at home? · Drink extra water for the next day or two. This will help make the urine less concentrated. (If you have kidney, heart, or liver disease and have to limit fluids, talk with your doctor before you increase the amount of fluids you drink.)  · Avoid drinks that are carbonated or have caffeine. They can irritate the bladder. · Urinate often. Try to empty your bladder each time. For women:  · Urinate right after you have sex. · After going to the bathroom, wipe from front to back. · Avoid douches, bubble baths, and feminine hygiene sprays. And avoid other feminine hygiene products that have deodorants. When should you call for help?   Call your doctor now or seek immediate medical care if:  · You have new symptoms, such as fever, nausea, or vomiting. · You have new or worse symptoms of a urinary problem. For example:  ¨ You have blood or pus in your urine. ¨ You have chills or body aches. ¨ It hurts worse to urinate. ¨ You have groin or belly pain. ¨ You have pain in your back just below your rib cage (the flank area). Watch closely for changes in your health, and be sure to contact your doctor if you have any problems. Where can you learn more? Go to http://patricia-nan.info/. Enter J741 in the search box to learn more about \"Painful Urination (Dysuria): Care Instructions. \"  Current as of: November 28, 2016  Content Version: 11.3  © 0964-2509 [employer]. Care instructions adapted under license by [employer] (which disclaims liability or warranty for this information). If you have questions about a medical condition or this instruction, always ask your healthcare professional. [place] [address] any warranty or liability for your use of this information.

## 2017-10-04 NOTE — ED TRIAGE NOTES
Pt presents with complaint abd and vaginal pain with nausea since this AM. Pt reports fever of 101 earlier today. Pt denies vaginal discharge. Family at bedside. Call bell within reach.

## 2017-10-04 NOTE — ED PROVIDER NOTES
HPI Comments: Pt presents with complaint of suprapubic abd and vaginal pain with nausea/vomiting since this AM. Pt reports fever of 101 earlier today. Pt denies vaginal discharge. Patient is a 44 y.o. female presenting with abdominal pain. The history is provided by the patient. No  was used. Abdominal Pain    This is a new problem. The current episode started 12 to 24 hours ago. The problem occurs hourly. The problem has been gradually worsening. The pain is located in the suprapubic region. The quality of the pain is aching and sharp. The pain is at a severity of 8/10. The pain is moderate. Associated symptoms include a fever, nausea, vomiting, dysuria and frequency. Pertinent negatives include no anorexia, no diarrhea, no hematuria, no headaches, no myalgias, no trauma, no chest pain and no back pain. The pain is worsened by palpation. The pain is relieved by nothing. Past workup includes surgery. Her past medical history does not include PUD, GERD, UTI or DM. The patient's surgical history includes cholecystectomy. Past Medical History:   Diagnosis Date    Anxiety     Asthma     Hypertension        Past Surgical History:   Procedure Laterality Date    HX CHOLECYSTECTOMY      HX CYST REMOVAL      HX TUBAL LIGATION           Family History:   Problem Relation Age of Onset    Hypertension Mother     Hypertension Sister     Cancer Maternal Aunt        Social History     Social History    Marital status: SINGLE     Spouse name: N/A    Number of children: N/A    Years of education: N/A     Occupational History    Not on file.      Social History Main Topics    Smoking status: Former Smoker    Smokeless tobacco: Never Used    Alcohol use No    Drug use: No    Sexual activity: Yes     Partners: Male     Birth control/ protection: Condom     Other Topics Concern    Not on file     Social History Narrative     ALLERGIES: Bactrim [sulfamethoprim ds]    Review of Systems Constitutional: Positive for fever. Negative for appetite change, chills and unexpected weight change. HENT: Negative for ear pain, hearing loss, rhinorrhea and trouble swallowing. Eyes: Negative for pain and visual disturbance. Respiratory: Negative for cough, chest tightness and shortness of breath. Cardiovascular: Negative for chest pain and palpitations. Gastrointestinal: Positive for abdominal pain, nausea and vomiting. Negative for abdominal distention, anorexia, blood in stool and diarrhea. Genitourinary: Positive for dysuria, frequency, urgency and vaginal pain. Negative for hematuria, vaginal bleeding and vaginal discharge. Musculoskeletal: Negative for back pain and myalgias. Skin: Negative for rash. Neurological: Negative for dizziness, syncope, weakness, numbness and headaches. Psychiatric/Behavioral: Negative for confusion and suicidal ideas. All other systems reviewed and are negative. Vitals:    10/03/17 2245 10/03/17 2338   BP: 198/88 145/77   Pulse: 90 76   Resp: 16 16   Temp: 98.3 °F (36.8 °C)    SpO2: 96% 97%   Weight: 146.4 kg (322 lb 12.1 oz)    Height: 5' 8\" (1.727 m)             Physical Exam   Constitutional: She is oriented to person, place, and time. She appears well-developed and well-nourished. No distress. HENT:   Head: Normocephalic and atraumatic. Right Ear: External ear normal.   Left Ear: External ear normal.   Nose: Nose normal.   Mouth/Throat: Oropharynx is clear and moist. No oropharyngeal exudate. Eyes: Conjunctivae and EOM are normal. Pupils are equal, round, and reactive to light. Right eye exhibits no discharge. Left eye exhibits no discharge. No scleral icterus. Neck: Normal range of motion. Neck supple. No JVD present. No tracheal deviation present. Cardiovascular: Normal rate, regular rhythm, normal heart sounds and intact distal pulses. Exam reveals no gallop and no friction rub. No murmur heard.   Pulmonary/Chest: Effort normal and breath sounds normal. No stridor. No respiratory distress. She has no decreased breath sounds. She has no wheezes. She has no rhonchi. She has no rales. She exhibits no tenderness. Abdominal: Soft. Bowel sounds are normal. She exhibits no distension. There is tenderness in the suprapubic area. There is no rebound and no guarding. Genitourinary:   Genitourinary Comments: Pt deferred. Musculoskeletal: Normal range of motion. She exhibits no edema or tenderness. Neurological: She is alert and oriented to person, place, and time. She has normal strength and normal reflexes. No cranial nerve deficit or sensory deficit. She exhibits normal muscle tone. Coordination normal. GCS eye subscore is 4. GCS verbal subscore is 5. GCS motor subscore is 6. Skin: Skin is warm and dry. No rash noted. She is not diaphoretic. No erythema. No pallor. Psychiatric: She has a normal mood and affect. Her behavior is normal. Judgment and thought content normal.   Nursing note and vitals reviewed. MDM  Number of Diagnoses or Management Options  Abdominal pain, generalized:   Dysuria:      Amount and/or Complexity of Data Reviewed  Clinical lab tests: ordered and reviewed    Risk of Complications, Morbidity, and/or Mortality  Presenting problems: moderate  Diagnostic procedures: low  Management options: low    Patient Progress  Patient progress: improved    ED Course       Procedures  Chief Complaint   Patient presents with    Abdominal Pain       11:52 PM  The patients presenting problems have been discussed, and they are in agreement with the care plan formulated and outlined with them. I have encouraged them to ask questions as they arise throughout their visit.     MEDICATIONS GIVEN:  Medications   sodium chloride (NS) flush 5-10 mL (not administered)   sodium chloride (NS) flush 5-10 mL (10 mL IntraVENous Given 10/3/17 2304)   ketorolac (TORADOL) injection 30 mg (30 mg IntraVENous Given 10/3/17 2304)   sodium chloride 0.9 % bolus infusion 1,000 mL (0 mL IntraVENous IV Completed 10/3/17 5886)   [facility]) injection 4 mg (4 mg IntraVENous Given 10/3/17 9770)       LABS REVIEWED:  Recent Results (from the past 24 hour(s))   URINALYSIS W/MICROSCOPIC    Collection Time: 10/03/17 10:57 PM   Result Value Ref Range    Color YELLOW/STRAW      Appearance CLEAR CLEAR      Specific gravity 1.015 1.003 - 1.030      pH (UA) 7.5 5.0 - 8.0      Protein NEGATIVE  NEG mg/dL    Glucose NEGATIVE  NEG mg/dL    Ketone NEGATIVE  NEG mg/dL    Bilirubin NEGATIVE  NEG      Blood NEGATIVE  NEG      Urobilinogen 0.2 0.2 - 1.0 EU/dL    Nitrites NEGATIVE  NEG      Leukocyte Esterase NEGATIVE  NEG      WBC 0-4 0 - 4 /hpf    RBC 0-5 0 - 5 /hpf    Epithelial cells FEW FEW /lpf    Bacteria NEGATIVE  NEG /hpf   CBC WITH AUTOMATED DIFF    Collection Time: 10/03/17 10:57 PM   Result Value Ref Range    WBC 10.9 3.6 - 11.0 K/uL    RBC 4.36 3.80 - 5.20 M/uL    HGB 10.6 (L) 11.5 - 16.0 g/dL    HCT 34.5 (L) 35.0 - 47.0 %    MCV 79.1 (L) 80.0 - 99.0 FL    MCH 24.3 (L) 26.0 - 34.0 PG    MCHC 30.7 30.0 - 36.5 g/dL    RDW 18.2 (H) 11.5 - 14.5 %    PLATELET 845 (H) 752 - 400 K/uL    NEUTROPHILS 59 32 - 75 %    LYMPHOCYTES 31 12 - 49 %    MONOCYTES 8 5 - 13 %    EOSINOPHILS 2 0 - 7 %    BASOPHILS 0 0 - 1 %    ABS. NEUTROPHILS 6.3 1.8 - 8.0 K/UL    ABS. LYMPHOCYTES 3.4 0.8 - 3.5 K/UL    ABS. MONOCYTES 0.8 0.0 - 1.0 K/UL    ABS. EOSINOPHILS 0.2 0.0 - 0.4 K/UL    ABS.  BASOPHILS 0.0 0.0 - 0.1 K/UL    DF AUTOMATED     METABOLIC PANEL, COMPREHENSIVE    Collection Time: 10/03/17 10:57 PM   Result Value Ref Range    Sodium 140 136 - 145 mmol/L    Potassium 3.7 3.5 - 5.1 mmol/L    Chloride 104 97 - 108 mmol/L    CO2 29 21 - 32 mmol/L    Anion gap 7 5 - 15 mmol/L    Glucose 87 65 - 100 mg/dL    BUN 8 6 - 20 MG/DL    Creatinine 0.66 0.55 - 1.02 MG/DL    BUN/Creatinine ratio 12 12 - 20      GFR est AA >60 >60 ml/min/1.73m2    GFR est non-AA >60 >60 ml/min/1.73m2 Calcium 8.4 (L) 8.5 - 10.1 MG/DL    Bilirubin, total 0.3 0.2 - 1.0 MG/DL    ALT (SGPT) 18 12 - 78 U/L    AST (SGOT) 18 15 - 37 U/L    Alk. phosphatase 69 45 - 117 U/L    Protein, total 7.9 6.4 - 8.2 g/dL    Albumin 3.3 (L) 3.5 - 5.0 g/dL    Globulin 4.6 (H) 2.0 - 4.0 g/dL    A-G Ratio 0.7 (L) 1.1 - 2.2     LIPASE    Collection Time: 10/03/17 10:57 PM   Result Value Ref Range    Lipase 106 73 - 393 U/L   HCG URINE, QL. - POC    Collection Time: 10/03/17 11:00 PM   Result Value Ref Range    Pregnancy test,urine (POC) NEGATIVE  NEG         VITAL SIGNS:  Patient Vitals for the past 12 hrs:   Temp Pulse Resp BP SpO2   10/03/17 2338 - 76 16 145/77 97 %   10/03/17 2245 98.3 °F (36.8 °C) 90 16 198/88 96 %       RADIOLOGY RESULTS:  The following have been ordered and reviewed:  No orders to display     PROGRESS NOTES:  Pt feeling better. Offered to do pelvic in ED. Pt wishes to follow up with PCP/OB/Gyn for this. Discussed results and plan with patient. Patient will be discharged home with PCP followup. Patient instructed to return to the emergency room for any worsening symptoms or any other concerns. DIAGNOSIS:    1. Abdominal pain, generalized    2. Dysuria        PLAN:  Follow-up Information     Follow up With Details Comments Contact Info    [address] [facility] [address], DO In 1 week  [address] [place] [address] [address] [facility]  If symptoms worsen [address]  [phone]        Discharge Medication List as of 10/3/2017 11:34 PM      START taking these medications    Details   diclofenac EC (VOLTAREN) 75 mg EC tablet Take 1 Tab by mouth two (2) times a day., Print, Disp-30 Tab, R-0      ondansetron (ZOFRAN ODT) 4 mg disintegrating tablet Take 1 Tab by mouth every eight (8) hours as needed for Nausea. , Print, Disp-10 Tab, R-0         CONTINUE these medications which have NOT CHANGED    Details   clonazePAM (KLONOPIN) 1 mg tablet Take 1 Tab by mouth daily as needed. - must last 30 days, Phone In, Disp-30 Tab, R-2      albuterol (VENTOLIN HFA) 90 mcg/actuation inhaler TAKE 2 PUFFS EVERY 4 HOURS AS NEEDED FOR WHEEZING, Normal, Disp-18 Inhaler, R-1      levocetirizine (XYZAL) 5 mg tablet Take 1 Tab by mouth daily. , Normal, Disp-30 Tab, R-5      ammonium lactate (LAC-HYDRIN) 12 % topical cream Apply  to affected area two (2) times a day. rub in to affected area well, Normal, Disp-280 g, R-5      amLODIPine (NORVASC) 5 mg tablet Take 1 Tab by mouth daily. , Normal, Disp-30 Tab, R-5               ED COURSE: The patients hospital course has been uncomplicated.

## 2017-10-26 ENCOUNTER — OFFICE VISIT (OUTPATIENT)
Dept: FAMILY MEDICINE CLINIC | Age: 37
End: 2017-10-26

## 2017-10-26 VITALS
RESPIRATION RATE: 18 BRPM | SYSTOLIC BLOOD PRESSURE: 150 MMHG | OXYGEN SATURATION: 99 % | HEIGHT: 68 IN | WEIGHT: 293 LBS | BODY MASS INDEX: 44.41 KG/M2 | TEMPERATURE: 98.7 F | HEART RATE: 96 BPM | DIASTOLIC BLOOD PRESSURE: 98 MMHG

## 2017-10-26 DIAGNOSIS — R10.9 ABDOMINAL PAIN, UNSPECIFIED ABDOMINAL LOCATION: Primary | ICD-10-CM

## 2017-10-26 DIAGNOSIS — R11.0 NAUSEA WITHOUT VOMITING: ICD-10-CM

## 2017-10-26 DIAGNOSIS — J45.20 MILD INTERMITTENT ASTHMA WITHOUT COMPLICATION: ICD-10-CM

## 2017-10-26 RX ORDER — ALBUTEROL SULFATE 90 UG/1
1 AEROSOL, METERED RESPIRATORY (INHALATION)
Qty: 1 INHALER | Refills: 2 | Status: SHIPPED | OUTPATIENT
Start: 2017-10-26 | End: 2018-06-08 | Stop reason: SDUPTHER

## 2017-10-26 RX ORDER — PROMETHAZINE HYDROCHLORIDE 25 MG/1
25 TABLET ORAL
Qty: 30 TAB | Refills: 1 | Status: SHIPPED | OUTPATIENT
Start: 2017-10-26 | End: 2018-03-01

## 2017-10-26 NOTE — MR AVS SNAPSHOT
Visit Information Date & Time Provider Department Dept. Phone Encounter #  
 10/26/2017  4:00 PM Nano Jung NP [facility] [phone] [id number] Follow-up Instructions Return if symptoms worsen or fail to improve. Upcoming Health Maintenance Date Due INFLUENZA AGE 9 TO ADULT 8/1/2017 PAP AKA CERVICAL CYTOLOGY 3/31/2020 DTaP/Tdap/Td series (2 - Td) 3/31/2027 Allergies as of 10/26/2017  Review Complete On: 10/26/2017 By: Yesy Israel LPN Severity Noted Reaction Type Reactions Bactrim [Sulfamethoprim Ds]  12/16/2013    Hives Current Immunizations  Reviewed on 1/21/2016 No immunizations on file. Not reviewed this visit You Were Diagnosed With   
  
 Codes Comments Abdominal pain, unspecified abdominal location    -  Primary ICD-10-CM: R10.9 ICD-9-CM: 789.00 Nausea without vomiting     ICD-10-CM: R11.0 ICD-9-CM: 787.02 Vitals BP Pulse Temp Resp Height(growth percentile) Weight(growth percentile) (!) 161/96 (BP 1 Location: Right arm, BP Patient Position: Sitting) 96 98.7 °F (37.1 °C) (Oral) 18 5' 8\" (1.727 m) 321 lb (145.6 kg) LMP SpO2 BMI OB Status Smoking Status 09/30/2017 99% 48.81 kg/m2 Having regular periods Former Smoker BMI and BSA Data Body Mass Index Body Surface Area  
 48.81 kg/m 2 2.64 m 2 Preferred Pharmacy Pharmacy Name Phone [id number], [address] [facility] [phone] Your Updated Medication List  
  
   
This list is accurate as of: 10/26/17  4:21 PM.  Always use your most recent med list.  
  
  
  
  
 albuterol 90 mcg/actuation inhaler Commonly known as:  VENTOLIN HFA  
TAKE 2 PUFFS EVERY 4 HOURS AS NEEDED FOR WHEEZING  
  
 amLODIPine 5 mg tablet Commonly known as:  Yudelka Darting Take 1 Tab by mouth daily.   
  
 ammonium lactate 12 % topical cream  
Commonly known as:  LAC-HYDRIN  
 Apply  to affected area two (2) times a day. rub in to affected area well  
  
 clonazePAM 1 mg tablet Commonly known as:  Melburn End Take 1 Tab by mouth daily as needed. - must last 30 days  
  
 diclofenac EC 75 mg EC tablet Commonly known as:  VOLTAREN Take 1 Tab by mouth two (2) times a day. levocetirizine 5 mg tablet Commonly known as:  Natty Downs Take 1 Tab by mouth daily. promethazine 25 mg tablet Commonly known as:  PHENERGAN Take 1 Tab by mouth every eight (8) hours as needed for Nausea. Prescriptions Sent to Pharmacy Refills  
 promethazine (PHENERGAN) 25 mg tablet 1 Sig: Take 1 Tab by mouth every eight (8) hours as needed for Nausea. Class: Normal  
 Pharmacy: [facility] [address] Ph #: [phone] Route: Oral  
  
We Performed the Following REFERRAL TO GASTROENTEROLOGY [IXE28 Custom] Follow-up Instructions Return if symptoms worsen or fail to improve. Referral Information Referral ID Referred By Referred To  
  
 [id number] [place] [address] [address] [place] [address], [address] [place] [address] Visits Status Start Date End Date 1 New Request 10/26/17 10/26/18 If your referral has a status of pending review or denied, additional information will be sent to support the outcome of this decision. Introducing [facility]! Fermin Arguello introduces [employer] patient portal. Now you can access parts of your medical record, email your doctor's office, and request medication refills online. 1. In your internet browser, go to [email or web address] 2. Click on the First Time User? Click Here link in the Sign In box. You will see the New Member Sign Up page. 3. Enter your [employer] Access Code exactly as it appears below. You will not need to use this code after youve completed the sign-up process.  If you do not sign up before the expiration date, you must request a new code. · [employer] Access Code: [email or web address] Expires: 11/12/2017  4:18 PM 
 
4. Enter the last four digits of your Social Security Number (xxxx) and Date of Birth (mm/dd/yyyy) as indicated and click Submit. You will be taken to the next sign-up page. 5. Create a [employer] ID. This will be your [employer] login ID and cannot be changed, so think of one that is secure and easy to remember. 6. Create a [employer] password. You can change your password at any time. 7. Enter your Password Reset Question and Answer. This can be used at a later time if you forget your password. 8. Enter your e-mail address. You will receive e-mail notification when new information is available in [address] 9. Click Sign Up. You can now view and download portions of your medical record. 10. Click the Download Summary menu link to download a portable copy of your medical information. If you have questions, please visit the Frequently Asked Questions section of the [employer] website. Remember, [employer] is NOT to be used for urgent needs. For medical emergencies, dial 911. Now available from your iPhone and Android! Please provide this summary of care documentation to your next provider. Your primary care clinician is listed as Chandrika Bird. If you have any questions after today's visit, please call [phone].

## 2017-10-26 NOTE — PROGRESS NOTES
Chief Complaint   Patient presents with    Abdominal Pain     Patient in office today for abdominal pain that began one month; have been treating with ibuprofen and tylenol with no relief noted. 1. Have you been to the ER, urgent care clinic since your last visit? Hospitalized since your last visit? No    2. Have you seen or consulted any other health care providers outside of the [address] [place] [address] since your last visit? Include any pap smears or colon screening. No    Pt has a history of this pain occurring about 4 years ago. Pt had to have a colonoscopy and was diagnosed with diverticulitis. Pain is in the lower abdomen. Associated nausea. Pain comes and goes. Varies in severity. Has been taking abx for a bladder infection. Completed keflex a few days ago. Denies any dark or tarry stools. Denies any blood in the stool. Occasionally constipated. Pt has seen OBGYN who did US imaging that was normal.     Pt completed ct of abdomen and pelvis at [facility] and was told that everything looked normal.     Denies any other concerns at this time. Chief Complaint   Patient presents with    Abdominal Pain     she is a 44y.o. year old female who presents for evalution. Reviewed PmHx, RxHx, FmHx, SocHx, AllgHx and updated and dated in the chart.     Review of Systems - negative except as listed above in the HPI    Objective:     Vitals:    10/26/17 1603 10/26/17 1621   BP: (!) 161/96 (!) 150/98   Pulse: 96    Resp: 18    Temp: 98.7 °F (37.1 °C)    TempSrc: Oral    SpO2: 99%    Weight: 321 lb (145.6 kg)    Height: 5' 8\" (1.727 m)      Physical Examination: General appearance - alert, well appearing, and in no distress and overweight  Eyes - pupils equal and reactive, extraocular eye movements intact  Ears - bilateral TM's and external ear canals normal  Nose - normal and patent, no erythema, discharge or polyps and normal nontender sinuses  Mouth - mucous membranes moist, pharynx normal without lesions  Neck - supple, no significant adenopathy  Chest - clear to auscultation, no wheezes, rales or rhonchi, symmetric air entry  Heart - normal rate, regular rhythm, normal S1, S2, no murmurs  Abdomen - tenderness noted in lower abdomen with palpation, no palpable abnormality or masses on exam  bowel sounds normal    Assessment/ Plan:   Diagnoses and all orders for this visit:    1. Abdominal pain, unspecified abdominal location  -     [facility]  Enc pt to [facility] care with Dr. Luis Angel Matthews for further evaluation of persistent sx. Pt requesting pain medication. Discussed that anything stronger than OTC medication for pain could put pt at risk for serious injury. Reviewed acute/worsening s/sx that warrant more immediate medical attention an pt verbalized understanding of this. 2. Nausea without vomiting  -     promethazine (PHENERGAN) 25 mg tablet; Take 1 Tab by mouth every eight (8) hours as needed for Nausea. Take as needed for nausea. Reviewed SEs/ADRs of medication. 3. Mild intermittent asthma without complication  -     albuterol (VENTOLIN HFA) 90 mcg/actuation inhaler; Take 1 Puff by inhalation every four (4) hours as needed for Wheezing. Refilled rx. Follow-up Disposition:  Return if symptoms worsen or fail to improve. I have discussed the diagnosis with the patient and the intended plan as seen in the above orders. The patient has received an after-visit summary and questions were answered concerning future plans. Medication Side Effects and Warnings were discussed with patient: yes  Patient Labs were reviewed and or requested: yes  Patient Past Records were reviewed and or requested  yes  Patient / Caregiver Understanding of treatment plan was verbalized during office visit YES    AUSTIN Carrera    There are no Patient Instructions on file for this visit.

## 2017-10-26 NOTE — PROGRESS NOTES
Chief Complaint   Patient presents with    Abdominal Pain     Patient in office today for abdominal pain that began one month; have been treating with ibuprofen and tylenol with no relief noted. 1. Have you been to the ER, urgent care clinic since your last visit? Hospitalized since your last visit? No    2. Have you seen or consulted any other health care providers outside of the [address] [place] [address] since your last visit? Include any pap smears or colon screening.  No

## 2017-10-26 NOTE — LETTER
NOTIFICATION RETURN TO WORK / SCHOOL 
 
10/26/2017 4:23 PM 
 
Ms. Geo Marcelino [address] [phone] To Whom It May Concern: 
 
Geo Marcelino is currently under the care of [address]. She will return to work/school on: October 27, 2017 If there are questions or concerns please have the patient contact our office.  
 
 
 
Sincerely, 
 
 
Phyllis Hancock NP

## 2017-11-07 ENCOUNTER — TELEPHONE (OUTPATIENT)
Dept: FAMILY MEDICINE CLINIC | Age: 37
End: 2017-11-07

## 2017-11-07 DIAGNOSIS — F41.9 ANXIETY: ICD-10-CM

## 2017-11-07 RX ORDER — CLONAZEPAM 1 MG/1
1 TABLET ORAL
Qty: 30 TAB | Refills: 2 | Status: SHIPPED | OUTPATIENT
Start: 2017-11-07 | End: 2018-06-08 | Stop reason: SDUPTHER

## 2017-11-07 NOTE — TELEPHONE ENCOUNTER
Patient states she has been under a lot of stress & was taking 2 clonzepam for some days. She ran out yesterday & cannot refill until Sunday 11/12/17. She has another refill but it is too early to get it refilled. Can you call pharmacyso she can get refill today?  Call her @[phone]

## 2017-11-07 NOTE — TELEPHONE ENCOUNTER
----- Message from [place] sent at 11/7/2017  2:43 PM EST -----  Regarding: SONALI Solorio/Telephone  Ada Anderson. at [address] [facility] [phone] received a voicemail on  Rx Klonopin 1 mg. Pt stated the doctor is ok with filling Rx early but that was not stated in the voicemail. Fax [phone] they need clarification.

## 2017-11-07 NOTE — TELEPHONE ENCOUNTER
Will fill early this once but if she continues to have increased anxiety sx pt need to follow up to discuss starting a daily medication to better regular her anxiety. 30 tabs of klonopin should last 30 days, period. Please call in rx for klonopin to pharmacy on file and notify pt when done.

## 2017-11-07 NOTE — TELEPHONE ENCOUNTER
Medication has been called into pharmacy. Pt has been notified via vm; advised no early refills. Advised to call office for med check if klonopin does not last 30 days or to call office with any concerns.

## 2017-12-26 ENCOUNTER — HOSPITAL ENCOUNTER (EMERGENCY)
Age: 37
Discharge: HOME | End: 2017-12-26
Attending: EMERGENCY MEDICINE
Payer: SELF-PAY

## 2017-12-26 ENCOUNTER — APPOINTMENT (OUTPATIENT)
Dept: GENERAL RADIOLOGY | Age: 37
End: 2017-12-26
Attending: EMERGENCY MEDICINE
Payer: SELF-PAY

## 2017-12-26 VITALS
BODY MASS INDEX: 45.99 KG/M2 | OXYGEN SATURATION: 98 % | DIASTOLIC BLOOD PRESSURE: 64 MMHG | RESPIRATION RATE: 15 BRPM | HEIGHT: 67 IN | WEIGHT: 293 LBS | HEART RATE: 87 BPM | TEMPERATURE: 98.2 F | SYSTOLIC BLOOD PRESSURE: 124 MMHG

## 2017-12-26 DIAGNOSIS — R09.1 PLEURISY: Primary | ICD-10-CM

## 2017-12-26 DIAGNOSIS — J06.9 ACUTE URI: ICD-10-CM

## 2017-12-26 LAB
ALBUMIN SERPL-MCNC: 3.4 G/DL (ref 3.5–5)
ALBUMIN/GLOB SERPL: 0.8 {RATIO} (ref 1.1–2.2)
ALP SERPL-CCNC: 64 U/L (ref 45–117)
ALT SERPL-CCNC: 17 U/L (ref 12–78)
ANION GAP SERPL CALC-SCNC: 8 MMOL/L (ref 5–15)
APPEARANCE UR: CLEAR
AST SERPL-CCNC: 12 U/L (ref 15–37)
BACTERIA URNS QL MICRO: NEGATIVE /HPF
BASOPHILS # BLD: 0 K/UL (ref 0–0.1)
BASOPHILS NFR BLD: 0 % (ref 0–1)
BILIRUB SERPL-MCNC: 0.4 MG/DL (ref 0.2–1)
BILIRUB UR QL: NEGATIVE
BUN SERPL-MCNC: 13 MG/DL (ref 6–20)
BUN/CREAT SERPL: 17 (ref 12–20)
CALCIUM SERPL-MCNC: 8.8 MG/DL (ref 8.5–10.1)
CHLORIDE SERPL-SCNC: 104 MMOL/L (ref 97–108)
CO2 SERPL-SCNC: 28 MMOL/L (ref 21–32)
COLOR UR: ABNORMAL
CREAT SERPL-MCNC: 0.77 MG/DL (ref 0.55–1.02)
D DIMER PPP FEU-MCNC: 0.44 MG/L FEU (ref 0–0.65)
DIFFERENTIAL METHOD BLD: ABNORMAL
EOSINOPHIL # BLD: 0.3 K/UL (ref 0–0.4)
EOSINOPHIL NFR BLD: 2 % (ref 0–7)
EPITH CASTS URNS QL MICRO: ABNORMAL /LPF
ERYTHROCYTE [DISTWIDTH] IN BLOOD BY AUTOMATED COUNT: 18.5 % (ref 11.5–14.5)
GLOBULIN SER CALC-MCNC: 4.5 G/DL (ref 2–4)
GLUCOSE SERPL-MCNC: 91 MG/DL (ref 65–100)
GLUCOSE UR STRIP.AUTO-MCNC: NEGATIVE MG/DL
HCG UR QL: NEGATIVE
HCT VFR BLD AUTO: 34.3 % (ref 35–47)
HGB BLD-MCNC: 10.2 G/DL (ref 11.5–16)
HGB UR QL STRIP: NEGATIVE
KETONES UR QL STRIP.AUTO: ABNORMAL MG/DL
LEUKOCYTE ESTERASE UR QL STRIP.AUTO: NEGATIVE
LYMPHOCYTES # BLD: 3.3 K/UL (ref 0.8–3.5)
LYMPHOCYTES NFR BLD: 24 % (ref 12–49)
MCH RBC QN AUTO: 23.1 PG (ref 26–34)
MCHC RBC AUTO-ENTMCNC: 29.7 G/DL (ref 30–36.5)
MCV RBC AUTO: 77.8 FL (ref 80–99)
MONOCYTES # BLD: 1 K/UL (ref 0–1)
MONOCYTES NFR BLD: 7 % (ref 5–13)
NEUTS SEG # BLD: 8.9 K/UL (ref 1.8–8)
NEUTS SEG NFR BLD: 67 % (ref 32–75)
NITRITE UR QL STRIP.AUTO: NEGATIVE
PH UR STRIP: 5.5 [PH] (ref 5–8)
PLATELET # BLD AUTO: 386 K/UL (ref 150–400)
POTASSIUM SERPL-SCNC: 3.6 MMOL/L (ref 3.5–5.1)
PROT SERPL-MCNC: 7.9 G/DL (ref 6.4–8.2)
PROT UR STRIP-MCNC: NEGATIVE MG/DL
RBC # BLD AUTO: 4.41 M/UL (ref 3.8–5.2)
RBC #/AREA URNS HPF: ABNORMAL /HPF (ref 0–5)
SODIUM SERPL-SCNC: 140 MMOL/L (ref 136–145)
SP GR UR REFRACTOMETRY: 1.03 (ref 1–1.03)
UA: UC IF INDICATED,UAUC: ABNORMAL
UROBILINOGEN UR QL STRIP.AUTO: 0.2 EU/DL (ref 0.2–1)
WBC # BLD AUTO: 13.4 K/UL (ref 3.6–11)
WBC URNS QL MICRO: ABNORMAL /HPF (ref 0–4)

## 2017-12-26 PROCEDURE — 81001 URINALYSIS AUTO W/SCOPE: CPT | Performed by: EMERGENCY MEDICINE

## 2017-12-26 PROCEDURE — 74011250636 HC RX REV CODE- 250/636: Performed by: EMERGENCY MEDICINE

## 2017-12-26 PROCEDURE — 85379 FIBRIN DEGRADATION QUANT: CPT | Performed by: EMERGENCY MEDICINE

## 2017-12-26 PROCEDURE — 71101 X-RAY EXAM UNILAT RIBS/CHEST: CPT

## 2017-12-26 PROCEDURE — 36415 COLL VENOUS BLD VENIPUNCTURE: CPT | Performed by: EMERGENCY MEDICINE

## 2017-12-26 PROCEDURE — 81025 URINE PREGNANCY TEST: CPT

## 2017-12-26 PROCEDURE — 80053 COMPREHEN METABOLIC PANEL: CPT | Performed by: EMERGENCY MEDICINE

## 2017-12-26 PROCEDURE — 74011250637 HC RX REV CODE- 250/637: Performed by: EMERGENCY MEDICINE

## 2017-12-26 PROCEDURE — 96361 HYDRATE IV INFUSION ADD-ON: CPT

## 2017-12-26 PROCEDURE — 99283 EMERGENCY DEPT VISIT LOW MDM: CPT

## 2017-12-26 PROCEDURE — 87086 URINE CULTURE/COLONY COUNT: CPT | Performed by: EMERGENCY MEDICINE

## 2017-12-26 PROCEDURE — 85025 COMPLETE CBC W/AUTO DIFF WBC: CPT | Performed by: EMERGENCY MEDICINE

## 2017-12-26 PROCEDURE — 96374 THER/PROPH/DIAG INJ IV PUSH: CPT

## 2017-12-26 RX ORDER — KETOROLAC TROMETHAMINE 30 MG/ML
30 INJECTION, SOLUTION INTRAMUSCULAR; INTRAVENOUS
Status: COMPLETED | OUTPATIENT
Start: 2017-12-26 | End: 2017-12-26

## 2017-12-26 RX ORDER — ACETAMINOPHEN 500 MG
1000 TABLET ORAL
Status: DISCONTINUED | OUTPATIENT
Start: 2017-12-26 | End: 2017-12-26

## 2017-12-26 RX ORDER — NAPROXEN 500 MG/1
500 TABLET ORAL 2 TIMES DAILY WITH MEALS
Qty: 30 TAB | Refills: 0 | Status: SHIPPED | OUTPATIENT
Start: 2017-12-26 | End: 2018-03-01

## 2017-12-26 RX ORDER — ACETAMINOPHEN 500 MG
1000 TABLET ORAL
Status: COMPLETED | OUTPATIENT
Start: 2017-12-26 | End: 2017-12-26

## 2017-12-26 RX ADMIN — SODIUM CHLORIDE 1000 ML: 900 INJECTION, SOLUTION INTRAVENOUS at 21:09

## 2017-12-26 RX ADMIN — ACETAMINOPHEN 1000 MG: 500 TABLET ORAL at 22:16

## 2017-12-26 RX ADMIN — KETOROLAC TROMETHAMINE 30 MG: 30 INJECTION, SOLUTION INTRAMUSCULAR at 21:09

## 2017-12-27 NOTE — ED NOTES
Patient resting on the stretcher. Call bell within reach; will continue to monitor. Watching TV and sipping on ginger ale.  Family at bedside

## 2017-12-27 NOTE — ED TRIAGE NOTES
Patient c/o one day of left lower ribcage pain with coughing; sore throat and body aches. Ambulated from the front lobby to this room and did not appear to be in distress and no c/o shortness of breath.

## 2017-12-27 NOTE — ED PROVIDER NOTES
HPI Comments: 28-year-old female with a past medical history significant for hypertension and morbid obesity, who presents to the ED with a complaint of left-sided chest pain and flank pain x one week, worse over the past 2 days, sharp and stabbing in nature. Without any aggravating or relieving factors. The patient also admits to dry cough, runny nose, congestion, for 2 days. She denies fever, headache, neck, and back pain, nausea, vomiting, diarrhea, constipation, dysuria, hematuria, dizziness, weakness, and numbness, sick contacts, skin rash, arms, or leg pain or swelling, prior history of the same. Patient is a 44 y.o. female presenting with cough and rib pain. Cough     Rib Pain   Associated symptoms include cough. Past Medical History:   Diagnosis Date    Anxiety     Asthma     Hypertension        Past Surgical History:   Procedure Laterality Date    HX CHOLECYSTECTOMY      HX CYST REMOVAL      HX TUBAL LIGATION           Family History:   Problem Relation Age of Onset    Hypertension Mother     Hypertension Sister     Cancer Maternal Aunt        Social History     Social History    Marital status: SINGLE     Spouse name: N/A    Number of children: N/A    Years of education: N/A     Occupational History    Not on file. Social History Main Topics    Smoking status: Former Smoker    Smokeless tobacco: Never Used    Alcohol use No    Drug use: No    Sexual activity: Yes     Partners: Male     Birth control/ protection: Condom     Other Topics Concern    Not on file     Social History Narrative         ALLERGIES: Bactrim [sulfamethoprim ds]    Review of Systems   Respiratory: Positive for cough. All other systems reviewed and are negative. Vitals:    12/26/17 2031   BP: 137/87   Pulse: (!) 101   Resp: 16   Temp: 98.2 °F (36.8 °C)   SpO2: 98%   Weight: 145.5 kg (320 lb 12.3 oz)   Height: 5' 7\" (1.702 m)            Physical Exam   Nursing note and vitals reviewed. CONSTITUTIONAL: Well-appearing; well-nourished; in no apparent distress  HEAD: Normocephalic; atraumatic  EYES: PERRL; EOM intact; conjunctiva and sclera are clear bilaterally. ENT: No rhinorrhea; normal pharynx with no tonsillar hypertrophy; mucous membranes pink/moist, no erythema, no exudate. NECK: Supple; non-tender; no cervical lymphadenopathy  CARD: Normal S1, S2; no murmurs, rubs, or gallops. Regular rate and rhythm. RESP: Normal respiratory effort; breath sounds clear and equal bilaterally; no wheezes, rhonchi, or rales. Left sided chest wall tenderness with point tenderness at  Ribs 8-10 on palpation  ABD: Normal bowel sounds; non-distended; non-tender; no palpable organomegaly, no masses, no bruits. Back Exam: Normal inspection; no vertebral point tenderness, no CVA tenderness. Normal range of motion. EXT: Normal ROM in all four extremities; non-tender to palpation; no swelling or deformity; distal pulses are normal, no edema. SKIN: Warm; dry; no rash. NEURO:Alert and oriented x 3, coherent, CHRISTINE-XII grossly intact, sensory and motor are non-focal.        MDM  Number of Diagnoses or Management Options  Diagnosis management comments: Assessment: Left-sided chest pain and flank pain-differential diagnosis consists of pleurisy/ myofascial strain/ VTE/ pneumonia/ rib fractures.     Plan: Left rib series x-ray/ lab/ analgesia/ IV fluid/ serial exam/ monitor her and reevaluate       Amount and/or Complexity of Data Reviewed  Clinical lab tests: ordered and reviewed  Tests in the radiology section of CPT®: ordered and reviewed  Tests in the medicine section of CPT®: ordered and reviewed  Discussion of test results with the performing providers: yes  Decide to obtain previous medical records or to obtain history from someone other than the patient: yes  Obtain history from someone other than the patient: yes  Review and summarize past medical records: yes  Discuss the patient with other providers: yes  Independent visualization of images, tracings, or specimens: yes    Risk of Complications, Morbidity, and/or Mortality  Presenting problems: moderate  Diagnostic procedures: moderate  Management options: moderate      ED Course       Procedures     XRAY INTERPRETATION (ED MD)  Left-sided rib series  With PA Chest Xray  No rib fracture identified; No acute process seen. Normal heart size. No bony abnormalities. No infiltrate. Alee Germain MD 9:02 PM    Progress Note:   Pt has been reexamined by Alee Germain MD. Pt is feeling much better. Symptoms have improved. All available results have been reviewed with pt and any available family. Pt understands sx, dx, and tx in ED. Care plan has been outlined and questions have been answered. Pt is ready to go home. Will send home on Pleurisy/ chest wall instruction. Prescription of naproxen. Outpatient referral with PCP as needed. Written by Alee Germain MD,12:34 AM    .   .

## 2017-12-27 NOTE — ED NOTES
The patient was discharged home by Dr Doinisio Sanchez in stable condition. The patient is alert and oriented, in no respiratory distress and discharge vital signs obtained. The patient's diagnosis, condition and treatment were explained. The patient expressed understanding. A discharge plan has been developed. A  was not involved in the process. Aftercare instructions were given. Pt ambulatory out of the ED with family.

## 2017-12-27 NOTE — DISCHARGE INSTRUCTIONS
We hope that we have addressed all of your medical concerns. The examination and treatment you received in the Emergency Department were for an emergent problem and were not intended as complete care. It is important that you follow up with your healthcare provider(s) for ongoing care. If your symptoms worsen or do not improve as expected, and you are unable to reach your usual health care provider(s), you should return to the Emergency Department. Today's healthcare is undergoing tremendous change, and patient satisfaction surveys are one of the many tools to assess the quality of medical care. You may receive a survey from the [employer] organization regarding your experience in the Emergency Department. I hope that your experience has been completely positive, particularly the medical care that I provided. As such, please participate in the survey; anything less than excellent does not meet my expectations or intentions. [facility] and 8 [facility] participate in nationally recognized quality of care measures. If your blood pressure is greater than 120/80, as reported below, we urge that you seek medical care to address the potential of high blood pressure, commonly known as hypertension. Hypertension can be hereditary or can be caused by certain medical conditions, pain, stress, or \"white coat syndrome. \"       Please make an appointment with your health care provider(s) for follow up of your Emergency Department visit. VITALS:   Patient Vitals for the past 8 hrs:   Temp Pulse Resp BP SpO2   12/26/17 2200 - 87 15 124/64 98 %   12/26/17 2031 98.2 °F (36.8 °C) (!) 101 16 137/87 98 %          Thank you for allowing us to provide you with medical care today. We realize that you have many choices for your emergency care needs. Please choose us in the future for any continued health care needs. Nelson Khan, MD    [address] [facility].   Office: [phone]            Recent Results (from the past 24 hour(s))   URINALYSIS W/ REFLEX CULTURE    Collection Time: 12/26/17  8:59 PM   Result Value Ref Range    Color YELLOW/STRAW      Appearance CLEAR CLEAR      Specific gravity 1.029 1.003 - 1.030      pH (UA) 5.5 5.0 - 8.0      Protein NEGATIVE  NEG mg/dL    Glucose NEGATIVE  NEG mg/dL    Ketone TRACE (A) NEG mg/dL    Bilirubin NEGATIVE  NEG      Blood NEGATIVE  NEG      Urobilinogen 0.2 0.2 - 1.0 EU/dL    Nitrites NEGATIVE  NEG      Leukocyte Esterase NEGATIVE  NEG      WBC 0-4 0 - 4 /hpf    RBC 0-5 0 - 5 /hpf    Epithelial cells FEW FEW /lpf    Bacteria NEGATIVE  NEG /hpf    UA:UC IF INDICATED CULTURE NOT INDICATED BY UA RESULT CNI     CBC WITH AUTOMATED DIFF    Collection Time: 12/26/17  8:59 PM   Result Value Ref Range    WBC 13.4 (H) 3.6 - 11.0 K/uL    RBC 4.41 3.80 - 5.20 M/uL    HGB 10.2 (L) 11.5 - 16.0 g/dL    HCT 34.3 (L) 35.0 - 47.0 %    MCV 77.8 (L) 80.0 - 99.0 FL    MCH 23.1 (L) 26.0 - 34.0 PG    MCHC 29.7 (L) 30.0 - 36.5 g/dL    RDW 18.5 (H) 11.5 - 14.5 %    PLATELET 212 402 - 902 K/uL    NEUTROPHILS 67 32 - 75 %    LYMPHOCYTES 24 12 - 49 %    MONOCYTES 7 5 - 13 %    EOSINOPHILS 2 0 - 7 %    BASOPHILS 0 0 - 1 %    ABS. NEUTROPHILS 8.9 (H) 1.8 - 8.0 K/UL    ABS. LYMPHOCYTES 3.3 0.8 - 3.5 K/UL    ABS. MONOCYTES 1.0 0.0 - 1.0 K/UL    ABS. EOSINOPHILS 0.3 0.0 - 0.4 K/UL    ABS.  BASOPHILS 0.0 0.0 - 0.1 K/UL    DF AUTOMATED     METABOLIC PANEL, COMPREHENSIVE    Collection Time: 12/26/17  8:59 PM   Result Value Ref Range    Sodium 140 136 - 145 mmol/L    Potassium 3.6 3.5 - 5.1 mmol/L    Chloride 104 97 - 108 mmol/L    CO2 28 21 - 32 mmol/L    Anion gap 8 5 - 15 mmol/L    Glucose 91 65 - 100 mg/dL    BUN 13 6 - 20 MG/DL    Creatinine 0.77 0.55 - 1.02 MG/DL    BUN/Creatinine ratio 17 12 - 20      GFR est AA >60 >60 ml/min/1.73m2    GFR est non-AA >60 >60 ml/min/1.73m2    Calcium 8.8 8.5 - 10.1 MG/DL Bilirubin, total 0.4 0.2 - 1.0 MG/DL    ALT (SGPT) 17 12 - 78 U/L    AST (SGOT) 12 (L) 15 - 37 U/L    Alk. phosphatase 64 45 - 117 U/L    Protein, total 7.9 6.4 - 8.2 g/dL    Albumin 3.4 (L) 3.5 - 5.0 g/dL    Globulin 4.5 (H) 2.0 - 4.0 g/dL    A-G Ratio 0.8 (L) 1.1 - 2.2     D DIMER    Collection Time: 12/26/17  8:59 PM   Result Value Ref Range    D-dimer 0.44 0.00 - 0.65 mg/L FEU   HCG URINE, QL. - POC    Collection Time: 12/26/17  9:06 PM   Result Value Ref Range    Pregnancy test,urine (POC) NEGATIVE  NEG         Xr Ribs Lt W Pa Cxr Min 3 V    Result Date: 12/26/2017  CLINICAL HISTORY: Cough, left lower chest pain Comparison to 3/21/2017 PA view of the chest and 3 oblique views of the left ribs demonstrate normal heart size. There is no acute process in the lung fields. No fracture or pneumothorax. IMPRESSION: No acute fracture or process. Pleurisy: Care Instructions  Your Care Instructions  Pleurisy is inflammation of the tissue that lines the inside of the chest and covers the lungs (pleura). Pleurisy is often caused by an infection, usually a virus. It also can be caused by other health problems, such as pneumonia or lupus. Pleurisy can cause sharp chest pain that gets worse when you cough or take a deep breath. You may need more tests to find out what is causing your pleurisy. Treatment depends on the cause. Pleurisy may come and go for a few days, or it may continue if the cause has not been treated. Home treatment can help ease symptoms. Follow-up care is a key part of your treatment and safety. Be sure to make and go to all appointments, and call your doctor if you are having problems. It's also a good idea to know your test results and keep a list of the medicines you take. How can you care for yourself at home? · Take an over-the-counter pain medicine, such as acetaminophen (Tylenol), ibuprofen (Advil, Motrin), or naproxen (Aleve). Read and follow all instructions on the label.   · Do not take two or more pain medicines at the same time unless the doctor told you to. Many pain medicines have acetaminophen, which is Tylenol. Too much acetaminophen (Tylenol) can be harmful. · If your doctor prescribed antibiotics, take them as directed. Do not stop taking them just because you feel better. You need to take the full course of antibiotics. · Take cough medicine as directed if your doctor recommends it. · Avoid activities that make the pain worse. When should you call for help? Call 911 anytime you think you may need emergency care. For example, call if:  ? · You have severe trouble breathing. ? · You have severe chest pain. ? · You passed out (lost consciousness). ?Call your doctor now or seek immediate medical care if:  ? · You have a new or higher fever. ? Watch closely for changes in your health, and be sure to contact your doctor if:  ? · You begin to cough up yellow or green mucus. ? · You cough up blood. ? · Your symptoms are not better in 3 or 4 days. Where can you learn more? Go to http://patricia-nan.info/. Enter F346 in the search box to learn more about \"Pleurisy: Care Instructions. \"  Current as of: May 12, 2017  Content Version: 11.4  © 8794-9750 [employer]. Care instructions adapted under license by [employer] (which disclaims liability or warranty for this information). If you have questions about a medical condition or this instruction, always ask your healthcare professional. [place] [address] any warranty or liability for your use of this information. Upper Respiratory Infection (Cold): Care Instructions  Your Care Instructions    An upper respiratory infection, or URI, is an infection of the nose, sinuses, or throat. URIs are spread by coughs, sneezes, and direct contact. The common cold is the most frequent kind of URI. The flu and sinus infections are other kinds of URIs.   Almost all URIs are caused by viruses. Antibiotics won't cure them. But you can treat most infections with home care. This may include drinking lots of fluids and taking over-the-counter pain medicine. You will probably feel better in 4 to 10 days. The doctor has checked you carefully, but problems can develop later. If you notice any problems or new symptoms, get medical treatment right away. Follow-up care is a key part of your treatment and safety. Be sure to make and go to all appointments, and call your doctor if you are having problems. It's also a good idea to know your test results and keep a list of the medicines you take. How can you care for yourself at home? · To prevent dehydration, drink plenty of fluids, enough so that your urine is light yellow or clear like water. Choose water and other caffeine-free clear liquids until you feel better. If you have kidney, heart, or liver disease and have to limit fluids, talk with your doctor before you increase the amount of fluids you drink. · Take an over-the-counter pain medicine, such as acetaminophen (Tylenol), ibuprofen (Advil, Motrin), or naproxen (Aleve). Read and follow all instructions on the label. · Before you use cough and cold medicines, check the label. These medicines may not be safe for young children or for people with certain health problems. · Be careful when taking over-the-counter cold or flu medicines and Tylenol at the same time. Many of these medicines have acetaminophen, which is Tylenol. Read the labels to make sure that you are not taking more than the recommended dose. Too much acetaminophen (Tylenol) can be harmful. · Get plenty of rest.  · Do not smoke or allow others to smoke around you. If you need help quitting, talk to your doctor about stop-smoking programs and medicines. These can increase your chances of quitting for good. When should you call for help? Call 911 anytime you think you may need emergency care.  For example, call if:  ? · You have severe trouble breathing. ?Call your doctor now or seek immediate medical care if:  ? · You seem to be getting much sicker. ? · You have new or worse trouble breathing. ? · You have a new or higher fever. ? · You have a new rash. ? Watch closely for changes in your health, and be sure to contact your doctor if:  ? · You have a new symptom, such as a sore throat, an earache, or sinus pain. ? · You cough more deeply or more often, especially if you notice more mucus or a change in the color of your mucus. ? · You do not get better as expected. Where can you learn more? Go to http://patrciia-nan.info/. Enter A049 in the search box to learn more about \"Upper Respiratory Infection (Cold): Care Instructions. \"  Current as of: May 12, 2017  Content Version: 11.4  © 5608-1376 [employer]. Care instructions adapted under license by [employer] (which disclaims liability or warranty for this information). If you have questions about a medical condition or this instruction, always ask your healthcare professional. [place] [address] any warranty or liability for your use of this information.

## 2017-12-28 LAB
BACTERIA SPEC CULT: NORMAL
CC UR VC: NORMAL
SERVICE CMNT-IMP: NORMAL

## 2018-03-01 ENCOUNTER — OFFICE VISIT (OUTPATIENT)
Dept: FAMILY MEDICINE CLINIC | Age: 38
End: 2018-03-01

## 2018-03-01 VITALS
SYSTOLIC BLOOD PRESSURE: 141 MMHG | OXYGEN SATURATION: 98 % | WEIGHT: 293 LBS | DIASTOLIC BLOOD PRESSURE: 84 MMHG | HEIGHT: 67 IN | RESPIRATION RATE: 18 BRPM | BODY MASS INDEX: 45.99 KG/M2 | HEART RATE: 97 BPM | TEMPERATURE: 98.8 F

## 2018-03-01 DIAGNOSIS — I10 ESSENTIAL HYPERTENSION WITH GOAL BLOOD PRESSURE LESS THAN 140/90: ICD-10-CM

## 2018-03-01 DIAGNOSIS — E66.01 MORBID OBESITY WITH BMI OF 45.0-49.9, ADULT (HCC): Primary | ICD-10-CM

## 2018-03-01 RX ORDER — PHENTERMINE HYDROCHLORIDE 37.5 MG/1
37.5 TABLET ORAL DAILY
Qty: 30 TAB | Refills: 2 | Status: SHIPPED | OUTPATIENT
Start: 2018-03-01 | End: 2018-06-08 | Stop reason: SDUPTHER

## 2018-03-01 NOTE — PROGRESS NOTES
Chief Complaint   Patient presents with    Anxiety    Allergies    Weight Management    Medication Evaluation     Patient in office today for f/u and med check. Would like to discuss weight loss options. Pt exercise 5 days out the week from 7-9, 3 meals a day no snack. Pt has tried hydroxycut in the past.    1. Have you been to the ER, urgent care clinic since your last visit? Hospitalized since your last visit? No    2. Have you seen or consulted any other health care providers outside of the [address] [place] [address] since your last visit? Include any pap smears or colon screening.  No

## 2018-03-01 NOTE — MR AVS SNAPSHOT
[address] [id number] [phone] Patient: Jeannette Mohan MRN: [id number] Visit Information Date & Time Provider Department Dept. Phone Encounter #  
 3/1/2018  2:15 PM Johanna Sarabia NP [address] [facility] [phone] [id number] Follow-up Instructions Return in about 3 months (around 6/1/2018) for follow up. Upcoming Health Maintenance Date Due Influenza Age 5 to Adult 8/1/2017 PAP AKA CERVICAL CYTOLOGY 3/31/2020 DTaP/Tdap/Td series (2 - Td) 3/31/2027 Allergies as of 3/1/2018  Review Complete On: 3/1/2018 By: Johanna Sarabia NP Severity Noted Reaction Type Reactions Bactrim [Sulfamethoprim Ds]  12/16/2013    Hives Current Immunizations  Reviewed on 1/21/2016 No immunizations on file. Not reviewed this visit You Were Diagnosed With   
  
 Codes Comments Morbid obesity with BMI of 45.0-49.9, adult [facility]    -  Primary ICD-10-CM: E66.01, Z68.42 
ICD-9-CM: 278.01, V85.42 Essential hypertension with goal blood pressure less than 140/90     ICD-10-CM: I10 
ICD-9-CM: 401.9 Vitals BP Pulse Temp Resp Height(growth percentile) Weight(growth percentile) 157/82 (BP 1 Location: Right arm, BP Patient Position: Sitting) 97 98.8 °F (37.1 °C) (Oral) 18 5' 7\" (1.702 m) 315 lb (142.9 kg) LMP SpO2 BMI OB Status Smoking Status 02/01/2018 98% 49.34 kg/m2 Having regular periods Former Smoker Vitals History BMI and BSA Data Body Mass Index Body Surface Area  
 49.34 kg/m 2 2.6 m 2 Preferred Pharmacy Pharmacy Name Phone [id number], [address] [facility] [phone] Your Updated Medication List  
  
   
This list is accurate as of 3/1/18  2:43 PM.  Always use your most recent med list.  
  
  
  
  
 albuterol 90 mcg/actuation inhaler Commonly known as:  VENTOLIN HFA  
 Take 1 Puff by inhalation every four (4) hours as needed for Wheezing. amLODIPine 5 mg tablet Commonly known as:  Ami Matt Take 1 Tab by mouth daily. ammonium lactate 12 % topical cream  
Commonly known as:  LAC-HYDRIN Apply  to affected area two (2) times a day. rub in to affected area well  
  
 clonazePAM 1 mg tablet Commonly known as:  Yaz Perry Take 1 Tab by mouth daily as needed. - must last 30 days. phentermine 37.5 mg tablet Commonly known as:  ADIPEX-P Take 1 Tab by mouth daily. Max Daily Amount: 37.5 mg.  
  
  
  
  
Prescriptions Printed Refills  
 phentermine (ADIPEX-P) 37.5 mg tablet 2 Sig: Take 1 Tab by mouth daily. Max Daily Amount: 37.5 mg.  
 Class: Print Route: Oral  
  
Follow-up Instructions Return in about 3 months (around 6/1/2018) for follow up. Patient Instructions Phentermine (By mouth) Phentermine (FEN-ter-meen) Helps you lose weight when used for a short time. Brand Name(s): Kerry Flores There may be other brand names for this medicine. When This Medicine Should Not Be Used: This medicine is not right for everyone. Do not use it if you had an allergic reaction to phentermine or similar medicines, or if you are pregnant. How to Use This Medicine:  
Dissolving Tablet, Capsule, Long Acting Capsule, Tablet, Dissolving Tablet · Your doctor will tell you how much medicine to use. Do not use more than directed. · This medicine is not for long-term use. · To avoid trouble sleeping, always take this medicine in the morning and never at bedtime or late in the evening. ¨ Take the capsule 2 hours after breakfast. 
¨ Take the extended-release capsule before breakfast. 
¨ Take the disintegrating tablet in the morning, with or without food. ¨ Take the phentermine tablet before breakfast or 1 to 2 hours after breakfast. 
¨ Take Lomaira tablet 30 minutes before meals. · Swallow the extended-release capsule whole. Do not crush, break, or chew it. · If you are using the disintegrating tablet, make sure your hands are dry before you handle the tablet. Place the tablet on your tongue. It should melt quickly. After the tablet has melted, swallow or take a drink of water. · Tablet: Swallow whole. Do not crush, break, or chew it. · Carefully follow your doctor's instructions about any special diet. · Missed dose: Take a dose as soon as you remember. If it is almost time for your next dose, wait until then and take a regular dose. Do not take extra medicine to make up for a missed dose. · Store the medicine in a closed container at room temperature, away from heat, moisture, and direct light. Drugs and Foods to Avoid: Ask your doctor or pharmacist before using any other medicine, including over-the-counter medicines, vitamins, and herbal products. · Do not use this medicine and an MAO inhibitor (MAOI) within 14 days of each other. · Some medicines can affect how phentermine works. Tell your doctor if you are using any of the following: ¨ Amphetamine medicine (including dextroamphetamine, methamphetamine) ¨ Diet pills ¨ Insulin or diabetes medicine ¨ Medicine to treat depression (including fluoxetine, fluvoxamine, paroxetine, sertraline) · Do not drink alcohol while you are using this medicine. Warnings While Using This Medicine: · It is not safe to take this medicine during pregnancy. It could harm an unborn baby. Tell your doctor right away if you become pregnant. · Tell your doctor if you are breastfeeding, or if you have kidney disease, diabetes, glaucoma, congestive heart failure, heart or blood vessel disease, high blood pressure, overactive thyroid, or a history of stroke or drug abuse. Tell your doctor if have allergies to aspirin or tartrazine. · This medicine may cause the following problems: 
¨ Primary pulmonary hypertension (a serious lung problem) ¨ Heart valve disease ¨ Changes in blood sugar levels · This medicine may make you dizzy or drowsy. Do not drive or do anything else that could be dangerous until you know how this medicine affects you. · This medicine can be habit-forming. Do not use more than your prescribed dose. Call your doctor if you think your medicine is not working. · Do not stop using this medicine suddenly. Your doctor will need to slowly decrease your dose before you stop it completely. · Your doctor will check your progress and the effects of this medicine at regular visits. Keep all appointments. · Keep all medicine out of the reach of children. Never share your medicine with anyone. Possible Side Effects While Using This Medicine:  
Call your doctor right away if you notice any of these side effects: · Allergic reaction: Itching or hives, swelling in your face or hands, swelling or tingling in your mouth or throat, chest tightness, trouble breathing · Chest pain, fainting, trouble breathing · Fast, slow, pounding, or uneven heartbeat · Seizures or tremors · Severe headache · Swelling of your feet or lower legs If you notice these less serious side effects, talk with your doctor: · Changes in sex drive · Dizziness, drowsiness, mild headache · Dry mouth or a bad taste in your mouth · Nausea, vomiting, diarrhea, constipation, stomach cramps · Restlessness or nervousness, trouble sleeping If you notice other side effects that you think are caused by this medicine, tell your doctor. Call your doctor for medical advice about side effects. You may report side effects to FDA at 3-674-FDA-2917 © 2017 [facility] Information is for End User's use only and may not be sold, redistributed or otherwise used for commercial purposes. The above information is an  only. It is not intended as medical advice for individual conditions or treatments.  Talk to your doctor, nurse or pharmacist before following any medical regimen to see if it is safe and effective for you. Learning About Bariatric Surgery What is bariatric surgery? Bariatric surgery is surgery to help you lose weight. This type of surgery is only used for people who are very overweight and have not been able to lose weight with diet and exercise. This surgery makes the stomach smaller. Some types of surgery also change the connection between your stomach and intestines. How is bariatric surgery done? Bariatric surgery may be either \"open\" or \"laparoscopic. \" Open surgery is done through a large cut (incision) in the belly. Laparoscopic surgery is done through several small cuts. The doctor puts a lighted tube, or scope, and other surgical tools through small cuts in your belly. The doctor is able to see your organs with the scope. There are different types of bariatric surgery. Gastric sleeve surgery The surgery is usually done through several small incisions in the belly. The doctor removes more than half of your stomach. This leaves a thin sleeve, or tube, that is about the size of a banana. Because part of your stomach has been removed, this can't be reversed. Julito-en-Y gastric bypass surgery Julito-en-Y (say \"marcelle-en-why\") surgery changes the connection between the stomach and the intestines. The doctor separates a section of your stomach from the rest of your stomach. This makes a small pouch. The new pouch will hold the food you eat. The doctor connects the stomach pouch to the middle part of the small intestine. Gastric banding surgery The surgery is usually done through several small incisions in the belly. The doctor wraps a band around the upper part of the stomach. This creates a small pouch. The small size of the pouch means that you will get full after you eat just a small amount of food.  The doctor can inflate or deflate the band to adjust the size. This lets the doctor adjust how quickly food passes from the new pouch into the stomach. It does not change the connection between the stomach and the intestines. What can you expect after the surgery? You may stay in the hospital for one or more days after the surgery. How long you stay depends on the type of surgery you had. Most people need 2 to 4 weeks before they are ready to get back to their usual routine. For the first 2 to 6 weeks after surgery, you probably will need to follow a liquid or soft diet. Bit by bit, you will be able to eat more solid foods. Your doctor may advise you to work with a dietitian. This way you'll be sure to get enough protein, vitamins, and minerals while you are losing weight. Even with a healthy diet, you may need to take vitamin and mineral supplements. After surgery, you will not be able to eat very much at one time. You will get full quickly. Try not to eat too much at one time or eat foods that are high in fat or sugar. If you do, you may vomit, get stomach pain, or have diarrhea. You probably will lose weight very quickly in the first few months after surgery. As time goes on, your weight loss will slow down. You will have regular doctor visits to check how you are doing. Think of bariatric surgery as a tool to help you lose weight. It isn't an instant fix. You will still need to eat a healthy diet and get regular exercise. This will help you reach your weight goal and avoid regaining the weight you lose. Follow-up care is a key part of your treatment and safety. Be sure to make and go to all appointments, and call your doctor if you are having problems. It's also a good idea to know your test results and keep a list of the medicines you take. Where can you learn more? Go to http://patricia-nan.info/. Enter G469 in the search box to learn more about \"Learning About Bariatric Surgery. \" 
 Current as of: October 13, 2016 Content Version: 11.4 © 0911-7595 [employer], [employer]. Care instructions adapted under license by [employer] (which disclaims liability or warranty for this information). If you have questions about a medical condition or this instruction, always ask your healthcare professional. [address] any warranty or liability for your use of this information. Introducing [facility]! Kaiden Rodriguez introduces [employer] patient portal. Now you can access parts of your medical record, email your doctor's office, and request medication refills online. 1. In your internet browser, go to [email or web address] 2. Click on the First Time User? Click Here link in the Sign In box. You will see the New Member Sign Up page. 3. Enter your [employer] Access Code exactly as it appears below. You will not need to use this code after youve completed the sign-up process. If you do not sign up before the expiration date, you must request a new code. · [employer] Access Code: [email or web address] Expires: 3/26/2018 10:07 PM 
 
4. Enter the last four digits of your Social Security Number (xxxx) and Date of Birth (mm/dd/yyyy) as indicated and click Submit. You will be taken to the next sign-up page. 5. Create a [employer] ID. This will be your [employer] login ID and cannot be changed, so think of one that is secure and easy to remember. 6. Create a [employer] password. You can change your password at any time. 7. Enter your Password Reset Question and Answer. This can be used at a later time if you forget your password. 8. Enter your e-mail address. You will receive e-mail notification when new information is available in [address] 9. Click Sign Up. You can now view and download portions of your medical record. 10. Click the Download Summary menu link to download a portable copy of your medical information. If you have questions, please visit the Frequently Asked Questions section of the [employer] website. Remember, [employer] is NOT to be used for urgent needs. For medical emergencies, dial 911. Now available from your iPhone and Android! Please provide this summary of care documentation to your next provider. Your primary care clinician is listed as Marcia Blake. If you have any questions after today's visit, please call [phone].

## 2018-03-01 NOTE — PROGRESS NOTES
Chief Complaint   Patient presents with    Anxiety    Allergies    Weight Management    Medication Evaluation     Patient in office today for f/u and med check. Would like to discuss weight loss options. Pt exercise 5 days out the week from 7-9, 3 meals a day no snack. Pt has tried hydroxycut in the past.  Has lost 5 lbs since last OV. Pt does not currently have health insurance. Plans on getting insurance for her job in June. Pt did not take her Norvasc last night. BP is elevated today. Has been taking daily, just forgot. Denies any other concerns at this time. Chief Complaint   Patient presents with    Anxiety    Allergies    Weight Management    Medication Evaluation     she is a 43y.o. year old female who presents for evalution. Reviewed PmHx, RxHx, FmHx, SocHx, AllgHx and updated and dated in the chart. Review of Systems - negative except as listed above in the HPI    Objective:     Vitals:    03/01/18 1417 03/01/18 1451   BP: 157/82 141/84   Pulse: 97    Resp: 18    Temp: 98.8 °F (37.1 °C)    TempSrc: Oral    SpO2: 98%    Weight: 315 lb (142.9 kg)    Height: 5' 7\" (1.702 m)      Physical Examination: General appearance - alert, well appearing, and in no distress; overweight  Eyes - pupils equal and reactive, extraocular eye movements intact  Ears - bilateral TM's and external ear canals normal  Nose - normal and patent, no erythema, discharge or polyps and normal nontender sinuses  Mouth - mucous membranes moist, pharynx normal without lesions  Neck - supple, no significant adenopathy  Chest - clear to auscultation, no wheezes, rales or rhonchi, symmetric air entry  Heart - normal rate, regular rhythm, normal S1, S2, no murmurs    Assessment/ Plan:   Diagnoses and all orders for this visit:    1. Morbid obesity with BMI of 45.0-49.9, adult (HCC)  -     phentermine (ADIPEX-P) 37.5 mg tablet; Take 1 Tab by mouth daily. Max Daily Amount: 37.5 mg.  Start adipex daily. Reviewed SEs/ADRs of medication and risks associated with long term use. Recommended only taking medication for 3 months total to help facilitate weight loss. Enc to continue with efforts to follow a healthy diet and exercise routinely. Follow up in 3 months to recheck weight. Recommended pt consider bariatric surgery in the future when she has insurance again. Provided with a list of options to consider. 2. Essential hypertension with goal blood pressure less than 140/90  Reinforced importance of compliance with medication. Follow-up Disposition:  Return in about 3 months (around 6/1/2018) for follow up. I have discussed the diagnosis with the patient and the intended plan as seen in the above orders. The patient has received an after-visit summary and questions were answered concerning future plans. Medication Side Effects and Warnings were discussed with patient: yes  Patient Labs were reviewed and or requested: yes  Patient Past Records were reviewed and or requested  yes  Patient / Caregiver Understanding of treatment plan was verbalized during office visit YES    AUSTIN Chan    Patient Instructions   Phentermine (By mouth)   Phentermine (FEN-ter-meen)  Helps you lose weight when used for a short time. Brand Name(s): Adipex-P, Lomaira   There may be other brand names for this medicine. When This Medicine Should Not Be Used: This medicine is not right for everyone. Do not use it if you had an allergic reaction to phentermine or similar medicines, or if you are pregnant. How to Use This Medicine:   Dissolving Tablet, Capsule, Long Acting Capsule, Tablet, Dissolving Tablet  · Your doctor will tell you how much medicine to use. Do not use more than directed. · This medicine is not for long-term use. · To avoid trouble sleeping, always take this medicine in the morning and never at bedtime or late in the evening.    ¨ Take the capsule 2 hours after breakfast.  ¨ Take the extended-release capsule before breakfast.  ¨ Take the disintegrating tablet in the morning, with or without food. ¨ Take the phentermine tablet before breakfast or 1 to 2 hours after breakfast.  ¨ Take Lomaira tablet 30 minutes before meals. · Swallow the extended-release capsule whole. Do not crush, break, or chew it. · If you are using the disintegrating tablet, make sure your hands are dry before you handle the tablet. Place the tablet on your tongue. It should melt quickly. After the tablet has melted, swallow or take a drink of water. · Tablet: Swallow whole. Do not crush, break, or chew it. · Carefully follow your doctor's instructions about any special diet. · Missed dose: Take a dose as soon as you remember. If it is almost time for your next dose, wait until then and take a regular dose. Do not take extra medicine to make up for a missed dose. · Store the medicine in a closed container at room temperature, away from heat, moisture, and direct light. Drugs and Foods to Avoid:   Ask your doctor or pharmacist before using any other medicine, including over-the-counter medicines, vitamins, and herbal products. · Do not use this medicine and an MAO inhibitor (MAOI) within 14 days of each other. · Some medicines can affect how phentermine works. Tell your doctor if you are using any of the following:  ¨ Amphetamine medicine (including dextroamphetamine, methamphetamine)  ¨ Diet pills  ¨ Insulin or diabetes medicine  ¨ Medicine to treat depression (including fluoxetine, fluvoxamine, paroxetine, sertraline)  · Do not drink alcohol while you are using this medicine. Warnings While Using This Medicine:   · It is not safe to take this medicine during pregnancy. It could harm an unborn baby. Tell your doctor right away if you become pregnant.   · Tell your doctor if you are breastfeeding, or if you have kidney disease, diabetes, glaucoma, congestive heart failure, heart or blood vessel disease, high blood pressure, overactive thyroid, or a history of stroke or drug abuse. Tell your doctor if have allergies to aspirin or tartrazine. · This medicine may cause the following problems:  ¨ Primary pulmonary hypertension (a serious lung problem)  ¨ Heart valve disease  ¨ Changes in blood sugar levels  · This medicine may make you dizzy or drowsy. Do not drive or do anything else that could be dangerous until you know how this medicine affects you. · This medicine can be habit-forming. Do not use more than your prescribed dose. Call your doctor if you think your medicine is not working. · Do not stop using this medicine suddenly. Your doctor will need to slowly decrease your dose before you stop it completely. · Your doctor will check your progress and the effects of this medicine at regular visits. Keep all appointments. · Keep all medicine out of the reach of children. Never share your medicine with anyone. Possible Side Effects While Using This Medicine:   Call your doctor right away if you notice any of these side effects:  · Allergic reaction: Itching or hives, swelling in your face or hands, swelling or tingling in your mouth or throat, chest tightness, trouble breathing  · Chest pain, fainting, trouble breathing  · Fast, slow, pounding, or uneven heartbeat  · Seizures or tremors  · Severe headache  · Swelling of your feet or lower legs  If you notice these less serious side effects, talk with your doctor:   · Changes in sex drive  · Dizziness, drowsiness, mild headache  · Dry mouth or a bad taste in your mouth  · Nausea, vomiting, diarrhea, constipation, stomach cramps  · Restlessness or nervousness, trouble sleeping  If you notice other side effects that you think are caused by this medicine, tell your doctor. Call your doctor for medical advice about side effects.  You may report side effects to FDA at 4-637-FDA-4592  © 2017 [address] [employer] Information is for End User's use only and may not be sold, redistributed or otherwise used for commercial purposes. The above information is an  only. It is not intended as medical advice for individual conditions or treatments. Talk to your doctor, nurse or pharmacist before following any medical regimen to see if it is safe and effective for you. Learning About Bariatric Surgery  What is bariatric surgery? Bariatric surgery is surgery to help you lose weight. This type of surgery is only used for people who are very overweight and have not been able to lose weight with diet and exercise. This surgery makes the stomach smaller. Some types of surgery also change the connection between your stomach and intestines. How is bariatric surgery done? Bariatric surgery may be either \"open\" or \"laparoscopic. \" Open surgery is done through a large cut (incision) in the belly. Laparoscopic surgery is done through several small cuts. The doctor puts a lighted tube, or scope, and other surgical tools through small cuts in your belly. The doctor is able to see your organs with the scope. There are different types of bariatric surgery. Gastric sleeve surgery  The surgery is usually done through several small incisions in the belly. The doctor removes more than half of your stomach. This leaves a thin sleeve, or tube, that is about the size of a banana. Because part of your stomach has been removed, this can't be reversed. Julito-en-Y gastric bypass surgery  Julito-en-Y (say \"marcelle-en-why\") surgery changes the connection between the stomach and the intestines. The doctor separates a section of your stomach from the rest of your stomach. This makes a small pouch. The new pouch will hold the food you eat. The doctor connects the stomach pouch to the middle part of the small intestine. Gastric banding surgery  The surgery is usually done through several small incisions in the belly. The doctor wraps a band around the upper part of the stomach.  This creates a small pouch. The small size of the pouch means that you will get full after you eat just a small amount of food. The doctor can inflate or deflate the band to adjust the size. This lets the doctor adjust how quickly food passes from the new pouch into the stomach. It does not change the connection between the stomach and the intestines. What can you expect after the surgery? You may stay in the hospital for one or more days after the surgery. How long you stay depends on the type of surgery you had. Most people need 2 to 4 weeks before they are ready to get back to their usual routine. For the first 2 to 6 weeks after surgery, you probably will need to follow a liquid or soft diet. Bit by bit, you will be able to eat more solid foods. Your doctor may advise you to work with a dietitian. This way you'll be sure to get enough protein, vitamins, and minerals while you are losing weight. Even with a healthy diet, you may need to take vitamin and mineral supplements. After surgery, you will not be able to eat very much at one time. You will get full quickly. Try not to eat too much at one time or eat foods that are high in fat or sugar. If you do, you may vomit, get stomach pain, or have diarrhea. You probably will lose weight very quickly in the first few months after surgery. As time goes on, your weight loss will slow down. You will have regular doctor visits to check how you are doing. Think of bariatric surgery as a tool to help you lose weight. It isn't an instant fix. You will still need to eat a healthy diet and get regular exercise. This will help you reach your weight goal and avoid regaining the weight you lose. Follow-up care is a key part of your treatment and safety. Be sure to make and go to all appointments, and call your doctor if you are having problems. It's also a good idea to know your test results and keep a list of the medicines you take. Where can you learn more?   Go to http://patricia-nan.info/. Enter G469 in the search box to learn more about \"Learning About Bariatric Surgery. \"  Current as of: October 13, 2016  Content Version: 11.4  © 9217-7119 Healthwise, Incorporated. Care instructions adapted under license by [employer] (which disclaims liability or warranty for this information). If you have questions about a medical condition or this instruction, always ask your healthcare professional. [place] [address] any warranty or liability for your use of this information.

## 2018-03-01 NOTE — PATIENT INSTRUCTIONS
Phentermine (By mouth)   Phentermine (FEN-ter-meen)  Helps you lose weight when used for a short time. Brand Name(s): Adipex-P, Lomaira   There may be other brand names for this medicine. When This Medicine Should Not Be Used: This medicine is not right for everyone. Do not use it if you had an allergic reaction to phentermine or similar medicines, or if you are pregnant. How to Use This Medicine:   Dissolving Tablet, Capsule, Long Acting Capsule, Tablet, Dissolving Tablet  · Your doctor will tell you how much medicine to use. Do not use more than directed. · This medicine is not for long-term use. · To avoid trouble sleeping, always take this medicine in the morning and never at bedtime or late in the evening. ¨ Take the capsule 2 hours after breakfast.  ¨ Take the extended-release capsule before breakfast.  ¨ Take the disintegrating tablet in the morning, with or without food. ¨ Take the phentermine tablet before breakfast or 1 to 2 hours after breakfast.  ¨ Take Lomaira tablet 30 minutes before meals. · Swallow the extended-release capsule whole. Do not crush, break, or chew it. · If you are using the disintegrating tablet, make sure your hands are dry before you handle the tablet. Place the tablet on your tongue. It should melt quickly. After the tablet has melted, swallow or take a drink of water. · Tablet: Swallow whole. Do not crush, break, or chew it. · Carefully follow your doctor's instructions about any special diet. · Missed dose: Take a dose as soon as you remember. If it is almost time for your next dose, wait until then and take a regular dose. Do not take extra medicine to make up for a missed dose. · Store the medicine in a closed container at room temperature, away from heat, moisture, and direct light. Drugs and Foods to Avoid:   Ask your doctor or pharmacist before using any other medicine, including over-the-counter medicines, vitamins, and herbal products.   · Do not use this medicine and an MAO inhibitor (MAOI) within 14 days of each other. · Some medicines can affect how phentermine works. Tell your doctor if you are using any of the following:  ¨ Amphetamine medicine (including dextroamphetamine, methamphetamine)  ¨ Diet pills  ¨ Insulin or diabetes medicine  ¨ Medicine to treat depression (including fluoxetine, fluvoxamine, paroxetine, sertraline)  · Do not drink alcohol while you are using this medicine. Warnings While Using This Medicine:   · It is not safe to take this medicine during pregnancy. It could harm an unborn baby. Tell your doctor right away if you become pregnant. · Tell your doctor if you are breastfeeding, or if you have kidney disease, diabetes, glaucoma, congestive heart failure, heart or blood vessel disease, high blood pressure, overactive thyroid, or a history of stroke or drug abuse. Tell your doctor if have allergies to aspirin or tartrazine. · This medicine may cause the following problems:  ¨ Primary pulmonary hypertension (a serious lung problem)  ¨ Heart valve disease  ¨ Changes in blood sugar levels  · This medicine may make you dizzy or drowsy. Do not drive or do anything else that could be dangerous until you know how this medicine affects you. · This medicine can be habit-forming. Do not use more than your prescribed dose. Call your doctor if you think your medicine is not working. · Do not stop using this medicine suddenly. Your doctor will need to slowly decrease your dose before you stop it completely. · Your doctor will check your progress and the effects of this medicine at regular visits. Keep all appointments. · Keep all medicine out of the reach of children. Never share your medicine with anyone.   Possible Side Effects While Using This Medicine:   Call your doctor right away if you notice any of these side effects:  · Allergic reaction: Itching or hives, swelling in your face or hands, swelling or tingling in your mouth or throat, chest tightness, trouble breathing  · Chest pain, fainting, trouble breathing  · Fast, slow, pounding, or uneven heartbeat  · Seizures or tremors  · Severe headache  · Swelling of your feet or lower legs  If you notice these less serious side effects, talk with your doctor:   · Changes in sex drive  · Dizziness, drowsiness, mild headache  · Dry mouth or a bad taste in your mouth  · Nausea, vomiting, diarrhea, constipation, stomach cramps  · Restlessness or nervousness, trouble sleeping  If you notice other side effects that you think are caused by this medicine, tell your doctor. Call your doctor for medical advice about side effects. You may report side effects to FDA at 5-796-FDA-0379  © 2017 [address] [employer] Information is for End User's use only and may not be sold, redistributed or otherwise used for commercial purposes. The above information is an  only. It is not intended as medical advice for individual conditions or treatments. Talk to your doctor, nurse or pharmacist before following any medical regimen to see if it is safe and effective for you. Learning About Bariatric Surgery  What is bariatric surgery? Bariatric surgery is surgery to help you lose weight. This type of surgery is only used for people who are very overweight and have not been able to lose weight with diet and exercise. This surgery makes the stomach smaller. Some types of surgery also change the connection between your stomach and intestines. How is bariatric surgery done? Bariatric surgery may be either \"open\" or \"laparoscopic. \" Open surgery is done through a large cut (incision) in the belly. Laparoscopic surgery is done through several small cuts. The doctor puts a lighted tube, or scope, and other surgical tools through small cuts in your belly. The doctor is able to see your organs with the scope. There are different types of bariatric surgery.   Gastric sleeve surgery  The surgery is usually done through several small incisions in the belly. The doctor removes more than half of your stomach. This leaves a thin sleeve, or tube, that is about the size of a banana. Because part of your stomach has been removed, this can't be reversed. Julito-en-Y gastric bypass surgery  Julito-en-Y (say \"marcelle-en-why\") surgery changes the connection between the stomach and the intestines. The doctor separates a section of your stomach from the rest of your stomach. This makes a small pouch. The new pouch will hold the food you eat. The doctor connects the stomach pouch to the middle part of the small intestine. Gastric banding surgery  The surgery is usually done through several small incisions in the belly. The doctor wraps a band around the upper part of the stomach. This creates a small pouch. The small size of the pouch means that you will get full after you eat just a small amount of food. The doctor can inflate or deflate the band to adjust the size. This lets the doctor adjust how quickly food passes from the new pouch into the stomach. It does not change the connection between the stomach and the intestines. What can you expect after the surgery? You may stay in the hospital for one or more days after the surgery. How long you stay depends on the type of surgery you had. Most people need 2 to 4 weeks before they are ready to get back to their usual routine. For the first 2 to 6 weeks after surgery, you probably will need to follow a liquid or soft diet. Bit by bit, you will be able to eat more solid foods. Your doctor may advise you to work with a dietitian. This way you'll be sure to get enough protein, vitamins, and minerals while you are losing weight. Even with a healthy diet, you may need to take vitamin and mineral supplements. After surgery, you will not be able to eat very much at one time. You will get full quickly. Try not to eat too much at one time or eat foods that are high in fat or sugar.  If you do, you may vomit, get stomach pain, or have diarrhea. You probably will lose weight very quickly in the first few months after surgery. As time goes on, your weight loss will slow down. You will have regular doctor visits to check how you are doing. Think of bariatric surgery as a tool to help you lose weight. It isn't an instant fix. You will still need to eat a healthy diet and get regular exercise. This will help you reach your weight goal and avoid regaining the weight you lose. Follow-up care is a key part of your treatment and safety. Be sure to make and go to all appointments, and call your doctor if you are having problems. It's also a good idea to know your test results and keep a list of the medicines you take. Where can you learn more? Go to http://patricia-nan.info/. Enter G469 in the search box to learn more about \"Learning About Bariatric Surgery. \"  Current as of: October 13, 2016  Content Version: 11.4  © 7643-1699 Healthwise, Incorporated. Care instructions adapted under license by [employer] (which disclaims liability or warranty for this information). If you have questions about a medical condition or this instruction, always ask your healthcare professional. [address] any warranty or liability for your use of this information.

## 2018-03-09 ENCOUNTER — DOCUMENTATION ONLY (OUTPATIENT)
Dept: FAMILY MEDICINE CLINIC | Age: 38
End: 2018-03-09

## 2018-03-09 NOTE — PROGRESS NOTES
Patient called asking if she can take Ibuprofen for her cramps while on phentermine. I asked Dewayne Vigil LPN and she informed patient that would be fine.

## 2018-03-20 ENCOUNTER — HOSPITAL ENCOUNTER (EMERGENCY)
Age: 38
Discharge: HOME | End: 2018-03-20
Attending: EMERGENCY MEDICINE
Payer: SELF-PAY

## 2018-03-20 VITALS
RESPIRATION RATE: 18 BRPM | TEMPERATURE: 97.7 F | DIASTOLIC BLOOD PRESSURE: 71 MMHG | BODY MASS INDEX: 45.99 KG/M2 | SYSTOLIC BLOOD PRESSURE: 140 MMHG | HEART RATE: 97 BPM | WEIGHT: 293 LBS | HEIGHT: 67 IN | OXYGEN SATURATION: 100 %

## 2018-03-20 DIAGNOSIS — B34.9 VIRAL ILLNESS: Primary | ICD-10-CM

## 2018-03-20 PROCEDURE — 99282 EMERGENCY DEPT VISIT SF MDM: CPT

## 2018-03-20 RX ORDER — DICLOFENAC SODIUM 50 MG/1
50 TABLET, DELAYED RELEASE ORAL 2 TIMES DAILY
Qty: 20 TAB | Refills: 0 | Status: SHIPPED | OUTPATIENT
Start: 2018-03-20 | End: 2018-06-08

## 2018-03-20 RX ORDER — ONDANSETRON 4 MG/1
4 TABLET, ORALLY DISINTEGRATING ORAL
Qty: 20 TAB | Refills: 0 | Status: SHIPPED | OUTPATIENT
Start: 2018-03-20 | End: 2018-04-09

## 2018-03-20 RX ORDER — LOPERAMIDE HCL 2 MG
2 TABLET ORAL
Qty: 20 TAB | Refills: 0 | Status: SHIPPED | OUTPATIENT
Start: 2018-03-20 | End: 2018-03-25

## 2018-03-20 NOTE — LETTER
[facility] EMERGENCY DEPT 
[address] [place] [id number] 
[phone] Work/School Note Date: 3/20/2018 To Whom It May concern: 
 
Katty Mohr was seen and treated today in the emergency room by the following provider(s): 
Attending Provider: Elieser Paz MD. Katty Mohr should be excused from work on 3/20/2018.  
 
Sincerely, 
 
 
 
 
Elieser Paz MD

## 2018-03-20 NOTE — DISCHARGE INSTRUCTIONS
Viral Infections: Care Instructions  Your Care Instructions    You don't feel well, but it's not clear what's causing it. You may have a viral infection. Viruses cause many illnesses, such as the common cold, influenza, fever, rashes, and the diarrhea, nausea, and vomiting that are often called \"stomach flu. \" You may wonder if antibiotic medicines could make you feel better. But antibiotics only treat infections caused by bacteria. They don't work on viruses. The good news is that viral infections usually aren't serious. Most will go away in a few days without medical treatment. In the meantime, there are a few things you can do to make yourself more comfortable. Follow-up care is a key part of your treatment and safety. Be sure to make and go to all appointments, and call your doctor if you are having problems. It's also a good idea to know your test results and keep a list of the medicines you take. How can you care for yourself at home? · Get plenty of rest if you feel tired. · Take an over-the-counter pain medicine if needed, such as acetaminophen (Tylenol), ibuprofen (Advil, Motrin), or naproxen (Aleve). Read and follow all instructions on the label. · Be careful when taking over-the-counter cold or flu medicines and Tylenol at the same time. Many of these medicines have acetaminophen, which is Tylenol. Read the labels to make sure that you are not taking more than the recommended dose. Too much acetaminophen (Tylenol) can be harmful. · Drink plenty of fluids, enough so that your urine is light yellow or clear like water. If you have kidney, heart, or liver disease and have to limit fluids, talk with your doctor before you increase the amount of fluids you drink. · Stay home from work, school, and other public places while you have a fever. When should you call for help? Call 911 anytime you think you may need emergency care. For example, call if:  ? · You have severe trouble breathing.    ? · You passed out (lost consciousness). ?Call your doctor now or seek immediate medical care if:  ? · You seem to be getting much sicker. ? · You have a new or higher fever. ? · You have blood in your stools. ? · You have new belly pain, or your pain gets worse. ? · You have a new rash. ? Watch closely for changes in your health, and be sure to contact your doctor if:  ? · You start to get better and then get worse. ? · You do not get better as expected. Where can you learn more? Go to http://patricia-nan.info/. Enter R987 in the search box to learn more about \"Viral Infections: Care Instructions. \"  Current as of: March 3, 2017  Content Version: 11.4  © 5894-8024 [employer]. Care instructions adapted under license by [employer] (which disclaims liability or warranty for this information). If you have questions about a medical condition or this instruction, always ask your healthcare professional. [address] any warranty or liability for your use of this information.

## 2018-03-20 NOTE — ED PROVIDER NOTES
HPI Comments: The patient has a one day history of fever, as high as 102, runny nose, earache, headache, cough, dizziness, vomiting and diarrhea. She denies chest pain, shortness of breath, urinary symptoms or other complaints. She has used ibuprofen but no other medications. Patient is a 43 y.o. female presenting with nasal congestion, nausea, and diarrhea. Nasal Congestion   Associated symptoms include headaches. Pertinent negatives include no chest pain, no abdominal pain and no shortness of breath. Nausea    Associated symptoms include a fever, diarrhea, headaches, cough and headaches. Pertinent negatives include no abdominal pain. Diarrhea    Associated symptoms include a fever, diarrhea, nausea, vomiting and headaches. Pertinent negatives include no dysuria, no chest pain and no back pain. Past Medical History:   Diagnosis Date    Anxiety     Asthma     Hypertension        Past Surgical History:   Procedure Laterality Date    HX CHOLECYSTECTOMY      HX CYST REMOVAL      HX TUBAL LIGATION           Family History:   Problem Relation Age of Onset    Hypertension Mother     Hypertension Sister     Cancer Maternal Aunt        Social History     Social History    Marital status: SINGLE     Spouse name: N/A    Number of children: N/A    Years of education: N/A     Occupational History    Not on file. Social History Main Topics    Smoking status: Former Smoker    Smokeless tobacco: Never Used    Alcohol use No    Drug use: No    Sexual activity: Yes     Partners: Male     Birth control/ protection: Condom     Other Topics Concern    Not on file     Social History Narrative         ALLERGIES: Bactrim [sulfamethoprim ds]    Review of Systems   Constitutional: Positive for fever. HENT: Positive for ear pain, rhinorrhea and sore throat. Eyes: Negative for visual disturbance. Respiratory: Positive for cough. Negative for shortness of breath and wheezing.     Cardiovascular: Negative for chest pain and leg swelling. Gastrointestinal: Positive for diarrhea, nausea and vomiting. Negative for abdominal pain. Genitourinary: Negative for dysuria. Musculoskeletal: Negative. Negative for back pain and neck stiffness. Skin: Negative for rash. Neurological: Positive for headaches. Negative for syncope. Psychiatric/Behavioral: Negative for confusion. There were no vitals filed for this visit. Physical Exam   Constitutional: She appears well-developed and well-nourished. No distress. HENT:   Head: Normocephalic. Right Ear: External ear normal.   Left Ear: External ear normal.   Mouth/Throat: Oropharynx is clear and moist. No oropharyngeal exudate. Eyes: Pupils are equal, round, and reactive to light. Neck: Normal range of motion. Neck supple. Cardiovascular: Normal rate and regular rhythm. No murmur heard. Pulmonary/Chest: Effort normal and breath sounds normal. No respiratory distress. Abdominal: Soft. There is no tenderness. Musculoskeletal: Normal range of motion. She exhibits no edema. Lymphadenopathy:     She has no cervical adenopathy. Neurological: She is alert. She has normal strength. No cranial nerve deficit. Skin: Skin is warm and dry. No rash noted. Psychiatric: She has a normal mood and affect. Her behavior is normal.   Nursing note and vitals reviewed. [facility]      ED Course       Procedures    Discussed clinical impression,  and need for followup in 1-2 days if not improving. Patients questions were answered. Discharge instructions given to patient.

## 2018-03-20 NOTE — ED TRIAGE NOTES
Pt ambulated to the treatment area with a steady gait. Pt states \"Yesterday I started with nasal congestion both ears hurting and sinus pain/headache. Today I started having that plus a fever up to 102 nausea and diarrhea. I took ibuprofen 2 hours ago. \" Pt appears in no distress.

## 2018-04-09 ENCOUNTER — HOSPITAL ENCOUNTER (EMERGENCY)
Age: 38
Discharge: LEFT BEFORE DISCHARGE | End: 2018-04-09
Attending: EMERGENCY MEDICINE
Payer: SELF-PAY

## 2018-04-09 ENCOUNTER — HOSPITAL ENCOUNTER (EMERGENCY)
Age: 38
Discharge: HOME | End: 2018-04-09
Attending: STUDENT IN AN ORGANIZED HEALTH CARE EDUCATION/TRAINING PROGRAM
Payer: SELF-PAY

## 2018-04-09 ENCOUNTER — APPOINTMENT (OUTPATIENT)
Dept: GENERAL RADIOLOGY | Age: 38
End: 2018-04-09
Attending: STUDENT IN AN ORGANIZED HEALTH CARE EDUCATION/TRAINING PROGRAM
Payer: SELF-PAY

## 2018-04-09 VITALS
TEMPERATURE: 98.5 F | WEIGHT: 280 LBS | BODY MASS INDEX: 42.44 KG/M2 | HEART RATE: 86 BPM | RESPIRATION RATE: 18 BRPM | OXYGEN SATURATION: 100 % | SYSTOLIC BLOOD PRESSURE: 176 MMHG | HEIGHT: 68 IN | DIASTOLIC BLOOD PRESSURE: 97 MMHG

## 2018-04-09 VITALS
BODY MASS INDEX: 44.41 KG/M2 | DIASTOLIC BLOOD PRESSURE: 83 MMHG | HEIGHT: 68 IN | OXYGEN SATURATION: 100 % | RESPIRATION RATE: 18 BRPM | HEART RATE: 86 BPM | WEIGHT: 293 LBS | TEMPERATURE: 98.1 F | SYSTOLIC BLOOD PRESSURE: 137 MMHG

## 2018-04-09 DIAGNOSIS — S49.91XA INJURY OF RIGHT SHOULDER, INITIAL ENCOUNTER: Primary | ICD-10-CM

## 2018-04-09 PROCEDURE — 73030 X-RAY EXAM OF SHOULDER: CPT

## 2018-04-09 PROCEDURE — 75810000275 HC EMERGENCY DEPT VISIT NO LEVEL OF CARE

## 2018-04-09 PROCEDURE — 96372 THER/PROPH/DIAG INJ SC/IM: CPT

## 2018-04-09 PROCEDURE — 99282 EMERGENCY DEPT VISIT SF MDM: CPT

## 2018-04-09 PROCEDURE — 74011250636 HC RX REV CODE- 250/636: Performed by: STUDENT IN AN ORGANIZED HEALTH CARE EDUCATION/TRAINING PROGRAM

## 2018-04-09 RX ORDER — KETOROLAC TROMETHAMINE 30 MG/ML
30 INJECTION, SOLUTION INTRAMUSCULAR; INTRAVENOUS
Status: COMPLETED | OUTPATIENT
Start: 2018-04-09 | End: 2018-04-09

## 2018-04-09 RX ADMIN — KETOROLAC TROMETHAMINE 30 MG: 30 INJECTION, SOLUTION INTRAMUSCULAR; INTRAVENOUS at 18:36

## 2018-04-09 NOTE — ED NOTES
Patient medicated with ordered IM toradol. Patient tolerated injection well. Family remains at bedside. Patient has no further requests or concerns at this time. Call bell in reach.

## 2018-04-09 NOTE — ED TRIAGE NOTES
Patient presents ambulatory to treatment area with a steady gait. Patient states she fell last night, injuring her right shoulder. Patient has limited movement to the extremity; no deformity noted. Patient states she is unable to lift the arm up.

## 2018-04-09 NOTE — ED NOTES
The patient was discharged home by Dr. Fermín Gibson in stable condition. The patient is alert and oriented, in no respiratory distress and discharge vital signs obtained. The patient's diagnosis, condition and treatment were explained. The patient expressed understanding. No prescriptions given. Work note given. A discharge plan has been developed. A  was not involved in the process. Aftercare instructions were given. Pt ambulatory out of the ED.

## 2018-04-09 NOTE — DISCHARGE INSTRUCTIONS
Rotator Cuff: Exercises  Your Care Instructions  Here are some examples of typical rehabilitation exercises for your condition. Start each exercise slowly. Ease off the exercise if you start to have pain. Your doctor or physical therapist will tell you when you can start these exercises and which ones will work best for you. How to do the exercises  Pendulum swing    If you have pain in your back, do not do this exercise. 1. Hold on to a table or the back of a chair with your good arm. Then bend forward a little and let your sore arm hang straight down. This exercise does not use the arm muscles. Rather, use your legs and your hips to create movement that makes your arm swing freely. 2. Use the movement from your hips and legs to guide the slightly swinging arm back and forth like a pendulum (or elephant trunk). Then guide it in circles that start small (about the size of a dinner plate). Make the circles a bit larger each day, as your pain allows. 3. Do this exercise for 5 minutes, 5 to 7 times each day. 4. As you have less pain, try bending over a little farther to do this exercise. This will increase the amount of movement at your shoulder. Posterior stretching exercise    1. Hold the elbow of your injured arm with your other hand. 2. Use your hand to pull your injured arm gently up and across your body. You will feel a gentle stretch across the back of your injured shoulder. 3. Hold for at least 15 to 30 seconds. Then slowly lower your arm. 4. Repeat 2 to 4 times. Up-the-back stretch    Your doctor or physical therapist may want you to wait to do this stretch until you have regained most of your range of motion and strength. You can do this stretch in different ways. Hold any of these stretches for at least 15 to 30 seconds. Repeat them 2 to 4 times. 1. Put your hand in your back pocket. Let it rest there to stretch your shoulder.   2. With your other hand, hold your injured arm (palm outward) behind your back by the wrist. Pull your arm up gently to stretch your shoulder. 3. Next, put a towel over your other shoulder. Put the hand of your injured arm behind your back. Now hold the back end of the towel. With the other hand, hold the front end of the towel in front of your body. Pull gently on the front end of the towel. This will bring your hand farther up your back to stretch your shoulder. Overhead stretch    1. Standing about an arm's length away, grasp onto a solid surface. You could use a countertop, a doorknob, or the back of a sturdy chair. 2. With your knees slightly bent, bend forward with your arms straight. Lower your upper body, and let your shoulders stretch. 3. As your shoulders are able to stretch farther, you may need to take a step or two backward. 4. Hold for at least 15 to 30 seconds. Then stand up and relax. If you had stepped back during your stretch, step forward so you can keep your hands on the solid surface. 5. Repeat 2 to 4 times. Shoulder flexion (lying down)    To make a wand for this exercise, use a piece of PVC pipe or a broom handle with the broom removed. Make the wand about a foot wider than your shoulders. 1. Lie on your back, holding a wand with both hands. Your palms should face down as you hold the wand. 2. Keeping your elbows straight, slowly raise your arms over your head. Raise them until you feel a stretch in your shoulders, upper back, and chest.  3. Hold for 15 to 30 seconds. 4. Repeat 2 to 4 times. Shoulder rotation (lying down)    To make a wand for this exercise, use a piece of PVC pipe or a broom handle with the broom removed. Make the wand about a foot wider than your shoulders. 1. Lie on your back. Hold a wand with both hands with your elbows bent and palms up. 2. Keep your elbows close to your body, and move the wand across your body toward the sore arm. 3. Hold for 8 to 12 seconds. 4. Repeat 2 to 4 times.   Wall climbing (to the side)    Avoid any movement that is straight to your side, and be careful not to arch your back. Your arm should stay about 30 degrees to the front of your side. 1. Stand with your side to a wall so that your fingers can just touch it at an angle about 30 degrees toward the front of your body. 2. Walk the fingers of your injured arm up the wall as high as pain permits. Try not to shrug your shoulder up toward your ear as you move your arm up. 3. Hold that position for a count of at least 15 to 20.  4. Walk your fingers back down to the starting position. 5. Repeat at least 2 to 4 times. Try to reach higher each time. Wall climbing (to the front)    During this stretching exercise, be careful not to arch your back. 1. Face a wall, and stand so your fingers can just touch it. 2. Keeping your shoulder down, walk the fingers of your injured arm up the wall as high as pain permits. (Don't shrug your shoulder up toward your ear.)  3. Hold your arm in that position for at least 15 to 30 seconds. 4. Slowly walk your fingers back down to where you started. 5. Repeat at least 2 to 4 times. Try to reach higher each time. Shoulder blade squeeze    1. Stand with your arms at your sides, and squeeze your shoulder blades together. Do not raise your shoulders up as you squeeze. 2. Hold 6 seconds. 3. Repeat 8 to 12 times. Scapular exercise: Arm reach    1. Lie flat on your back. This exercise is a very slight motion that starts with your arms raised (elbows straight, arms straight). 2. From this position, reach higher toward the fede or ceiling. Keep your elbows straight. All motion should be from your shoulder blade only. 3. Relax your arms back to where you started. 4. Repeat 8 to 12 times. Arm raise to the side    During this strengthening exercise, your arm should stay about 30 degrees to the front of your side. 1. Slowly raise your injured arm to the side, with your thumb facing up.  Raise your arm 60 degrees at the most (shoulder level is 90 degrees). 2. Hold the position for 3 to 5 seconds. Then lower your arm back to your side. If you need to, bring your \"good\" arm across your body and place it under the elbow as you lower your injured arm. Use your good arm to keep your injured arm from dropping down too fast.  3. Repeat 8 to 12 times. 4. When you first start out, don't hold any extra weight in your hand. As you get stronger, you may use a 1-pound to 2-pound dumbbell or a small can of food. Shoulder flexor and extensor exercise    These are isometric exercises. That means you contract your muscles without actually moving. 1. Push forward (flex): Stand facing a wall or doorjamb, about 6 inches or less back. Hold your injured arm against your body. Make a closed fist with your thumb on top. Then gently push your hand forward into the wall with about 25% to 50% of your strength. Don't let your body move backward as you push. Hold for about 6 seconds. Relax for a few seconds. Repeat 8 to 12 times. 2. Push backward (extend): Stand with your back flat against a wall. Your upper arm should be against the wall, with your elbow bent 90 degrees (your hand straight ahead). Push your elbow gently back against the wall with about 25% to 50% of your strength. Don't let your body move forward as you push. Hold for about 6 seconds. Relax for a few seconds. Repeat 8 to 12 times. Scapular exercise: Wall push-ups    This exercise is best done with your fingers somewhat turned out, rather than straight up and down. 1. Stand facing a wall, about 12 inches to 18 inches away. 2. Place your hands on the wall at shoulder height. 3. Slowly bend your elbows and bring your face to the wall. Keep your back and hips straight. 4. Push back to where you started. 5. Repeat 8 to 12 times. 6. When you can do this exercise against a wall comfortably, you can try it against a counter.  You can then slowly progress to the end of a couch, then to a sturdy chair, and finally to the floor. Scapular exercise: Retraction    For this exercise, you will need elastic exercise material, such as surgical tubing or Thera-Band. 1. Put the band around a solid object at about waist level. (A bedpost will work well.) Each hand should hold an end of the band. 2. With your elbows at your sides and bent to 90 degrees, pull the band back. Your shoulder blades should move toward each other. Then move your arms back where you started. 3. Repeat 8 to 12 times. 4. If you have good range of motion in your shoulders, try this exercise with your arms lifted out to the sides. Keep your elbows at a 90-degree angle. Raise the elastic band up to about shoulder level. Pull the band back to move your shoulder blades toward each other. Then move your arms back where you started. Internal rotator strengthening exercise    1. Start by tying a piece of elastic exercise material to a doorknob. You can use surgical tubing or Thera-Band. 2. Stand or sit with your shoulder relaxed and your elbow bent 90 degrees. Your upper arm should rest comfortably against your side. Squeeze a rolled towel between your elbow and your body for comfort. This will help keep your arm at your side. 3. Hold one end of the elastic band in the hand of the painful arm. 4. Slowly rotate your forearm toward your body until it touches your belly. Slowly move it back to where you started. 5. Keep your elbow and upper arm firmly tucked against the towel roll or at your side. 6. Repeat 8 to 12 times. External rotator strengthening exercise    1. Start by tying a piece of elastic exercise material to a doorknob. You can use surgical tubing or Thera-Band. (You may also hold one end of the band in each hand.)  2. Stand or sit with your shoulder relaxed and your elbow bent 90 degrees. Your upper arm should rest comfortably against your side. Squeeze a rolled towel between your elbow and your body for comfort.  This will help keep your arm at your side. 3. Hold one end of the elastic band with the hand of the painful arm. 4. Start with your forearm across your belly. Slowly rotate the forearm out away from your body. Keep your elbow and upper arm tucked against the towel roll or the side of your body until you begin to feel tightness in your shoulder. Slowly move your arm back to where you started. 5. Repeat 8 to 12 times. Follow-up care is a key part of your treatment and safety. Be sure to make and go to all appointments, and call your doctor if you are having problems. It's also a good idea to know your test results and keep a list of the medicines you take. Where can you learn more? Go to http://patricia-nan.info/. Enter Jenna Ovalle in the search box to learn more about \"Rotator Cuff: Exercises. \"  Current as of: March 21, 2017  Content Version: 11.4  © 4753-7647 [employer]. Care instructions adapted under license by [employer] (which disclaims liability or warranty for this information). If you have questions about a medical condition or this instruction, always ask your healthcare professional. [place] [address] any warranty or liability for your use of this information. Rotator Cuff Injury: Care Instructions  Your Care Instructions  The rotator cuff is a group of tendons and muscles around the shoulder that keeps the upper arm bone in place. It keeps the shoulder joint stable and allows you to raise and rotate your arm. Damage to the rotator cuff can be caused by overuse, a fall, or a direct blow to the shoulder area, which can tear the tendons. Over time, everyday wear can damage the tendons and make injury more likely. Treatment can depend upon the amount of damage to the tendons. In a younger person, surgery may be the first choice. But if you are older than 25, you likely have some wear on your rotator cuff.  Surgery may not be the most effective treatment. Your doctor may have you try physical therapy and exercise first.  Follow-up care is a key part of your treatment and safety. Be sure to make and go to all appointments, and call your doctor if you are having problems. It's also a good idea to know your test results and keep a list of the medicines you take. How can you care for yourself at home? · Rest your shoulder as much as you can. If your doctor put your arm in a sling or shoulder immobilizer, wear it as directed. Do not take it off before your doctor tells you to. If it is too tight, loosen it. · Be safe with medicines. Read and follow all instructions on the label. ¨ If the doctor gave you a prescription medicine for pain, take it as prescribed. ¨ If you are not taking a prescription pain medicine, ask your doctor if you can take an over-the-counter medicine. · Put ice or a cold pack on your shoulder for 10 to 20 minutes at a time. Try to do this every 1 to 2 hours for the next 3 days (when you are awake). Put a thin cloth between the ice pack and your skin. · After 3 days, put a warm, wet towel on your shoulder. This is to relax the muscles and help blood flow. · While holding a warm, wet towel on your shoulder, lean forward so your arm hangs freely, and gently swing your arm back and forth like a pendulum. You also can do this standing under a warm shower. · Do not do anything that makes your pain worse. · Follow your doctor's advice about whether you need physical therapy. When should you call for help? Call your doctor now or seek immediate medical care if:  ? · You have severe pain. ? · You cannot move your shoulder or arm. ? · You have tingling or numbness in your arm or hand. ? · Your arm or hand is cool or pale. ? Watch closely for changes in your health, and be sure to contact your doctor if:  ? · Your pain gets worse. ? · You have new or worse swelling in your arm or hand. ? · You do not get better as expected. Where can you learn more? Go to http://patricia-nan.info/. Enter 034 [id number] 570 in the search box to learn more about \"Rotator Cuff Injury: Care Instructions. \"  Current as of: March 21, 2017  Content Version: 11.4  © 3587-6948 [employer]. Care instructions adapted under license by [employer] (which disclaims liability or warranty for this information). If you have questions about a medical condition or this instruction, always ask your healthcare professional. [place] [address] any warranty or liability for your use of this information.

## 2018-04-09 NOTE — LETTER
[facility] EMERGENCY DEPT 
[address] [place] [id number] 
[phone] Work/School Note Date: 4/9/2018 To Whom It May concern: 
 
Alex Crews was seen and treated today in the emergency room by the following provider(s): 
Attending Provider: Pooja Quiroz MD. Alex Crews may return to work on 04/10/2018. Sincerely, Teddy Krueger RN

## 2018-04-11 NOTE — ED PROVIDER NOTES
Patient is a 43 y.o. female presenting with shoulder pain. The history is provided by the patient. Shoulder Pain    Pertinent negatives include no numbness. Past Medical History:   Diagnosis Date    Anxiety     Asthma     Hypertension     Rotator cuff tear        Past Surgical History:   Procedure Laterality Date    HX CHOLECYSTECTOMY      HX CYST REMOVAL      HX TUBAL LIGATION           Family History:   Problem Relation Age of Onset    Hypertension Mother     Hypertension Sister     Cancer Maternal Aunt        Social History     Social History    Marital status: SINGLE     Spouse name: N/A    Number of children: N/A    Years of education: N/A     Occupational History    Not on file. Social History Main Topics    Smoking status: Former Smoker    Smokeless tobacco: Never Used    Alcohol use No    Drug use: No    Sexual activity: Yes     Partners: Male     Birth control/ protection: Condom     Other Topics Concern    Not on file     Social History Narrative         ALLERGIES: Bactrim [sulfamethoprim ds]    Review of Systems   Constitutional: Negative for activity change, diaphoresis, fatigue and fever. HENT: Negative for congestion and sore throat. Eyes: Negative for photophobia and visual disturbance. Respiratory: Negative for chest tightness and shortness of breath. Cardiovascular: Negative for chest pain, palpitations and leg swelling. Gastrointestinal: Negative for abdominal pain, blood in stool, constipation, diarrhea, nausea and vomiting. Genitourinary: Negative for difficulty urinating, dysuria, flank pain, frequency and hematuria. Musculoskeletal: Positive for arthralgias and joint swelling. Negative for back pain, gait problem and neck pain. Neurological: Negative for dizziness, syncope, weakness, light-headedness, numbness and headaches.        Vitals:    04/09/18 1816 04/09/18 1903   BP: (!) 134/96 137/83   Pulse: 89 86   Resp: 18 18   Temp: 98.1 °F (36.7 °C)    SpO2: 100% 100%   Weight: 140.6 kg (309 lb 15.5 oz)    Height: 5' 8\" (1.727 m)             Physical Exam   Constitutional: She is oriented to person, place, and time. She appears well-developed and well-nourished. No distress. HENT:   Head: Normocephalic and atraumatic. Nose: Nose normal.   Mouth/Throat: Oropharynx is clear and moist. No oropharyngeal exudate. Eyes: Conjunctivae and EOM are normal. Pupils are equal, round, and reactive to light. Right eye exhibits no discharge. Left eye exhibits no discharge. No scleral icterus. Neck: Normal range of motion. Neck supple. No JVD present. No tracheal deviation present. No thyromegaly present. Cardiovascular: Normal rate, regular rhythm, normal heart sounds and intact distal pulses. Exam reveals no gallop and no friction rub. No murmur heard. Pulmonary/Chest: Effort normal and breath sounds normal. No stridor. No respiratory distress. She has no wheezes. She has no rales. She exhibits no tenderness. Abdominal: Soft. Bowel sounds are normal. She exhibits no distension and no mass. There is no tenderness. There is no rebound. Musculoskeletal: Normal range of motion. She exhibits tenderness (right shoulder tenderness overlying the lateral aspect. unable to abduct active or passive 2/2 pain. No crepitus, ecchymosis, or erythema overlying the joint. PMS intact and equal bilateral upper ext.). She exhibits no edema or deformity. Lymphadenopathy:     She has no cervical adenopathy. Neurological: She is alert and oriented to person, place, and time. No cranial nerve deficit. Coordination normal.   Skin: Skin is warm and dry. No rash noted. She is not diaphoretic. No erythema. No pallor. Psychiatric: She has a normal mood and affect. Her behavior is normal. Judgment and thought content normal.   Nursing note and vitals reviewed.        MDM  Number of Diagnoses or Management Options  Injury of right shoulder, initial encounter:   Diagnosis management comments: Shoulder contustion, shoulder dislocation, shoulder fx.  44 y/o right hand dominant female with mechanical fall without LOC falling onto right shoulder. No other complaints or injury. Likely contusion. Plan:  Shoulder xray ap/lat, NSAIDS for pain. Reassessment:  Xray negative for fx and/or dislocation. Will d/c with shoulder exercises and PCP f/u       Amount and/or Complexity of Data Reviewed  Tests in the radiology section of CPT®: ordered and reviewed    Risk of Complications, Morbidity, and/or Mortality  Presenting problems: moderate  Diagnostic procedures: moderate  Management options: moderate    Patient Progress  Patient progress: stable        ED Course       Procedures    7:01 AM  The patient has been reevaluated. The patient is ready for discharge. The patient's signs, symptoms, diagnosis, and discharge instructions have been discussed and the patient/ family has conveyed their understanding. The patient is to follow up as recommended or return to the ED should their symptoms worsen. Plan has been discussed and the patient is in agreement. LABORATORY TESTS:  No results found for this or any previous visit (from the past 12 hour(s)). IMAGING RESULTS:  XR SHOULDER RT AP/LAT MIN 2 V   Final Result        No results found. MEDICATIONS GIVEN:  Medications   ketorolac (TORADOL) injection 30 mg (30 mg IntraMUSCular Given 4/9/18 2196)       IMPRESSION:  1. Injury of right shoulder, initial encounter        PLAN:  1. Discharge Medication List as of 4/9/2018  7:00 PM        2.    Follow-up Information     Follow up With Details Comments [address], DO  If symptoms worsen [address]  [facility] [address]      [facility] EMERGENCY DEPT  If symptoms worsen [phone] [facility]  [address] [employer]   [phone]            Return to ED for new or worsening symptoms       Mercy Sutton MD

## 2018-06-08 ENCOUNTER — OFFICE VISIT (OUTPATIENT)
Dept: FAMILY MEDICINE CLINIC | Age: 38
End: 2018-06-08

## 2018-06-08 VITALS
BODY MASS INDEX: 44.41 KG/M2 | DIASTOLIC BLOOD PRESSURE: 92 MMHG | TEMPERATURE: 99.4 F | RESPIRATION RATE: 18 BRPM | SYSTOLIC BLOOD PRESSURE: 145 MMHG | HEART RATE: 98 BPM | OXYGEN SATURATION: 98 % | WEIGHT: 293 LBS | HEIGHT: 68 IN

## 2018-06-08 DIAGNOSIS — E66.01 MORBID OBESITY WITH BMI OF 45.0-49.9, ADULT (HCC): ICD-10-CM

## 2018-06-08 DIAGNOSIS — F41.9 ANXIETY: Primary | ICD-10-CM

## 2018-06-08 DIAGNOSIS — J45.20 MILD INTERMITTENT ASTHMA WITHOUT COMPLICATION: ICD-10-CM

## 2018-06-08 DIAGNOSIS — I10 ESSENTIAL HYPERTENSION WITH GOAL BLOOD PRESSURE LESS THAN 140/90: ICD-10-CM

## 2018-06-08 RX ORDER — AMLODIPINE BESYLATE 5 MG/1
5 TABLET ORAL DAILY
Qty: 30 TAB | Refills: 5 | Status: SHIPPED | OUTPATIENT
Start: 2018-06-08 | End: 2019-10-07 | Stop reason: SDUPTHER

## 2018-06-08 RX ORDER — ALBUTEROL SULFATE 90 UG/1
1 AEROSOL, METERED RESPIRATORY (INHALATION)
Qty: 1 INHALER | Refills: 2 | Status: SHIPPED | OUTPATIENT
Start: 2018-06-08 | End: 2019-09-29

## 2018-06-08 RX ORDER — PHENTERMINE HYDROCHLORIDE 37.5 MG/1
37.5 TABLET ORAL DAILY
Qty: 30 TAB | Refills: 2 | Status: SHIPPED | OUTPATIENT
Start: 2018-06-08 | End: 2019-09-29

## 2018-06-08 RX ORDER — CLONAZEPAM 1 MG/1
1 TABLET ORAL
Qty: 30 TAB | Refills: 2 | Status: SHIPPED | OUTPATIENT
Start: 2018-06-08 | End: 2019-09-29

## 2018-06-08 NOTE — PROGRESS NOTES
Chief Complaint   Patient presents with    Anxiety    Medication Evaluation    Weight Management     Patient in office today to discuss restarting klonopin,   Pt states anxiety has worsened in the past 2 wks, recently started a new job. Reports increased anxiety since starting new job. Feeling overwhelmed. Denies any panic attacks. Anxiety occurs in waves. Denies being daily. Has previously tolerated Klonopin. Denies any SEs of medication while she was on medication. Ran out of her amlodipine some time ago. BP is up today. Pt would like to discuss restarting phentermine for weight loss, have noticed an improvement in weight. Still exercising 3-4 times a week. Watching what she eats. Drinking a lot of water. Phentermine helps suppress her appetite throughout the day. Denies any other concerns at this time. Chief Complaint   Patient presents with    Anxiety    Medication Evaluation    Weight Management     she is a 43y.o. year old female who presents for evalution. Reviewed PmHx, RxHx, FmHx, SocHx, AllgHx and updated and dated in the chart.     Review of Systems - negative except as listed above in the HPI    Objective:     Vitals:    06/08/18 1445   BP: (!) 145/92   Pulse: 98   Resp: 18   Temp: 99.4 °F (37.4 °C)   TempSrc: Oral   SpO2: 98%   Weight: 297 lb (134.7 kg)   Height: 5' 8\" (1.727 m)     Physical Examination: General appearance - alert, well appearing, and in no distress; overweight  Mental status - depressed mood  Eyes - pupils equal and reactive, extraocular eye movements intact  Ears - bilateral TM's and external ear canals normal  Nose - normal and patent, no erythema, discharge or polyps and normal nontender sinuses  Mouth - mucous membranes moist, pharynx normal without lesions  Neck - supple, no significant adenopathy  Chest - clear to auscultation, no wheezes, rales or rhonchi, symmetric air entry  Heart - normal rate, regular rhythm, normal S1, S2, no murmurs    Assessment/ Plan:   Diagnoses and all orders for this visit:    1. Anxiety  -     clonazePAM (KLONOPIN) 1 mg tablet; Take 1 Tab by mouth daily as needed. - must last 30 days. Refilled rx. Reinforced SEs/DRs of medication and how to take responsibly. 2. Morbid obesity with BMI of 45.0-49.9, adult (HCC)  -     phentermine (ADIPEX-P) 37.5 mg tablet; Take 1 Tab by mouth daily. Max Daily Amount: 37.5 mg. Refilled rx. Did not recommend taking longer than 3 months due to side effects and long term risks. Reinforced SEs/ADRs of medication. Enc pt to continue with efforts to follow a healthy diet and exercise as tolerated. Enc pt to consider alt options once she has health insurance. 3. Mild intermittent asthma without complication  -     albuterol (VENTOLIN HFA) 90 mcg/actuation inhaler; Take 1 Puff by inhalation every four (4) hours as needed for Wheezing. Refilled rx. 4. Essential hypertension with goal blood pressure less than 140/90  -     amLODIPine (NORVASC) 5 mg tablet; Take 1 Tab by mouth daily. Refilled rx. Enc pt to continue taking daily. Follow-up Disposition:  Return if symptoms worsen or fail to improve. I have discussed the diagnosis with the patient and the intended plan as seen in the above orders. The patient has received an after-visit summary and questions were answered concerning future plans. Medication Side Effects and Warnings were discussed with patient: yes  Patient Labs were reviewed and or requested: no  Patient Past Records were reviewed and or requested  yes  Patient / Caregiver Understanding of treatment plan was verbalized during office visit YES    AUSTIN Marcial    There are no Patient Instructions on file for this visit.

## 2018-06-08 NOTE — PROGRESS NOTES
Chief Complaint   Patient presents with    Anxiety    Medication Evaluation    Weight Management     Patient in office today to discuss restarting klonopin,   Pt states anxiety has worsened in the past 2 wks, recently started a new job. Pt would like to discuss restarting phentermine for weight loss, have noticed an improvement in weight. 1. Have you been to the ER, urgent care clinic since your last visit? Hospitalized since your last visit? No    2. Have you seen or consulted any other health care providers outside of the [address] [place] [address] since your last visit? Include any pap smears or colon screening.  No

## 2018-06-25 ENCOUNTER — HOSPITAL ENCOUNTER (EMERGENCY)
Age: 38
Discharge: HOME | End: 2018-06-25
Attending: STUDENT IN AN ORGANIZED HEALTH CARE EDUCATION/TRAINING PROGRAM
Payer: SELF-PAY

## 2018-06-25 ENCOUNTER — APPOINTMENT (OUTPATIENT)
Dept: GENERAL RADIOLOGY | Age: 38
End: 2018-06-25
Attending: STUDENT IN AN ORGANIZED HEALTH CARE EDUCATION/TRAINING PROGRAM
Payer: SELF-PAY

## 2018-06-25 VITALS
WEIGHT: 280 LBS | DIASTOLIC BLOOD PRESSURE: 83 MMHG | OXYGEN SATURATION: 99 % | RESPIRATION RATE: 19 BRPM | BODY MASS INDEX: 42.44 KG/M2 | HEART RATE: 79 BPM | HEIGHT: 68 IN | TEMPERATURE: 98 F | SYSTOLIC BLOOD PRESSURE: 128 MMHG

## 2018-06-25 DIAGNOSIS — K59.01 SLOW TRANSIT CONSTIPATION: Primary | ICD-10-CM

## 2018-06-25 LAB
ALBUMIN SERPL-MCNC: 3.4 G/DL (ref 3.5–5)
ALBUMIN/GLOB SERPL: 0.9 {RATIO} (ref 1.1–2.2)
ALP SERPL-CCNC: 72 U/L (ref 45–117)
ALT SERPL-CCNC: 17 U/L (ref 12–78)
ANION GAP SERPL CALC-SCNC: 11 MMOL/L (ref 5–15)
AST SERPL-CCNC: 11 U/L (ref 15–37)
BASOPHILS # BLD: 0 K/UL (ref 0–0.1)
BASOPHILS NFR BLD: 0 % (ref 0–1)
BILIRUB SERPL-MCNC: 0.6 MG/DL (ref 0.2–1)
BUN SERPL-MCNC: 7 MG/DL (ref 6–20)
BUN/CREAT SERPL: 12 (ref 12–20)
CALCIUM SERPL-MCNC: 8.9 MG/DL (ref 8.5–10.1)
CHLORIDE SERPL-SCNC: 105 MMOL/L (ref 97–108)
CO2 SERPL-SCNC: 26 MMOL/L (ref 21–32)
CREAT SERPL-MCNC: 0.58 MG/DL (ref 0.55–1.02)
DIFFERENTIAL METHOD BLD: ABNORMAL
EOSINOPHIL # BLD: 0.2 K/UL (ref 0–0.4)
EOSINOPHIL NFR BLD: 3 % (ref 0–7)
ERYTHROCYTE [DISTWIDTH] IN BLOOD BY AUTOMATED COUNT: 18.1 % (ref 11.5–14.5)
GLOBULIN SER CALC-MCNC: 3.9 G/DL (ref 2–4)
GLUCOSE SERPL-MCNC: 81 MG/DL (ref 65–100)
HCG UR QL: NEGATIVE
HCT VFR BLD AUTO: 36.3 % (ref 35–47)
HGB BLD-MCNC: 10.7 G/DL (ref 11.5–16)
IMM GRANULOCYTES # BLD: 0 K/UL (ref 0–0.04)
IMM GRANULOCYTES NFR BLD AUTO: 0 % (ref 0–0.5)
LIPASE SERPL-CCNC: 78 U/L (ref 73–393)
LYMPHOCYTES # BLD: 2.3 K/UL (ref 0.8–3.5)
LYMPHOCYTES NFR BLD: 29 % (ref 12–49)
MCH RBC QN AUTO: 23.2 PG (ref 26–34)
MCHC RBC AUTO-ENTMCNC: 29.5 G/DL (ref 30–36.5)
MCV RBC AUTO: 78.6 FL (ref 80–99)
MONOCYTES # BLD: 0.4 K/UL (ref 0–1)
MONOCYTES NFR BLD: 5 % (ref 5–13)
NEUTS SEG # BLD: 5 K/UL (ref 1.8–8)
NEUTS SEG NFR BLD: 63 % (ref 32–75)
NRBC # BLD: 0 K/UL (ref 0–0.01)
NRBC BLD-RTO: 0 PER 100 WBC
PLATELET # BLD AUTO: 372 K/UL (ref 150–400)
PMV BLD AUTO: 10.9 FL (ref 8.9–12.9)
POTASSIUM SERPL-SCNC: 3.8 MMOL/L (ref 3.5–5.1)
PROT SERPL-MCNC: 7.3 G/DL (ref 6.4–8.2)
RBC # BLD AUTO: 4.62 M/UL (ref 3.8–5.2)
RBC MORPH BLD: ABNORMAL
SODIUM SERPL-SCNC: 142 MMOL/L (ref 136–145)
WBC # BLD AUTO: 7.9 K/UL (ref 3.6–11)

## 2018-06-25 PROCEDURE — 99283 EMERGENCY DEPT VISIT LOW MDM: CPT

## 2018-06-25 PROCEDURE — 80053 COMPREHEN METABOLIC PANEL: CPT | Performed by: STUDENT IN AN ORGANIZED HEALTH CARE EDUCATION/TRAINING PROGRAM

## 2018-06-25 PROCEDURE — 85025 COMPLETE CBC W/AUTO DIFF WBC: CPT | Performed by: STUDENT IN AN ORGANIZED HEALTH CARE EDUCATION/TRAINING PROGRAM

## 2018-06-25 PROCEDURE — 83690 ASSAY OF LIPASE: CPT | Performed by: STUDENT IN AN ORGANIZED HEALTH CARE EDUCATION/TRAINING PROGRAM

## 2018-06-25 PROCEDURE — 81025 URINE PREGNANCY TEST: CPT

## 2018-06-25 PROCEDURE — 74022 RADEX COMPL AQT ABD SERIES: CPT

## 2018-06-25 NOTE — DISCHARGE INSTRUCTIONS
Constipation: Care Instructions  Your Care Instructions    Constipation means that you have a hard time passing stools (bowel movements). People pass stools from 3 times a day to once every 3 days. What is normal for you may be different. Constipation may occur with pain in the rectum and cramping. The pain may get worse when you try to pass stools. Sometimes there are small amounts of bright red blood on toilet paper or the surface of stools. This is because of enlarged veins near the rectum (hemorrhoids). A few changes in your diet and lifestyle may help you avoid ongoing constipation. Your doctor may also prescribe medicine to help loosen your stool. Some medicines can cause constipation. These include pain medicines and antidepressants. Tell your doctor about all the medicines you take. Your doctor may want to make a medicine change to ease your symptoms. Follow-up care is a key part of your treatment and safety. Be sure to make and go to all appointments, and call your doctor if you are having problems. It's also a good idea to know your test results and keep a list of the medicines you take. How can you care for yourself at home? · Drink plenty of fluids, enough so that your urine is light yellow or clear like water. If you have kidney, heart, or liver disease and have to limit fluids, talk with your doctor before you increase the amount of fluids you drink. · Include high-fiber foods in your diet each day. These include fruits, vegetables, beans, and whole grains. · Get at least 30 minutes of exercise on most days of the week. Walking is a good choice. You also may want to do other activities, such as running, swimming, cycling, or playing tennis or team sports. · Take a fiber supplement, such as Citrucel or Metamucil, every day. Read and follow all instructions on the label. · Schedule time each day for a bowel movement. A daily routine may help.  Take your time having your bowel movement. · Support your feet with a small step stool when you sit on the toilet. This helps flex your hips and places your pelvis in a squatting position. · Your doctor may recommend an over-the-counter laxative to relieve your constipation. Examples are Milk of Magnesia and MiraLax. Read and follow all instructions on the label. Do not use laxatives on a long-term basis. When should you call for help? Call your doctor now or seek immediate medical care if:  ? · You have new or worse belly pain. ? · You have new or worse nausea or vomiting. ? · You have blood in your stools. ? Watch closely for changes in your health, and be sure to contact your doctor if:  ? · Your constipation is getting worse. ? · You do not get better as expected. Where can you learn more? Go to http://patricia-nan.info/. Enter 21 [phone] in the search box to learn more about \"Constipation: Care Instructions. \"  Current as of: March 20, 2017  Content Version: 11.4  © 4667-6843 [employer]. Care instructions adapted under license by [employer] (which disclaims liability or warranty for this information). If you have questions about a medical condition or this instruction, always ask your healthcare professional. [address] any warranty or liability for your use of this information.

## 2018-06-25 NOTE — ED PROVIDER NOTES
HPI Comments: 43 y.o. female with past medical history significant for HTN, asthma, and anxiety who presents to the ED with chief complaint of abdominal pain. Pt reports \"stabbing\" abdominal pain onset about a week ago accompanied by constipation, nausea, and bloating, says she \"feels like she needs to pass gas but can't. \" Pt states she has not had a BM in 1 week which is not normal for her. Pt states she has had decreased appetite but has been eating and drinking a lot of water. Pt states she has hx of constipation \"once in a blue moon\" but \"not like this. \" Pt states she has hx of HTN and takes amlodipine. Pt states she also takes Xyzal for allergies and phentermine for weight loss. Pt states there is a chance she could be pregnant. There are no other acute medical complaints voiced at this time. Social Hx: Former smoker. Denies EtOH or drug use. PCP: Lavelle Seen, DO    Note written by Jarrod Dhaliwal, as dictated by Brianna Rubio MD 2:00 PM     The history is provided by the patient and a relative (daughter). Past Medical History:   Diagnosis Date    Anxiety     Asthma     Hypertension     Rotator cuff tear        Past Surgical History:   Procedure Laterality Date    HX CHOLECYSTECTOMY      HX CYST REMOVAL      HX TUBAL LIGATION           Family History:   Problem Relation Age of Onset    Hypertension Mother     Hypertension Sister     Cancer Maternal Aunt        Social History     Social History    Marital status: SINGLE     Spouse name: N/A    Number of children: N/A    Years of education: N/A     Occupational History    Not on file.      Social History Main Topics    Smoking status: Former Smoker    Smokeless tobacco: Never Used    Alcohol use No    Drug use: No    Sexual activity: Yes     Partners: Male     Birth control/ protection: Condom     Other Topics Concern    Not on file     Social History Narrative         ALLERGIES: Bactrim [sulfamethoprim ds]    Review of Systems   Constitutional: Positive for appetite change (decreased). Negative for activity change, diaphoresis, fatigue and fever. HENT: Negative for congestion and sore throat. Eyes: Negative for photophobia and visual disturbance. Respiratory: Negative for chest tightness and shortness of breath. Cardiovascular: Negative for chest pain, palpitations and leg swelling. Gastrointestinal: Positive for abdominal pain, constipation and nausea. Negative for blood in stool, diarrhea and vomiting. Genitourinary: Negative for difficulty urinating, dysuria, flank pain, frequency and hematuria. Musculoskeletal: Negative for back pain. Neurological: Negative for dizziness, syncope, numbness and headaches. All other systems reviewed and are negative. Vitals:    06/25/18 1235   BP: 128/83   Pulse: 79   Resp: 19   Temp: 98 °F (36.7 °C)   SpO2: 99%   Weight: 127 kg (280 lb)   Height: 5' 8\" (1.727 m)            Physical Exam   Constitutional: She is oriented to person, place, and time. She appears well-developed and well-nourished. No distress. Obese. HENT:   Head: Normocephalic and atraumatic. Nose: Nose normal.   Mouth/Throat: Oropharynx is clear and moist. No oropharyngeal exudate. Eyes: Conjunctivae and EOM are normal. Right eye exhibits no discharge. Left eye exhibits no discharge. No scleral icterus. Neck: Normal range of motion. Neck supple. No JVD present. No tracheal deviation present. No thyromegaly present. Cardiovascular: Normal rate, regular rhythm, normal heart sounds and intact distal pulses. Exam reveals no gallop and no friction rub. No murmur heard. Pulmonary/Chest: Effort normal and breath sounds normal. No stridor. No respiratory distress. She has no wheezes. She has no rales. She exhibits no tenderness. Abdominal: Bowel sounds are normal. She exhibits no mass. There is no rebound. Abdomen soft, distended. Slightly tender throughout. Musculoskeletal: Normal range of motion. She exhibits no edema or tenderness. Lymphadenopathy:     She has no cervical adenopathy. Neurological: She is alert and oriented to person, place, and time. No cranial nerve deficit. Coordination normal.   Skin: Skin is warm and dry. No rash noted. She is not diaphoretic. No erythema. No pallor. Psychiatric: She has a normal mood and affect. Her behavior is normal. Judgment and thought content normal.   Nursing note and vitals reviewed. Note written by Jarrod Pardo, as dictated by Kika Holliday MD 2:01 PM    MDM  Number of Diagnoses or Management Options  Slow transit constipation:      Amount and/or Complexity of Data Reviewed  Clinical lab tests: ordered and reviewed  Tests in the radiology section of CPT®: reviewed and ordered  Independent visualization of images, tracings, or specimens: yes    Risk of Complications, Morbidity, and/or Mortality  Presenting problems: moderate  Diagnostic procedures: moderate  Management options: moderate    Patient Progress  Patient progress: stable        ED Course       Procedures    4:03 PM  Patient's results have been reviewed with them. Patient and/or family have verbally conveyed their understanding and agreement of the patient's signs, symptoms, diagnosis, treatment and prognosis and additionally agree to follow up as recommended or return to the Emergency Room should their condition change prior to follow-up. Discharge instructions have also been provided to the patient with some educational information regarding their diagnosis as well a list of reasons why they would want to return to the ER prior to their follow-up appointment should their condition change. 7:31 PM  The patient has been reevaluated. The patient is ready for discharge. The patient's signs, symptoms, diagnosis, and discharge instructions have been discussed and the patient/ family has conveyed their understanding.  The patient is to follow up as recommended or return to the ED should their symptoms worsen. Plan has been discussed and the patient is in agreement. LABORATORY TESTS:  No results found for this or any previous visit (from the past 12 hour(s)). IMAGING RESULTS:  XR ABD ACUTE W 1 V CHEST   Final Result        No results found. MEDICATIONS GIVEN:  Medications - No data to display    IMPRESSION:  1. Slow transit constipation        PLAN:  1. Discharge Medication List as of 6/25/2018  4:04 PM        2.    Follow-up Information     Follow up With Details Comments [facility]  [facility], DO  If symptoms worsen N [address] [place] [address] [address]      [facility] EMERGENCY DEPT  If symptoms worsen [address] [facility]  [phone]            Return to ED for new or worsening symptoms       Coco Perry MD

## 2018-06-25 NOTE — ED TRIAGE NOTES
Abdominal discomfort and distention, unable to pass gas or have a BM for about one week. Has taken MOM and TUMS, fleets enema without relief.

## 2018-07-08 ENCOUNTER — HOSPITAL ENCOUNTER (EMERGENCY)
Age: 38
Discharge: HOME | End: 2018-07-08
Attending: EMERGENCY MEDICINE
Payer: COMMERCIAL

## 2018-07-08 VITALS
WEIGHT: 293 LBS | TEMPERATURE: 98.5 F | BODY MASS INDEX: 44.41 KG/M2 | OXYGEN SATURATION: 99 % | DIASTOLIC BLOOD PRESSURE: 71 MMHG | SYSTOLIC BLOOD PRESSURE: 130 MMHG | HEIGHT: 68 IN | RESPIRATION RATE: 16 BRPM | HEART RATE: 90 BPM

## 2018-07-08 DIAGNOSIS — S83.421A SPRAIN OF LATERAL COLLATERAL LIGAMENT OF RIGHT KNEE, INITIAL ENCOUNTER: Primary | ICD-10-CM

## 2018-07-08 PROCEDURE — 99282 EMERGENCY DEPT VISIT SF MDM: CPT

## 2018-07-08 RX ORDER — KETOROLAC TROMETHAMINE 10 MG/1
10 TABLET, FILM COATED ORAL
COMMUNITY
End: 2019-09-29

## 2018-07-08 RX ORDER — IBUPROFEN 200 MG
600 TABLET ORAL
COMMUNITY
End: 2018-07-08

## 2018-07-08 RX ORDER — IBUPROFEN 800 MG/1
800 TABLET ORAL
Qty: 20 TAB | Refills: 0 | Status: SHIPPED | OUTPATIENT
Start: 2018-07-08 | End: 2018-07-15

## 2018-07-08 NOTE — DISCHARGE INSTRUCTIONS
Knee Sprain: Care Instructions  Your Care Instructions    A knee sprain is one or more stretched, partly torn, or completely torn knee ligaments. Ligaments are bands of ropelike tissue that connect bone to bone and make the knee stable. The knee has four main ligaments. Knee sprains often happen because of a twisting or bending injury from sports such as skiing, basketball, soccer, or football. The knee turns one way while the lower or upper leg goes another way. A sprain also can happen when the knee is hit from the side or the front. If a knee ligament is slightly stretched, you will probably need only home treatment. You may need a splint or brace (immobilizer) for a partly torn ligament. A complete tear may need surgery. A minor knee sprain may take up to 6 weeks to heal, while a severe sprain may take months. Follow-up care is a key part of your treatment and safety. Be sure to make and go to all appointments, and call your doctor if you are having problems. It's also a good idea to know your test results and keep a list of the medicines you take. How can you care for yourself at home? · Follow instructions about how much weight you can put on your leg and how to walk with crutches. · Prop up your leg on a pillow when you ice it or anytime you sit or lie down for the next 3 days. Try to keep it above the level of your heart. This will help reduce swelling. · Put ice or a cold pack on your knee for 10 to 20 minutes at a time. Try to do this every 1 to 2 hours for the next 3 days (when you are awake) or until the swelling goes down. Put a thin cloth between the ice and your skin. Do not get the splint wet. · If you have an elastic bandage, make sure it is snug but not so tight that your leg is numb, tingles, or swells below the bandage. You can loosen the bandage if it is too tight. · Your doctor may recommend a brace (immobilizer) to support your knee while it heals. Wear it as directed.   · Ask your doctor if you can take an over-the-counter pain medicine, such as acetaminophen (Tylenol), ibuprofen (Advil, Motrin), or naproxen (Aleve). Be safe with medicines. Read and follow all instructions on the label. When should you call for help? Call 911 anytime you think you may need emergency care. For example, call if:  ? · You have sudden chest pain and shortness of breath, or you cough up blood. ?Call your doctor now or seek immediate medical care if:  ? · You have increased or severe pain. ? · You cannot move your toes or ankle. ? · Your foot is cool or pale or changes color. ? · You have tingling, weakness, or numbness in your foot or leg. ? · Your splint or brace feels too tight. ? · You are unable to straighten the knee, or the knee \"locks. \"   ? · You have signs of a blood clot in your leg, such as:  ¨ Pain in your calf, back of the knee, thigh, or groin. ¨ Redness and swelling in your leg. ? Watch closely for changes in your health, and be sure to contact your doctor if:  ? · Your pain is not getting better or is getting worse. Where can you learn more? Go to http://patricia-nan.info/. Enter N406 in the search box to learn more about \"Knee Sprain: Care Instructions. \"  Current as of: March 21, 2017  Content Version: 11.4  © 1831-6400 [employer]. Care instructions adapted under license by [employer] (which disclaims liability or warranty for this information). If you have questions about a medical condition or this instruction, always ask your healthcare professional. [place] [address] any warranty or liability for your use of this information. Learning About RICE (Rest, Ice, Compression, and Elevation)  What is RICE? RICE is a way to care for an injury. RICE helps relieve pain and swelling. It may also help with healing and flexibility. RICE stands for:  · Rest and protect the injured or sore area.   · Ice or a cold pack used as soon as possible. · Compression, or wrapping the injured or sore area with an elastic bandage. · Elevation (propping up) the injured or sore area. How do you do RICE? You can use RICE for home treatment when you have general aches and pains or after an injury or surgery. Rest  · Do not put weight on the injury for at least 24 to 48 hours. · Use crutches for a badly sprained knee or ankle. · Support a sprained wrist, elbow, or shoulder with a sling. Ice  · Put ice or a cold pack on the injury right away to reduce pain and swelling. Frozen vegetables will also work as an ice pack. Put a thin cloth between the ice or cold pack and your skin. The cloth protects the injured area from getting too cold. · Use ice for 10 to 15 minutes at a time for the first 48 to 72 hours. Compression  · Use compression for sprains, strains, and surgeries of the arms and legs. · Wrap the injured area with an elastic bandage or compression sleeve to reduce swelling. · Don't wrap it too tightly. If the area below it feels numb, tingles, or feels cool, loosen the wrap. Elevation  · Use elevation for areas of the body that can be propped up, such as arms and legs. · Prop up the injured area on pillows whenever you use ice. Keep it propped up anytime you sit or lie down. · Try to keep the injured area at or above the level of your heart. This will help reduce swelling and bruising. Where can you learn more? Go to http://patricia-nan.info/. Enter K454 in the search box to learn more about \"Learning About RICE (Rest, Ice, Compression, and Elevation). \"  Current as of: March 21, 2017  Content Version: 11.4  © 7341-0199 [employer]. Care instructions adapted under license by [employer] (which disclaims liability or warranty for this information).  If you have questions about a medical condition or this instruction, always ask your healthcare professional. [employer] disclaims any warranty or liability for your use of this information.

## 2018-07-08 NOTE — ED TRIAGE NOTES
Pt ambulates to treatment area with a steady gait, she states that last night when walking [place] of the movie theater down the ramp she felt a sharp pain go through her right knee. She continued to walk but it kept hurting. When she got home she put ice on it and took some Ibuprofen and then took some Toradol for the pain. The medicine helped a little but today the pain is back. She has not taken anything today for the pain.

## 2018-07-08 NOTE — ED PROVIDER NOTES
Patient is a 43 y.o. female presenting with knee pain. The history is provided by the patient. Knee Pain    This is a new problem. The current episode started yesterday. The problem occurs constantly. The problem has not changed since onset. The pain is present in the right knee. The quality of the pain is described as aching. The pain is mild. Associated symptoms include stiffness. Pertinent negatives include full range of motion, no tingling, no back pain and no neck pain. She has tried nothing for the symptoms. There has been a history of trauma (fall from standing yesterday, ambulatory after event). Past Medical History:   Diagnosis Date    Anxiety     Asthma     Hypertension     Rotator cuff tear        Past Surgical History:   Procedure Laterality Date    HX CHOLECYSTECTOMY      HX CYST REMOVAL      HX TUBAL LIGATION           Family History:   Problem Relation Age of Onset    Hypertension Mother     Hypertension Sister     Cancer Maternal Aunt        Social History     Social History    Marital status: SINGLE     Spouse name: N/A    Number of children: N/A    Years of education: N/A     Occupational History    Not on file. Social History Main Topics    Smoking status: Former Smoker    Smokeless tobacco: Never Used    Alcohol use No    Drug use: No    Sexual activity: Yes     Partners: Male     Birth control/ protection: Condom     Other Topics Concern    Not on file     Social History Narrative         ALLERGIES: Bactrim [sulfamethoprim ds]    Review of Systems   Musculoskeletal: Positive for stiffness. Negative for back pain and neck pain. Neurological: Negative for tingling. All other systems reviewed and are negative. Vitals:    07/08/18 1637   BP: 130/71   Pulse: 90   Resp: 16   Temp: 98.5 °F (36.9 °C)   SpO2: 99%   Weight: 133.8 kg (294 lb 15.6 oz)   Height: 5' 8\" (1.727 m)            Physical Exam   Constitutional: She appears well-developed and well-nourished. No distress. HENT:   Head: Normocephalic and atraumatic. Eyes: Conjunctivae are normal.   Neck: Neck supple. Cardiovascular: Normal rate and regular rhythm. Pulmonary/Chest: Effort normal. No stridor. No respiratory distress. Abdominal: She exhibits no distension. Musculoskeletal: Normal range of motion. Right knee: She exhibits normal range of motion, no swelling, no effusion, no deformity, no erythema, normal alignment, no LCL laxity, normal patellar mobility and no MCL laxity. Tenderness found. LCL tenderness noted. Normal anterior/posterior drawer   Neurological: She is alert. Coordination normal.   Skin: Skin is warm and dry. Psychiatric: She has a normal mood and affect. Nursing note and vitals reviewed. MDM    43 y.o. female presents with right knee pain after fall at a movie theater from standing and feeling pain immediately. Has been more stiff today since waking. Some pain with confrontation of LCL but knee is stable without acute trauma findings. Suspect ligamentous sprain or other soft-tissue injury. Pt given instructions for supportive care including NSAIDs, rest, ice, compression, and elevation to help alleviate symptoms. Plan to follow up with PCP as needed and return precautions discussed for worsening or new concerning symptoms.      ED Course       Procedures

## 2018-09-21 ENCOUNTER — TELEPHONE (OUTPATIENT)
Dept: FAMILY MEDICINE CLINIC | Age: 38
End: 2018-09-21

## 2018-10-25 ENCOUNTER — HOSPITAL ENCOUNTER (EMERGENCY)
Age: 38
Discharge: HOME | End: 2018-10-25
Attending: EMERGENCY MEDICINE
Payer: SELF-PAY

## 2018-10-25 ENCOUNTER — APPOINTMENT (OUTPATIENT)
Dept: CT IMAGING | Age: 38
End: 2018-10-25
Attending: EMERGENCY MEDICINE
Payer: SELF-PAY

## 2018-10-25 VITALS
DIASTOLIC BLOOD PRESSURE: 69 MMHG | OXYGEN SATURATION: 100 % | WEIGHT: 293 LBS | HEIGHT: 68 IN | RESPIRATION RATE: 16 BRPM | SYSTOLIC BLOOD PRESSURE: 157 MMHG | BODY MASS INDEX: 44.41 KG/M2 | TEMPERATURE: 98.3 F | HEART RATE: 78 BPM

## 2018-10-25 DIAGNOSIS — B96.89 BV (BACTERIAL VAGINOSIS): ICD-10-CM

## 2018-10-25 DIAGNOSIS — N76.0 BV (BACTERIAL VAGINOSIS): ICD-10-CM

## 2018-10-25 DIAGNOSIS — N73.0 PID (ACUTE PELVIC INFLAMMATORY DISEASE): Primary | ICD-10-CM

## 2018-10-25 LAB
APPEARANCE UR: CLEAR
BACTERIA URNS QL MICRO: NEGATIVE /HPF
BILIRUB UR QL: NEGATIVE
CLUE CELLS VAG QL WET PREP: NORMAL
COLOR UR: ABNORMAL
EPITH CASTS URNS QL MICRO: ABNORMAL /LPF
GLUCOSE UR STRIP.AUTO-MCNC: NEGATIVE MG/DL
HCG UR QL: NEGATIVE
HGB UR QL STRIP: NEGATIVE
KETONES UR QL STRIP.AUTO: NEGATIVE MG/DL
KOH PREP SPEC: NORMAL
LEUKOCYTE ESTERASE UR QL STRIP.AUTO: NEGATIVE
MUCOUS THREADS URNS QL MICRO: ABNORMAL /LPF
NITRITE UR QL STRIP.AUTO: NEGATIVE
PH UR STRIP: 6.5 [PH] (ref 5–8)
PROT UR STRIP-MCNC: NEGATIVE MG/DL
RBC #/AREA URNS HPF: ABNORMAL /HPF (ref 0–5)
SERVICE CMNT-IMP: NORMAL
SP GR UR REFRACTOMETRY: 1.02 (ref 1–1.03)
T VAGINALIS VAG QL WET PREP: NORMAL
UR CULT HOLD, URHOLD: NORMAL
UROBILINOGEN UR QL STRIP.AUTO: 0.2 EU/DL (ref 0.2–1)
WBC URNS QL MICRO: ABNORMAL /HPF (ref 0–4)

## 2018-10-25 PROCEDURE — 99284 EMERGENCY DEPT VISIT MOD MDM: CPT

## 2018-10-25 PROCEDURE — 74176 CT ABD & PELVIS W/O CONTRAST: CPT

## 2018-10-25 PROCEDURE — 87210 SMEAR WET MOUNT SALINE/INK: CPT | Performed by: EMERGENCY MEDICINE

## 2018-10-25 PROCEDURE — 81025 URINE PREGNANCY TEST: CPT

## 2018-10-25 PROCEDURE — 74011250636 HC RX REV CODE- 250/636

## 2018-10-25 PROCEDURE — 87491 CHLMYD TRACH DNA AMP PROBE: CPT | Performed by: EMERGENCY MEDICINE

## 2018-10-25 PROCEDURE — 96372 THER/PROPH/DIAG INJ SC/IM: CPT

## 2018-10-25 PROCEDURE — 74011250637 HC RX REV CODE- 250/637: Performed by: EMERGENCY MEDICINE

## 2018-10-25 PROCEDURE — 74011250636 HC RX REV CODE- 250/636: Performed by: EMERGENCY MEDICINE

## 2018-10-25 PROCEDURE — 96375 TX/PRO/DX INJ NEW DRUG ADDON: CPT

## 2018-10-25 PROCEDURE — 81001 URINALYSIS AUTO W/SCOPE: CPT | Performed by: EMERGENCY MEDICINE

## 2018-10-25 PROCEDURE — 96374 THER/PROPH/DIAG INJ IV PUSH: CPT

## 2018-10-25 PROCEDURE — 96361 HYDRATE IV INFUSION ADD-ON: CPT

## 2018-10-25 RX ORDER — DOXYCYCLINE HYCLATE 100 MG
100 TABLET ORAL
Status: COMPLETED | OUTPATIENT
Start: 2018-10-25 | End: 2018-10-25

## 2018-10-25 RX ORDER — DIPHENHYDRAMINE HYDROCHLORIDE 50 MG/ML
25 INJECTION, SOLUTION INTRAMUSCULAR; INTRAVENOUS
Status: COMPLETED | OUTPATIENT
Start: 2018-10-25 | End: 2018-10-25

## 2018-10-25 RX ORDER — METOCLOPRAMIDE HYDROCHLORIDE 5 MG/ML
10 INJECTION INTRAMUSCULAR; INTRAVENOUS
Status: COMPLETED | OUTPATIENT
Start: 2018-10-25 | End: 2018-10-25

## 2018-10-25 RX ORDER — LIDOCAINE HYDROCHLORIDE 10 MG/ML
INJECTION, SOLUTION EPIDURAL; INFILTRATION; INTRACAUDAL; PERINEURAL
Status: COMPLETED
Start: 2018-10-25 | End: 2018-10-25

## 2018-10-25 RX ORDER — METRONIDAZOLE 500 MG/1
500 TABLET ORAL 2 TIMES DAILY
Qty: 14 TAB | Refills: 0 | Status: SHIPPED | OUTPATIENT
Start: 2018-10-25 | End: 2018-11-01

## 2018-10-25 RX ORDER — DOXYCYCLINE HYCLATE 100 MG
100 TABLET ORAL 2 TIMES DAILY
Qty: 28 TAB | Refills: 0 | Status: SHIPPED | OUTPATIENT
Start: 2018-10-25 | End: 2018-11-08

## 2018-10-25 RX ORDER — KETOROLAC TROMETHAMINE 30 MG/ML
30 INJECTION, SOLUTION INTRAMUSCULAR; INTRAVENOUS
Status: COMPLETED | OUTPATIENT
Start: 2018-10-25 | End: 2018-10-25

## 2018-10-25 RX ORDER — DIPHENHYDRAMINE HYDROCHLORIDE 50 MG/ML
25 INJECTION, SOLUTION INTRAMUSCULAR; INTRAVENOUS
Status: DISCONTINUED | OUTPATIENT
Start: 2018-10-25 | End: 2018-10-25

## 2018-10-25 RX ORDER — METRONIDAZOLE 250 MG/1
500 TABLET ORAL
Status: COMPLETED | OUTPATIENT
Start: 2018-10-25 | End: 2018-10-25

## 2018-10-25 RX ORDER — DEXAMETHASONE SODIUM PHOSPHATE 10 MG/ML
10 INJECTION INTRAMUSCULAR; INTRAVENOUS
Status: COMPLETED | OUTPATIENT
Start: 2018-10-25 | End: 2018-10-25

## 2018-10-25 RX ADMIN — SODIUM CHLORIDE 1000 ML: 900 INJECTION, SOLUTION INTRAVENOUS at 19:20

## 2018-10-25 RX ADMIN — LIDOCAINE HYDROCHLORIDE 250 MG: 10 INJECTION, SOLUTION EPIDURAL; INFILTRATION; INTRACAUDAL; PERINEURAL at 21:42

## 2018-10-25 RX ADMIN — DOXYCYCLINE HYCLATE 100 MG: 100 TABLET, COATED ORAL at 21:34

## 2018-10-25 RX ADMIN — METRONIDAZOLE 500 MG: 250 TABLET ORAL at 20:57

## 2018-10-25 RX ADMIN — DIPHENHYDRAMINE HYDROCHLORIDE 25 MG: 50 INJECTION, SOLUTION INTRAMUSCULAR; INTRAVENOUS at 19:27

## 2018-10-25 RX ADMIN — KETOROLAC TROMETHAMINE 30 MG: 30 INJECTION, SOLUTION INTRAMUSCULAR at 19:28

## 2018-10-25 RX ADMIN — METOCLOPRAMIDE 10 MG: 5 INJECTION, SOLUTION INTRAMUSCULAR; INTRAVENOUS at 19:29

## 2018-10-25 RX ADMIN — LIDOCAINE HYDROCHLORIDE 5 ML: 10 INJECTION, SOLUTION EPIDURAL; INFILTRATION; INTRACAUDAL; PERINEURAL at 21:47

## 2018-10-25 RX ADMIN — DEXAMETHASONE SODIUM PHOSPHATE 10 MG: 10 INJECTION, SOLUTION INTRAMUSCULAR; INTRAVENOUS at 19:30

## 2018-10-25 RX ADMIN — LIDOCAINE HYDROCHLORIDE 5 ML: 10 INJECTION, SOLUTION EPIDURAL; INFILTRATION; INTRACAUDAL; PERINEURAL at 21:48

## 2018-10-25 NOTE — ED TRIAGE NOTES
Pt reports having body aches, headache, and urinary frequency for about 4 days. Pt has been taking Advil and violette-seltzer with no relief. Pt denies fevers, vomiting, diarrhea.

## 2018-10-25 NOTE — ED PROVIDER NOTES
39-year-old female with a history of anxiety, asthma presents with headache, low back pain, urinary frequency, body aches for the past 4 days. She denies any fevers or chills. She's had nausea but no vomiting. Headache is gradual onset and rated 9/10. She denies any URI symptoms but states that she has a mild cough. She denies any changes in her bowel movements. She also complains of pelvic pain and vaginal discharge. Headache Associated symptoms include nausea. Pertinent negatives include no fever, no shortness of breath and no vomiting. Urinary Frequency Associated symptoms include nausea, frequency and back pain. Pertinent negatives include no chills, no vomiting, no flank pain and no abdominal pain. Past Medical History:  
Diagnosis Date  Anxiety  Asthma  Hypertension  Rotator cuff tear Past Surgical History:  
Procedure Laterality Date  HX CHOLECYSTECTOMY  HX CYST REMOVAL    
 HX TUBAL LIGATION Family History:  
Problem Relation Age of Onset  Hypertension Mother  Hypertension Sister  Cancer Maternal Aunt Social History Socioeconomic History  Marital status: SINGLE Spouse name: Not on file  Number of children: Not on file  Years of education: Not on file  Highest education level: Not on file Social Needs  Financial resource strain: Not on file  Food insecurity - worry: Not on file  Food insecurity - inability: Not on file  Transportation needs - medical: Not on file  Transportation needs - non-medical: Not on file Occupational History  Not on file Tobacco Use  Smoking status: Former Smoker  Smokeless tobacco: Never Used Substance and Sexual Activity  Alcohol use: No  
  Alcohol/week: 0.0 oz  Drug use: No  
 Sexual activity: Yes  
  Partners: Male Birth control/protection: Condom Other Topics Concern  Not on file Social History Narrative  Not on file ALLERGIES: Bactrim [sulfamethoprim ds] Review of Systems Constitutional: Negative for chills and fever. HENT: Negative for ear pain and sore throat. Eyes: Negative for pain. Respiratory: Negative for chest tightness and shortness of breath. Cardiovascular: Negative for chest pain and leg swelling. Gastrointestinal: Positive for nausea. Negative for abdominal pain and vomiting. Genitourinary: Positive for frequency. Negative for dysuria and flank pain. Musculoskeletal: Positive for back pain. Skin: Negative for rash. Neurological: Positive for headaches. All other systems reviewed and are negative. Vitals:  
 10/25/18 1841 10/25/18 1843 BP: 157/69 Pulse: 78 Resp: 16 Temp: 98.3 °F (36.8 °C) SpO2: 100% Weight:  132.9 kg (293 lb) Height:  5' 8\" (1.727 m) Physical Exam  
Constitutional: No distress. Obese female HENT:  
Head: Normocephalic and atraumatic. Mouth/Throat: Oropharynx is clear and moist.  
Eyes: Conjunctivae are normal. Pupils are equal, round, and reactive to light. No scleral icterus. Neck: Neck supple. No tracheal deviation present. Cardiovascular: Normal rate, regular rhythm and intact distal pulses. Pulmonary/Chest: Effort normal. No respiratory distress. She has no wheezes. She has no rales. Abdominal: Soft. She exhibits no distension. There is tenderness (RLQ, LLQ). Musculoskeletal: She exhibits tenderness (lumbar tenderness). She exhibits no edema or deformity. Neurological: She is alert. Alert and Oriented x3, Cranial nerves 2-12 intact, normal motor and sensation, negative Romberg, no pronator drift, normal finger to nose Skin: Skin is warm and dry. Psychiatric: She has a normal mood and affect. Nursing note and vitals reviewed. MDM Number of Diagnoses or Management Options BV (bacterial vaginosis): PID (acute pelvic inflammatory disease):  
 Diagnosis management comments: 42 yo female with multiple complaints 1. Headache- improved with migraine cocktail. No red flags or meningeal signs 2. Back pain- likely MSK in etiology 3. Urinary frequency- UA okay 4. Vaginal discharge/ pelvic pain- +clue cells, treat empirically for PID based on pelvic findings 
- f/u with PCP and OBGYN 
- Return to the emergency department for new/ worsening symptoms or other concerns Pelvic Exam 
Date/Time: 10/26/2018 1:27 AM 
Performed by: attending Type of exam performed: bimanual and speculum. External genitalia appearance: normal.   
Vaginal exam:  discharge. The amount of discharge was:  mild. The discharge was white. Cervical exam:  discharge from cervix. Bimanual exam:  left adenexal tenderness, right adenexal tenderness and uterine tenderness. Patient tolerance: Patient tolerated the procedure well with no immediate complications [place].  Minna Sotelo MD

## 2018-10-25 NOTE — ED NOTES
IV access obtained. Plan of care and time factors regarding results discussed with patient, pt verbalized good understanding. Call bell in reach.

## 2018-10-26 LAB
C TRACH DNA SPEC QL NAA+PROBE: NEGATIVE
N GONORRHOEA DNA SPEC QL NAA+PROBE: NEGATIVE
SAMPLE TYPE: NORMAL
SERVICE CMNT-IMP: NORMAL
SPECIMEN SOURCE: NORMAL

## 2018-10-26 NOTE — ED NOTES
Pt resting in position of comfort. Awaiting all results and further care orders. Call bell in reach.

## 2018-10-26 NOTE — DISCHARGE INSTRUCTIONS
Bacterial Vaginosis: Care Instructions  Your Care Instructions    Bacterial vaginosis is a type of vaginal infection. It is caused by excess growth of certain bacteria that are normally found in the vagina. Symptoms can include itching, swelling, pain when you urinate or have sex, and a gray or yellow discharge with a \"fishy\" odor. It is not considered an infection that is spread through sexual contact. Although symptoms can be annoying and uncomfortable, bacterial vaginosis does not usually cause other health problems. However, if you have it while you are pregnant, it can cause complications. While the infection may go away on its own, most doctors use antibiotics to treat it. You may have been prescribed pills or vaginal cream. With treatment, bacterial vaginosis usually clears up in 5 to 7 days. Follow-up care is a key part of your treatment and safety. Be sure to make and go to all appointments, and call your doctor if you are having problems. It's also a good idea to know your test results and keep a list of the medicines you take. How can you care for yourself at home? · Take your antibiotics as directed. Do not stop taking them just because you feel better. You need to take the full course of antibiotics. · Do not eat or drink anything that contains alcohol if you are taking metronidazole (Flagyl). · Keep using your medicine if you start your period. Use pads instead of tampons while using a vaginal cream or suppository. Tampons can absorb the medicine. · Wear loose cotton clothing. Do not wear nylon and other materials that hold body heat and moisture close to the skin. · Do not scratch. Relieve itching with a cold pack or a cool bath. · Do not wash your vaginal area more than once a day. Use plain water or a mild, unscented soap. Do not douche. When should you call for help?   Watch closely for changes in your health, and be sure to contact your doctor if:    · You have unexpected vaginal bleeding.     · You have a fever.     · You have new or increased pain in your vagina or pelvis.     · You are not getting better after 1 week.     · Your symptoms return after you finish the course of your medicine. Where can you learn more? Go to http://patricia-nan.info/. Christine Pena in the search box to learn more about \"Bacterial Vaginosis: Care Instructions. \"  Current as of: May 15, 2018  Content Version: 11.8  © 3856-5634 [employer]. Care instructions adapted under license by [employer] (which disclaims liability or warranty for this information). If you have questions about a medical condition or this instruction, always ask your healthcare professional. [address] any warranty or liability for your use of this information.

## 2018-10-26 NOTE — ED NOTES
The patient was discharged home by Dr. Marvin Ray in stable condition. The patient is alert and oriented, in no respiratory distress and discharge vital signs obtained. The patient's diagnosis, condition and treatment were explained. The patient expressed understanding. Two prescriptions given. No work/school note given. A discharge plan has been developed. A  was not involved in the process. Aftercare instructions were given. Pt ambulatory out of the ED.

## 2019-04-08 DIAGNOSIS — L30.9 ECZEMA, UNSPECIFIED TYPE: ICD-10-CM

## 2019-04-10 RX ORDER — LEVOCETIRIZINE DIHYDROCHLORIDE 5 MG/1
TABLET, FILM COATED ORAL
Qty: 30 TAB | Refills: 3 | Status: SHIPPED | OUTPATIENT
Start: 2019-04-10 | End: 2019-09-29

## 2019-07-08 ENCOUNTER — HOSPITAL ENCOUNTER (EMERGENCY)
Age: 39
Discharge: HOME | End: 2019-07-09
Attending: EMERGENCY MEDICINE
Payer: MEDICAID

## 2019-07-08 DIAGNOSIS — R10.84 ABDOMINAL PAIN, GENERALIZED: Primary | ICD-10-CM

## 2019-07-08 DIAGNOSIS — L73.9 FOLLICULITIS: ICD-10-CM

## 2019-07-08 LAB
BASOPHILS # BLD: 0 K/UL (ref 0–0.1)
BASOPHILS NFR BLD: 0 % (ref 0–1)
COMMENT, HOLDF: NORMAL
DIFFERENTIAL METHOD BLD: ABNORMAL
EOSINOPHIL # BLD: 0.4 K/UL (ref 0–0.4)
EOSINOPHIL NFR BLD: 3 % (ref 0–7)
ERYTHROCYTE [DISTWIDTH] IN BLOOD BY AUTOMATED COUNT: 19.2 % (ref 11.5–14.5)
HCT VFR BLD AUTO: 34.9 % (ref 35–47)
HGB BLD-MCNC: 10.3 G/DL (ref 11.5–16)
IMM GRANULOCYTES # BLD AUTO: 0.1 K/UL (ref 0–0.04)
IMM GRANULOCYTES NFR BLD AUTO: 1 % (ref 0–0.5)
LYMPHOCYTES # BLD: 3.1 K/UL (ref 0.8–3.5)
LYMPHOCYTES NFR BLD: 21 % (ref 12–49)
MCH RBC QN AUTO: 22.8 PG (ref 26–34)
MCHC RBC AUTO-ENTMCNC: 29.5 G/DL (ref 30–36.5)
MCV RBC AUTO: 77.2 FL (ref 80–99)
MONOCYTES # BLD: 1.1 K/UL (ref 0–1)
MONOCYTES NFR BLD: 7 % (ref 5–13)
NEUTS SEG # BLD: 10.2 K/UL (ref 1.8–8)
NEUTS SEG NFR BLD: 68 % (ref 32–75)
NRBC # BLD: 0 K/UL (ref 0–0.01)
NRBC BLD-RTO: 0 PER 100 WBC
PLATELET # BLD AUTO: 404 K/UL (ref 150–400)
PMV BLD AUTO: 10.9 FL (ref 8.9–12.9)
RBC # BLD AUTO: 4.52 M/UL (ref 3.8–5.2)
SAMPLES BEING HELD,HOLD: NORMAL
WBC # BLD AUTO: 15 K/UL (ref 3.6–11)

## 2019-07-08 PROCEDURE — 85025 COMPLETE CBC W/AUTO DIFF WBC: CPT

## 2019-07-08 PROCEDURE — 81001 URINALYSIS AUTO W/SCOPE: CPT

## 2019-07-08 PROCEDURE — 80053 COMPREHEN METABOLIC PANEL: CPT

## 2019-07-08 PROCEDURE — 83690 ASSAY OF LIPASE: CPT

## 2019-07-08 PROCEDURE — 99284 EMERGENCY DEPT VISIT MOD MDM: CPT

## 2019-07-08 PROCEDURE — 96375 TX/PRO/DX INJ NEW DRUG ADDON: CPT

## 2019-07-08 PROCEDURE — 96374 THER/PROPH/DIAG INJ IV PUSH: CPT

## 2019-07-08 PROCEDURE — 82150 ASSAY OF AMYLASE: CPT

## 2019-07-08 PROCEDURE — 36415 COLL VENOUS BLD VENIPUNCTURE: CPT

## 2019-07-09 ENCOUNTER — APPOINTMENT (OUTPATIENT)
Dept: GENERAL RADIOLOGY | Age: 39
End: 2019-07-09
Attending: EMERGENCY MEDICINE
Payer: MEDICAID

## 2019-07-09 ENCOUNTER — APPOINTMENT (OUTPATIENT)
Dept: CT IMAGING | Age: 39
End: 2019-07-09
Attending: EMERGENCY MEDICINE
Payer: MEDICAID

## 2019-07-09 VITALS
HEIGHT: 68 IN | WEIGHT: 280 LBS | RESPIRATION RATE: 20 BRPM | HEART RATE: 87 BPM | SYSTOLIC BLOOD PRESSURE: 143 MMHG | BODY MASS INDEX: 42.44 KG/M2 | OXYGEN SATURATION: 100 % | DIASTOLIC BLOOD PRESSURE: 83 MMHG | TEMPERATURE: 98.7 F

## 2019-07-09 LAB
ALBUMIN SERPL-MCNC: 3.4 G/DL (ref 3.5–5)
ALBUMIN/GLOB SERPL: 0.8 {RATIO} (ref 1.1–2.2)
ALP SERPL-CCNC: 75 U/L (ref 45–117)
ALT SERPL-CCNC: 14 U/L (ref 12–78)
AMYLASE SERPL-CCNC: 45 U/L (ref 25–115)
ANION GAP SERPL CALC-SCNC: 8 MMOL/L (ref 5–15)
APPEARANCE UR: CLEAR
AST SERPL-CCNC: 8 U/L (ref 15–37)
BACTERIA URNS QL MICRO: NEGATIVE /HPF
BILIRUB SERPL-MCNC: 0.3 MG/DL (ref 0.2–1)
BILIRUB UR QL: NEGATIVE
BUN SERPL-MCNC: 9 MG/DL (ref 6–20)
BUN/CREAT SERPL: 13 (ref 12–20)
CALCIUM SERPL-MCNC: 8.3 MG/DL (ref 8.5–10.1)
CHLORIDE SERPL-SCNC: 109 MMOL/L (ref 97–108)
CO2 SERPL-SCNC: 25 MMOL/L (ref 21–32)
COLOR UR: NORMAL
CREAT SERPL-MCNC: 0.69 MG/DL (ref 0.55–1.02)
EPITH CASTS URNS QL MICRO: NORMAL /LPF
GLOBULIN SER CALC-MCNC: 4.5 G/DL (ref 2–4)
GLUCOSE SERPL-MCNC: 82 MG/DL (ref 65–100)
GLUCOSE UR STRIP.AUTO-MCNC: NEGATIVE MG/DL
HGB UR QL STRIP: NEGATIVE
HYALINE CASTS URNS QL MICRO: NORMAL /LPF (ref 0–5)
KETONES UR QL STRIP.AUTO: NEGATIVE MG/DL
LEUKOCYTE ESTERASE UR QL STRIP.AUTO: NEGATIVE
LIPASE SERPL-CCNC: 86 U/L (ref 73–393)
NITRITE UR QL STRIP.AUTO: NEGATIVE
PH UR STRIP: 6.5 [PH] (ref 5–8)
POTASSIUM SERPL-SCNC: 3.7 MMOL/L (ref 3.5–5.1)
PROT SERPL-MCNC: 7.9 G/DL (ref 6.4–8.2)
PROT UR STRIP-MCNC: NEGATIVE MG/DL
RBC #/AREA URNS HPF: NORMAL /HPF (ref 0–5)
SODIUM SERPL-SCNC: 142 MMOL/L (ref 136–145)
SP GR UR REFRACTOMETRY: 1.02 (ref 1–1.03)
UA: UC IF INDICATED,UAUC: NORMAL
UROBILINOGEN UR QL STRIP.AUTO: 1 EU/DL (ref 0.2–1)
WBC URNS QL MICRO: NORMAL /HPF (ref 0–4)

## 2019-07-09 PROCEDURE — 74011250636 HC RX REV CODE- 250/636: Performed by: EMERGENCY MEDICINE

## 2019-07-09 PROCEDURE — 74019 RADEX ABDOMEN 2 VIEWS: CPT

## 2019-07-09 PROCEDURE — 74176 CT ABD & PELVIS W/O CONTRAST: CPT

## 2019-07-09 RX ORDER — ONDANSETRON 2 MG/ML
8 INJECTION INTRAMUSCULAR; INTRAVENOUS
Status: COMPLETED | OUTPATIENT
Start: 2019-07-09 | End: 2019-07-09

## 2019-07-09 RX ORDER — CEPHALEXIN 500 MG/1
500 CAPSULE ORAL 3 TIMES DAILY
Qty: 30 CAP | Refills: 0 | Status: SHIPPED | OUTPATIENT
Start: 2019-07-09 | End: 2019-07-19

## 2019-07-09 RX ORDER — IBUPROFEN 800 MG/1
800 TABLET ORAL
Qty: 30 TAB | Refills: 0 | Status: SHIPPED | OUTPATIENT
Start: 2019-07-09 | End: 2019-09-29

## 2019-07-09 RX ORDER — KETOROLAC TROMETHAMINE 30 MG/ML
INJECTION, SOLUTION INTRAMUSCULAR; INTRAVENOUS
Status: DISCONTINUED
Start: 2019-07-09 | End: 2019-07-09 | Stop reason: HOSPADM

## 2019-07-09 RX ORDER — KETOROLAC TROMETHAMINE 30 MG/ML
30 INJECTION, SOLUTION INTRAMUSCULAR; INTRAVENOUS
Status: COMPLETED | OUTPATIENT
Start: 2019-07-09 | End: 2019-07-09

## 2019-07-09 RX ADMIN — KETOROLAC TROMETHAMINE 30 MG: 30 INJECTION, SOLUTION INTRAMUSCULAR at 01:20

## 2019-07-09 RX ADMIN — ONDANSETRON 8 MG: 2 INJECTION INTRAMUSCULAR; INTRAVENOUS at 01:20

## 2019-07-09 NOTE — ED PROVIDER NOTES
35 y.o. female with past medical history significant for anxiety, asthma, HTN who presents from [facility] with chief complaint of abdominal pain. Pt states for the past 2 days she has had persistent pain across her lower abdomen. She also reports noticing a \"bump\" located in her right lower abdomen a couple days ago. She denies taking any medications for relief of symptoms or any other relieving or exacerbating factors. Pt additionally complains of dizziness and intermittent blurred vision for the past 3 hours. She further notes she has not taken her blood pressure medication in the past 2 weeks. Pt does not wear glasses at baseline. She denies any history of DM. Pt specifically denies any fever, chills, headache, cough, congestion, shortness of breath, chest pain, vaginal discharge, vaginal bleeding, nausea, vomiting, diarrhea, dysuria, or urinary frequency. There are no other acute medical concerns at this time. PSHx: Significant for cholecystectomy, tubal ligation  Social Hx: positive tobacco use (occasional), negative EtOH use, negative Illicit Drug use    PCP: Lodema Gitelman, DO    Note written by Jarrod Linton, as dictated by Dalton Macedo MD 11:07 PM    The history is provided by the patient. No  was used.         Past Medical History:   Diagnosis Date    Anxiety     Asthma     Hypertension     Rotator cuff tear        Past Surgical History:   Procedure Laterality Date    HX CHOLECYSTECTOMY      HX CYST REMOVAL      HX TUBAL LIGATION           Family History:   Problem Relation Age of Onset    Hypertension Mother     Hypertension Sister     Cancer Maternal Aunt        Social History     Socioeconomic History    Marital status: SINGLE     Spouse name: Not on file    Number of children: Not on file    Years of education: Not on file    Highest education level: Not on file   Occupational History    Not on file   Social Needs    Financial resource strain: Not on file    Food insecurity:     Worry: Not on file     Inability: Not on file    Transportation needs:     Medical: Not on file     Non-medical: Not on file   Tobacco Use    Smoking status: Former Smoker    Smokeless tobacco: Never Used   Substance and Sexual Activity    Alcohol use: No     Alcohol/week: 0.0 oz    Drug use: No    Sexual activity: Yes     Partners: Male     Birth control/protection: Condom   Lifestyle    Physical activity:     Days per week: Not on file     Minutes per session: Not on file    Stress: Not on file   Relationships    Social connections:     Talks on phone: Not on file     Gets together: Not on file     Attends Latter day service: Not on file     Active member of club or organization: Not on file     Attends meetings of clubs or organizations: Not on file     Relationship status: Not on file    Intimate partner violence:     Fear of current or ex partner: Not on file     Emotionally abused: Not on file     Physically abused: Not on file     Forced sexual activity: Not on file   Other Topics Concern    Not on file   Social History Narrative    Not on file         ALLERGIES: Bactrim [sulfamethoprim ds]    Review of Systems   Constitutional: Negative for chills and fever. HENT: Negative for congestion. Eyes: Positive for visual disturbance. Respiratory: Negative for cough and shortness of breath. Cardiovascular: Negative for chest pain. Gastrointestinal: Positive for abdominal pain. Negative for diarrhea, nausea and vomiting. Genitourinary: Negative for dysuria, frequency, vaginal bleeding and vaginal discharge. Skin:        Bump RLQ   Neurological: Positive for dizziness. Negative for headaches. All other systems reviewed and are negative. Vitals:    07/08/19 2205   BP: 170/86   Pulse: 97   Resp: 19   Temp: 99 °F (37.2 °C)   SpO2: 96%   Weight: 127 kg (280 lb)   Height: 5' 8\" (1.727 m)            Physical Exam   Nursing note and vitals reviewed. CONSTITUTIONAL: Well-appearing; well-nourished; in no apparent distress  HEAD: Normocephalic; atraumatic  EYES: PERRL; EOM intact; conjunctiva and sclera are clear bilaterally. ENT: No rhinorrhea; normal pharynx with no tonsillar hypertrophy; mucous membranes pink/moist, no erythema, no exudate. NECK: Supple; non-tender; no cervical lymphadenopathy  CARD: Normal S1, S2; no murmurs, rubs, or gallops. Regular rate and rhythm. RESP: Normal respiratory effort; breath sounds clear and equal bilaterally; no wheezes, rhonchi, or rales. ABD: Normal bowel sounds; non-distended; non-tender; no palpable organomegaly, no masses, no bruits. Back Exam: Normal inspection; no vertebral point tenderness, no CVA tenderness. Normal range of motion. EXT: Normal ROM in all four extremities; non-tender to palpation; no swelling or deformity; distal pulses are normal, no edema. SKIN: Warm; dry; no rash. NEURO:Alert and oriented x 3, coherent, CHRISTINE-XII grossly intact, sensory and motor are non-focal.      MDM  Number of Diagnoses or Management Options  Abdominal pain, generalized: Folliculitis:   Diagnosis management comments: Assessment: Suprapubic abdominal pain, rule out bowel obstruction/colitis or appendicitis; folliculitis in the suprapubic area/ nxiety    Plan: Lab/ IV fluid/ antiemetics and analgesia/ Abdominal series x-ray/ CT scan of the abdomen and pelvis/ p.o. challenge/ Monitor and Reevaluate.          Amount and/or Complexity of Data Reviewed  Clinical lab tests: ordered and reviewed  Tests in the radiology section of CPT®: ordered and reviewed  Tests in the medicine section of CPT®: reviewed and ordered  Discussion of test results with the performing providers: yes  Decide to obtain previous medical records or to obtain history from someone other than the patient: yes  Obtain history from someone other than the patient: yes  Review and summarize past medical records: yes  Discuss the patient with other providers: yes  Independent visualization of images, tracings, or specimens: yes    Risk of Complications, Morbidity, and/or Mortality  Presenting problems: moderate  Diagnostic procedures: moderate  Management options: moderate           Procedures    Progress Note:   Pt has been reexamined by Tawanna Gutierrez MD. Pt is feeling much better. Symptoms have improved. All available results have been reviewed with pt and any available family. Pt understands sx, dx, and tx in ED. Care plan has been outlined and questions have been answered. Pt is ready to go home. Will send home on Abdominal pain instructions and prescription of naproxen, and Zofran. Outpatient referral with PCP as needed. Written by Tawanna Gutierrez MD,8:09 AM    .   .

## 2019-07-09 NOTE — DISCHARGE INSTRUCTIONS
Patient Education        Abdominal Pain: Care Instructions  Your Care Instructions    Abdominal pain has many possible causes. Some aren't serious and get better on their own in a few days. Others need more testing and treatment. If your pain continues or gets worse, you need to be rechecked and may need more tests to find out what is wrong. You may need surgery to correct the problem. Don't ignore new symptoms, such as fever, nausea and vomiting, urination problems, pain that gets worse, and dizziness. These may be signs of a more serious problem. Your doctor may have recommended a follow-up visit in the next 8 to 12 hours. If you are not getting better, you may need more tests or treatment. The doctor has checked you carefully, but problems can develop later. If you notice any problems or new symptoms, get medical treatment right away. Follow-up care is a key part of your treatment and safety. Be sure to make and go to all appointments, and call your doctor if you are having problems. It's also a good idea to know your test results and keep a list of the medicines you take. How can you care for yourself at home? · Rest until you feel better. · To prevent dehydration, drink plenty of fluids, enough so that your urine is light yellow or clear like water. Choose water and other caffeine-free clear liquids until you feel better. If you have kidney, heart, or liver disease and have to limit fluids, talk with your doctor before you increase the amount of fluids you drink. · If your stomach is upset, eat mild foods, such as rice, dry toast or crackers, bananas, and applesauce. Try eating several small meals instead of two or three large ones. · Wait until 48 hours after all symptoms have gone away before you have spicy foods, alcohol, and drinks that contain caffeine. · Do not eat foods that are high in fat. · Avoid anti-inflammatory medicines such as aspirin, ibuprofen (Advil, Motrin), and naproxen (Aleve). These can cause stomach upset. Talk to your doctor if you take daily aspirin for another health problem. When should you call for help? Call 911 anytime you think you may need emergency care. For example, call if:    · You passed out (lost consciousness).     · You pass maroon or very bloody stools.     · You vomit blood or what looks like coffee grounds.     · You have new, severe belly pain.    Call your doctor now or seek immediate medical care if:    · Your pain gets worse, especially if it becomes focused in one area of your belly.     · You have a new or higher fever.     · Your stools are black and look like tar, or they have streaks of blood.     · You have unexpected vaginal bleeding.     · You have symptoms of a urinary tract infection. These may include:  ? Pain when you urinate. ? Urinating more often than usual.  ? Blood in your urine.     · You are dizzy or lightheaded, or you feel like you may faint.    Watch closely for changes in your health, and be sure to contact your doctor if:    · You are not getting better after 1 day (24 hours). Where can you learn more? Go to http://patriciaEnforcer eCoachingnan.info/. Enter T760 in the search box to learn more about \"Abdominal Pain: Care Instructions. \"  Current as of: September 23, 2018  Content Version: 11.9  © 3271-3108 [employer]. Care instructions adapted under license by [employer] (which disclaims liability or warranty for this information). If you have questions about a medical condition or this instruction, always ask your healthcare professional. [place] [address] any warranty or liability for your use of this information. Patient Education        Folliculitis: Care Instructions  Your Care Instructions    Folliculitis (say \"npu-FIV-zdg-LY-tus\") is an infection of the pouches (follicles) in the skin where hair grows.  It can occur on any part of the body, but it is most common on the scalp, face, armpits, and groin. Bacteria, such as those found in a hot tub, can cause folliculitis. Folliculitis begins as a red, tender area near a strand of hair. The skin can itch or burn and may drain pus or blood. Sometimes folliculitis can lead to more serious skin infections. Your doctor usually can treat mild folliculitis with an antibiotic cream or ointment. If you have folliculitis on your scalp, you may use a shampoo that kills bacteria. Antibiotics you take as pills can treat infections deeper in the skin. For stubborn cases of folliculitis, laser treatment may be an option. Laser treatment uses strong beams of light to destroy the hair follicle. But hair will no longer grow in the treated area. Follow-up care is a key part of your treatment and safety. Be sure to make and go to all appointments, and call your doctor if you are having problems. It's also a good idea to know your test results and keep a list of the medicines you take. How can you care for yourself at home? · Take your medicine exactly as prescribed. If your doctor prescribed antibiotics, take them as directed. Do not stop taking them just because you feel better. You need to take the full course of antibiotics. · Use a soap that kills bacteria to wash the infected area. If your scalp or beard is infected, use a shampoo with selenium or propylene glycol. Be careful. Do not scrub too long or too hard. · Mix 1 1/3 cup warm water and 1 tablespoon vinegar. Soak a cloth in the mixture, and place it over the infected skin until it cools off (usually 5 to 10 minutes). You can do this 3 to 6 times a day. · Do not share your razor, towel, or washcloth. That can spread folliculitis. · Use a new blade in your razor each time you shave to keep from re-infecting your skin. · If you tend to get folliculitis, avoid using hot tubs. They can contain bacteria that cause folliculitis. When should you call for help?   Call your doctor now or seek immediate medical care if:    · You have symptoms of infection, such as:  ? Increased pain, swelling, warmth, or redness. ? Red streaks leading from the area. ? Pus draining from the area. ? A fever.    Watch closely for changes in your health, and be sure to contact your doctor if:    · You do not get better as expected. Where can you learn more? Go to http://patricia-nan.info/. Enter M257 in the search box to learn more about \"Folliculitis: Care Instructions. \"  Current as of: April 17, 2018  Content Version: 11.9  © 3259-4449 [employer]. Care instructions adapted under license by [employer] (which disclaims liability or warranty for this information). If you have questions about a medical condition or this instruction, always ask your healthcare professional. [address] any warranty or liability for your use of this information.

## 2019-07-21 ENCOUNTER — HOSPITAL ENCOUNTER (EMERGENCY)
Age: 39
Discharge: HOME | End: 2019-07-21
Attending: EMERGENCY MEDICINE
Payer: MEDICAID

## 2019-07-21 ENCOUNTER — HOSPITAL ENCOUNTER (EMERGENCY)
Dept: GENERAL RADIOLOGY | Age: 39
Discharge: HOME | End: 2019-07-21
Attending: EMERGENCY MEDICINE
Payer: MEDICAID

## 2019-07-21 VITALS
WEIGHT: 275 LBS | OXYGEN SATURATION: 94 % | HEIGHT: 68 IN | SYSTOLIC BLOOD PRESSURE: 137 MMHG | RESPIRATION RATE: 18 BRPM | DIASTOLIC BLOOD PRESSURE: 81 MMHG | TEMPERATURE: 98.2 F | HEART RATE: 92 BPM | BODY MASS INDEX: 41.68 KG/M2

## 2019-07-21 DIAGNOSIS — S82.842A CLOSED BIMALLEOLAR FRACTURE OF LEFT ANKLE, INITIAL ENCOUNTER: Primary | ICD-10-CM

## 2019-07-21 PROCEDURE — 96372 THER/PROPH/DIAG INJ SC/IM: CPT

## 2019-07-21 PROCEDURE — 75810000053 HC SPLINT APPLICATION

## 2019-07-21 PROCEDURE — 73610 X-RAY EXAM OF ANKLE: CPT

## 2019-07-21 PROCEDURE — 99284 EMERGENCY DEPT VISIT MOD MDM: CPT

## 2019-07-21 PROCEDURE — 73590 X-RAY EXAM OF LOWER LEG: CPT

## 2019-07-21 PROCEDURE — 74011250636 HC RX REV CODE- 250/636: Performed by: EMERGENCY MEDICINE

## 2019-07-21 RX ORDER — MORPHINE SULFATE 4 MG/ML
8 INJECTION INTRAVENOUS ONCE
Status: COMPLETED | OUTPATIENT
Start: 2019-07-21 | End: 2019-07-21

## 2019-07-21 RX ORDER — KETOROLAC TROMETHAMINE 30 MG/ML
30 INJECTION, SOLUTION INTRAMUSCULAR; INTRAVENOUS
Status: COMPLETED | OUTPATIENT
Start: 2019-07-21 | End: 2019-07-21

## 2019-07-21 RX ORDER — HYDROCODONE BITARTRATE AND ACETAMINOPHEN 5; 325 MG/1; MG/1
1-2 TABLET ORAL
Qty: 18 TAB | Refills: 0 | Status: SHIPPED | OUTPATIENT
Start: 2019-07-21 | End: 2019-07-24

## 2019-07-21 RX ADMIN — MORPHINE SULFATE 8 MG: 4 INJECTION, SOLUTION INTRAMUSCULAR; INTRAVENOUS at 12:05

## 2019-07-21 RX ADMIN — KETOROLAC TROMETHAMINE 30 MG: 30 INJECTION, SOLUTION INTRAMUSCULAR at 11:33

## 2019-07-21 NOTE — ED TRIAGE NOTES
Pt arrives by EMS with the c/o of \"slipping on water and down steps\" denies hitting head and no loss of consciousness, pt denies any neck pain, pt c/c of left leg pain from knee down to ankle. Some swelling noted to ankle no obvious deformities.

## 2019-07-21 NOTE — ED PROVIDER NOTES
Vee Mckenzie is a 35 y.o. female who presents ambulatory to the ED with a c/o left leg pain, ankle pain/swelling s/p GLF today. Pt currently rates her pain at 10/10 in severity and states that her pain goes all the way down to her left toes. Pt denies any LOC or head injury from the fall. Pt notes that she slipped on some water that was on the top stair, causing her to fall down numerous steps. Pt states that she is unable to bear any weight or ambulate due to the pain in her left leg and left ankle. Pt specifically denies chest pain, shortness of breath, n/v,d , fever, chills, numbness, tingling, abdominal pain, back pain, cough, leg swelling, dizziness or any other acute sx. Pt denies any recent travel, known sick contacts, or recent illness. PCP: Andre Fall DO  PMHx significant for: Anxiety, Asthma, HTN  PSHx significant for: Cyst Removal, Cholecystectomy, Tubal Ligation  Social Hx: Tobacco: Current some day smoker EtOH: none Illicit drug use: none    There are no further complaints or symptoms at this time. Signed by: roberto Perez for Toño Hammond MD on July 21st, 2019 at 11:10 AM.    The history is provided by the patient, a relative and medical records. No  was used.         Past Medical History:   Diagnosis Date    Anxiety     Asthma     Hypertension     Rotator cuff tear        Past Surgical History:   Procedure Laterality Date    HX CHOLECYSTECTOMY      HX CYST REMOVAL      HX TUBAL LIGATION           Family History:   Problem Relation Age of Onset    Hypertension Mother     Hypertension Sister     Cancer Maternal Aunt        Social History     Socioeconomic History    Marital status: SINGLE     Spouse name: Not on file    Number of children: Not on file    Years of education: Not on file    Highest education level: Not on file   Occupational History    Not on file   Social Needs    Financial resource strain: Not on file   [place] insecurity:     Worry: Not on file     Inability: Not on file    Transportation needs:     Medical: Not on file     Non-medical: Not on file   Tobacco Use    Smoking status: Current Some Day Smoker    Smokeless tobacco: Never Used   Substance and Sexual Activity    Alcohol use: No     Alcohol/week: 0.0 standard drinks    Drug use: No    Sexual activity: Yes     Partners: Male     Birth control/protection: Condom   Lifestyle    Physical activity:     Days per week: Not on file     Minutes per session: Not on file    Stress: Not on file   Relationships    Social connections:     Talks on phone: Not on file     Gets together: Not on file     Attends Christianity service: Not on file     Active member of club or organization: Not on file     Attends meetings of clubs or organizations: Not on file     Relationship status: Not on file    Intimate partner violence:     Fear of current or ex partner: Not on file     Emotionally abused: Not on file     Physically abused: Not on file     Forced sexual activity: Not on file   Other Topics Concern    Not on file   Social History Narrative    Not on file         ALLERGIES: Bactrim [sulfamethoprim ds]    Review of Systems   Constitutional: Negative for chills and fever. Respiratory: Negative for cough and shortness of breath. Cardiovascular: Negative for chest pain and leg swelling. Gastrointestinal: Negative for abdominal pain, diarrhea, nausea and vomiting. Genitourinary: Negative for dysuria and hematuria. Musculoskeletal: Positive for arthralgias (left ankle pain, swelling). Neurological: Negative for headaches. All other systems reviewed and are negative. Vitals:    07/21/19 1056   BP: (!) 133/101   Pulse: 92   Resp: 18   Temp: 98.2 °F (36.8 °C)   SpO2: 96%   Weight: 124.7 kg (275 lb)   Height: 5' 8\" (1.727 m)            Physical Exam   Constitutional: She appears well-developed and well-nourished. No distress.    HENT:   Head: Normocephalic and atraumatic. Left Ear: External ear normal.   Nose: Nose normal.   Mouth/Throat: Oropharynx is clear and moist. No oropharyngeal exudate. Eyes: Pupils are equal, round, and reactive to light. Conjunctivae and EOM are normal. Right eye exhibits no discharge. Left eye exhibits no discharge. No scleral icterus. Neck: Normal range of motion. No tracheal deviation present. Cardiovascular: Normal rate, regular rhythm, normal heart sounds and intact distal pulses. Pulmonary/Chest: Effort normal and breath sounds normal. No stridor. She has no wheezes. She has no rales. Abdominal: Soft. She exhibits no distension and no mass. There is no tenderness. There is no rebound and no guarding. Musculoskeletal: She exhibits edema. Left ankle: She exhibits decreased range of motion and swelling. Tenderness. Lateral malleolus and medial malleolus tenderness found. Lymphadenopathy:     She has no cervical adenopathy. Neurological: She is alert. Skin: Skin is warm and dry. She is not diaphoretic. Psychiatric: She has a normal mood and affect. Nursing note and vitals reviewed. MDM       Procedures        3:02 PM - after she was discharged, I discussed her pain med rx with the [facility] Pharmacist      Old chart reviewed - last seen July 8 this month for abd pain. Reviewed xray images    Has bimall ankle fx  IM morphine for pain    Short leg splint applied by nursing. Padding. Posterior/sugar tong. Distal nv exam is normal after application.   Checked by gary Leiva DO

## 2019-07-21 NOTE — DISCHARGE INSTRUCTIONS
Patient Education        Learning About How to Use Crutches  Your Care Instructions  Crutches can help you walk when you have an injured hip, leg, knee, ankle, or foot. Your doctor will tell you how much weight--if any--you can put on your leg. Be sure your crutches fit you. When you stand up in your normal posture, there should be space for two or three fingers between the top of the crutch and your armpit. When you let your hands hang down, the hand  should be at your wrists. When you put your hands on the hand , your elbows should be slightly bent. To stay safe when using crutches:  · Look straight ahead, not down at your feet. · Clear away small rugs, cords, or anything else that could cause you to trip, slip, or fall. · Be very careful around pets and small children. They can get in your path when you least expect it. · Be sure the rubber tips on your crutches are clean and in good condition to help prevent slipping. · Avoid slick conditions, such as wet floors and snowy or icy driveways. In bad weather, be especially careful on curbs and steps. How to use crutches  Getting ready to walk    1. Bend your elbows slightly. Press the padded top parts of the crutches against your sides, under your armpits. 2. If you have been told not to put any weight on your injured leg, keep that leg bent and off the ground. How to walk with crutches when you can put weight on the injured leg    1. Put both crutches about 12 inches in front of you. The crutches and your feet should form a triangle. Hold the crutches close enough to your body so you can push straight down on them, but leave room between the crutches for your body to pass through. Don't lean forward to reach farther. 2. Put your weight on the handgrips, not on the pads under your arms. Constant pressure against your underarms can cause numbness. 3. Move your weak or injured leg forward so it's almost even with the crutches.   4. Bring your good leg up, so it's even with your weak or injured leg. 5. Move your crutches about 12 inches in front of you, and start the next step. Sitting down    1. To sit, back up to the chair. Use one hand to hold both crutches by the handgrips, beside your injured leg. With the other hand, hold onto the seat and slowly lower yourself onto the chair. 2. Lay the crutches on the ground near your chair. If you prop them up, they may fall over. Getting up from a chair    1. To get up from a chair,  the crutches and put them in one hand beside your injured leg. 2. Put your weight on the handgrips of the crutches and on your strong leg to stand up. How to go up and down stairs using crutches    1. Stand near the edge of the stairs. 2. Go up or down the stairs. ? If you are going up, step up with your stronger leg. Then bring the crutches and your weak or injured leg to the upper step. ? If you are going down, put your crutches and your weak or injured leg on the lower step. Then bring your stronger leg down to the lower step. Remember \"up with the good, and down with the bad\" to help you lead with the correct leg. Crutches: How to go up and down stairs with handrails    1. Stand near the edge of the stairs. 2. Put both crutches under the arm opposite the handrail. 3. Use the hand opposite the handrail to hold both crutches by the handgrips. 4. Hold onto the handrail as you go up or down. 5. Go up or down the stairs. ? If you are going up, step up with your stronger leg. Then bring the crutches and your weak or injured leg to the upper step. ? If you are going down, put your crutches and your weak or injured leg on the lower step. Then bring your stronger leg down to the lower step. Remember \"up with the good, down with the bad\" to help you lead with the correct leg. Follow-up care is a key part of your treatment and safety.  Be sure to make and go to all appointments, and call your doctor if you are having problems. It's also a good idea to know your test results and keep a list of the medicines you take. Where can you learn more? Go to http://patricia-nan.info/. Enter B156 in the search box to learn more about \"Learning About How to Use Crutches. \"  Current as of: September 20, 2018  Content Version: 12.1  © 0716-0684 [employer]. Care instructions adapted under license by [employer] (which disclaims liability or warranty for this information). If you have questions about a medical condition or this instruction, always ask your healthcare professional. [place] [address] any warranty or liability for your use of this information. Patient Education        Broken Ankle: Care Instructions  Your Care Instructions    An ankle may break (fracture) during sports, a fall, or other accidents. Fractures can range from a small, hairline crack, to a bone or bones broken into two or more pieces. Your treatment depends on how bad the break is. Your doctor may have put your ankle in a splint or cast to allow it to heal or to keep it stable until you see another doctor. It may take weeks or months for your ankle to heal. You can help your ankle heal with some care at home. You heal best when you take good care of yourself. Eat a variety of healthy foods, and don't smoke. You may have had a sedative to help you relax. You may be unsteady after having sedation. It can take a few hours for the medicine's effects to wear off. Common side effects of sedation include nausea, vomiting, and feeling sleepy or tired. The doctor has checked you carefully, but problems can develop later. If you notice any problems or new symptoms,  get medical treatment right away. Follow-up care is a key part of your treatment and safety. Be sure to make and go to all appointments, and call your doctor if you are having problems.  It's also a good idea to know your test results and keep a list of the medicines you take. How can you care for yourself at home? · If the doctor gave you a sedative:  ? For 24 hours, don't do anything that requires attention to detail, such as going to work, making important decisions, or signing any legal documents. It takes time for the medicine's effects to completely wear off.  ? For your safety, do not drive or operate any machinery that could be dangerous. Wait until the medicine wears off and you can think clearly and react easily. · Put ice or a cold pack on your ankle for 10 to 20 minutes at a time. Try to do this every 1 to 2 hours for the next 3 days (when you are awake). Put a thin cloth between the ice and your cast or splint. Keep your cast or splint dry. · Follow the cast care instructions your doctor gives you. If you have a splint, do not take it off unless your doctor tells you to. · Be safe with medicines. Take pain medicines exactly as directed. ? If the doctor gave you a prescription medicine for pain, take it as prescribed. ? If you are not taking a prescription pain medicine, ask your doctor if you can take an over-the-counter medicine. · Prop up your leg on pillows in the first few days after the injury. Keep the ankle higher than the level of your heart. This will help reduce swelling. · Do not put weight on your ankle unless your doctor tells you to. Use crutches to walk. · Follow instructions for exercises to keep your leg strong. · Wiggle your toes often to reduce swelling and stiffness. When should you call for help? Call 911 anytime you think you may need emergency care.  For example, call if:    · You have chest pain, are short of breath, or you cough up blood.     · You are very sleepy and you have trouble waking up.    Call your doctor now or seek immediate medical care if:    · You have new or worse nausea or vomiting.     · You have new or worse pain.     · Your foot is cool or pale or changes color.     · You have tingling, weakness, or numbness in your toes.     · Your cast or splint feels too tight.     · You have signs of a blood clot in your leg (called a deep vein thrombosis), such as:  ? Pain in your calf, back of the knee, thigh, or groin. ? Redness or swelling in your leg.    Watch closely for changes in your health, and be sure to contact your doctor if:    · You have a problem with your splint or cast.     · You do not get better as expected. Where can you learn more? Go to http://patricia-nan.info/. Enter P763 in the search box to learn more about \"Broken Ankle: Care Instructions. \"  Current as of: September 20, 2018  Content Version: 12.1  © 9693-8184 [employer]. Care instructions adapted under license by [employer] (which disclaims liability or warranty for this information). If you have questions about a medical condition or this instruction, always ask your healthcare professional. [address] any warranty or liability for your use of this information.

## 2019-09-29 ENCOUNTER — HOSPITAL ENCOUNTER (EMERGENCY)
Age: 39
Discharge: HOME | End: 2019-09-29
Attending: EMERGENCY MEDICINE
Payer: MEDICAID

## 2019-09-29 VITALS
WEIGHT: 293 LBS | BODY MASS INDEX: 48.5 KG/M2 | TEMPERATURE: 98.2 F | OXYGEN SATURATION: 96 % | RESPIRATION RATE: 22 BRPM | SYSTOLIC BLOOD PRESSURE: 169 MMHG | HEART RATE: 95 BPM | DIASTOLIC BLOOD PRESSURE: 92 MMHG

## 2019-09-29 DIAGNOSIS — S16.1XXA NECK STRAIN, INITIAL ENCOUNTER: Primary | ICD-10-CM

## 2019-09-29 PROCEDURE — 74011250637 HC RX REV CODE- 250/637: Performed by: EMERGENCY MEDICINE

## 2019-09-29 PROCEDURE — 99283 EMERGENCY DEPT VISIT LOW MDM: CPT

## 2019-09-29 RX ORDER — LIDOCAINE 4 G/100G
PATCH TOPICAL
Qty: 6 PATCH | Refills: 0 | Status: SHIPPED | OUTPATIENT
Start: 2019-09-29 | End: 2021-07-26

## 2019-09-29 RX ORDER — NAPROXEN 500 MG/1
500 TABLET ORAL
Qty: 20 TAB | Refills: 0 | Status: SHIPPED | OUTPATIENT
Start: 2019-09-29 | End: 2021-07-26

## 2019-09-29 RX ORDER — NAPROXEN 250 MG/1
TABLET ORAL
Status: DISCONTINUED
Start: 2019-09-29 | End: 2019-09-29 | Stop reason: HOSPADM

## 2019-09-29 RX ORDER — NAPROXEN 250 MG/1
500 TABLET ORAL
Status: COMPLETED | OUTPATIENT
Start: 2019-09-29 | End: 2019-09-29

## 2019-09-29 RX ORDER — METHOCARBAMOL 750 MG/1
1500 TABLET, FILM COATED ORAL 4 TIMES DAILY
Qty: 24 TAB | Refills: 0 | Status: SHIPPED | OUTPATIENT
Start: 2019-09-29 | End: 2019-10-02

## 2019-09-29 RX ADMIN — NAPROXEN 500 MG: 250 TABLET ORAL at 01:07

## 2019-09-29 NOTE — DISCHARGE INSTRUCTIONS
Patient Education        Neck Strain: Care Instructions  Your Care Instructions    You have strained the muscles and ligaments in your neck. A sudden, awkward movement can strain the neck. This often occurs with falls or car accidents or during certain sports. Everyday activities like working on a computer or sleeping can also cause neck strain if they force you to hold your neck in an awkward position for a long time. It is common for neck pain to get worse for a day or two after an injury, but it should start to feel better after that. You may have more pain and stiffness for several days before it gets better. This is expected. It may take a few weeks or longer for it to heal completely. Good home treatment can help you get better faster and avoid future neck problems. Follow-up care is a key part of your treatment and safety. Be sure to make and go to all appointments, and call your doctor if you are having problems. It's also a good idea to know your test results and keep a list of the medicines you take. How can you care for yourself at home? · If you were given a neck brace (cervical collar) to limit neck motion, wear it as instructed for as many days as your doctor tells you to. Do not wear it longer than you were told to. Wearing a brace for too long can make neck stiffness worse and weaken the neck muscles. · You can try using heat or ice to see if it helps. ? Try using a heating pad on a low or medium setting for 15 to 20 minutes every 2 to 3 hours. Try a warm shower in place of one session with the heating pad. You can also buy single-use heat wraps that last up to 8 hours. ? You can also try an ice pack for 10 to 15 minutes every 2 to 3 hours. · Take pain medicines exactly as directed. ? If the doctor gave you a prescription medicine for pain, take it as prescribed. ? If you are not taking a prescription pain medicine, ask your doctor if you can take an over-the-counter medicine.   · Gently rub the area to relieve pain and help with blood flow. Do not massage the area if it hurts to do so. · Do not do anything that makes the pain worse. Take it easy for a couple of days. You can do your usual activities if they do not hurt your neck or put it at risk for more stress or injury. · Try sleeping on a special neck pillow. Place it under your neck, not under your head. Placing a tightly rolled-up towel under your neck while you sleep will also work. If you use a neck pillow or rolled towel, do not use your regular pillow at the same time. · To prevent future neck pain, do exercises to stretch and strengthen your neck and back. Learn how to use good posture, safe lifting techniques, and proper body mechanics. When should you call for help? Call 911 anytime you think you may need emergency care. For example, call if:    · You are unable to move an arm or a leg at all.   [facility] your doctor now or seek immediate medical care if:    · You have new or worse symptoms in your arms, legs, chest, belly, or buttocks. Symptoms may include:  ? Numbness or tingling. ? Weakness. ? Pain.     · You lose bladder or bowel control.    Watch closely for changes in your health, and be sure to contact your doctor if:    · You are not getting better as expected. Where can you learn more? Go to http://patricia-nan.info/. Enter M253 in the search box to learn more about \"Neck Strain: Care Instructions. \"  Current as of: June 26, 2019  Content Version: 12.2  © 5884-2603 [employer]. Care instructions adapted under license by [employer] (which disclaims liability or warranty for this information). If you have questions about a medical condition or this instruction, always ask your healthcare professional. [place] [address] any warranty or liability for your use of this information.          Patient Education        Neck Strain or Sprain: Rehab Exercises  Introduction  Here are some examples of exercises for you to try. The exercises may be suggested for a condition or for rehabilitation. Start each exercise slowly. Ease off the exercises if you start to have pain. You will be told when to start these exercises and which ones will work best for you. How to do the exercises  Neck rotation    1. Sit in a firm chair, or stand up straight. 2. Keeping your chin level, turn your head to the right, and hold for 15 to 30 seconds. 3. Turn your head to the left and hold for 15 to 30 seconds. 4. Repeat 2 to 4 times to each side. Neck stretches    1. Look straight ahead, and tip your right ear to your right shoulder. Do not let your left shoulder rise up as you tip your head to the right. 2. Hold for 15 to 30 seconds. 3. Tilt your head to the left. Do not let your right shoulder rise up as you tip your head to the left. 4. Hold for 15 to 30 seconds. 5. Repeat 2 to 4 times to each side. Forward neck flexion    1. Sit in a firm chair, or stand up straight. 2. Bend your head forward. 3. Hold for 15 to 30 seconds. 4. Repeat 2 to 4 times. Lateral (side) bend strengthening    1. With your right hand, place your first two fingers on your right temple. 2. Start to bend your head to the side while using gentle pressure from your fingers to keep your head from bending. 3. Hold for about 6 seconds. 4. Repeat 8 to 12 times. 5. Switch hands and repeat the same exercise on your left side. Forward bend strengthening    1. Place your first two fingers of either hand on your forehead. 2. Start to bend your head forward while using gentle pressure from your fingers to keep your head from bending. 3. Hold for about 6 seconds. 4. Repeat 8 to 12 times. Neutral position strengthening    1. Using one hand, place your fingertips on the back of your head at the top of your neck.   2. Start to bend your head backward while using gentle pressure from your fingers to keep your head from bending. 3. Hold for about 6 seconds. 4. Repeat 8 to 12 times. Chin tuck    1. Lie on the floor with a rolled-up towel under your neck. Your head should be touching the floor. 2. Slowly bring your chin toward your chest.  3. Hold for a count of 6, and then relax for up to 10 seconds. 4. Repeat 8 to 12 times. Follow-up care is a key part of your treatment and safety. Be sure to make and go to all appointments, and call your doctor if you are having problems. It's also a good idea to know your test results and keep a list of the medicines you take. Where can you learn more? Go to http://patricia-nan.info/. Enter M679 in the search box to learn more about \"Neck Strain or Sprain: Rehab Exercises. \"  Current as of: June 26, 2019  Content Version: 12.2  © 1413-4825 [employer]. Care instructions adapted under license by [employer] (which disclaims liability or warranty for this information). If you have questions about a medical condition or this instruction, always ask your healthcare professional. [place] [address] any warranty or liability for your use of this information. We hope that we have addressed all of your medical concerns. The examination and treatment you received in the Emergency Department were for an emergent problem and were not intended as complete care. It is important that you follow up with your healthcare provider(s) for ongoing care. If your symptoms worsen or do not improve as expected, and you are unable to reach your usual health care provider(s), you should return to the Emergency Department. Today's healthcare is undergoing tremendous change, and patient satisfaction surveys are one of the many tools to assess the quality of medical care. You may receive a survey from the [facility] regarding your experience in the Emergency Department.   I hope that your experience has been completely positive, particularly the medical care that I provided. As such, please participate in the survey; anything less than excellent does not meet my expectations or intentions. [facility] and [facility] participate in nationally recognized quality of care measures. If your blood pressure is greater than 120/80, as reported below, we urge that you seek medical care to address the potential of high blood pressure, commonly known as hypertension. Hypertension can be hereditary or can be caused by certain medical conditions, pain, stress, or \"white coat syndrome. \"       Please make an appointment with your health care provider(s) for follow up of your Emergency Department visit. VITALS:   Patient Vitals for the past 8 hrs:   Temp Pulse Resp BP SpO2   09/29/19 0044 98.2 °F (36.8 °C) 95 22 (!) 169/92 96 %          Thank you for allowing us to provide you with medical care today. We realize that you have many choices for your emergency care needs. Please choose us in the future for any continued health care needs. Brianna Dixon, [address]: [phone]            No results found for this or any previous visit (from the past 24 hour(s)). No results found.

## 2019-09-29 NOTE — ED PROVIDER NOTES
HPI   34 yo F presents with neck pain. Pt woke up Friday morning with \"crick\" in my neck. Took ibuprofen today and flexeril without relief. Pain right lateral neck radiating to back of head, worse with movement. Denies injury or trauma. Denies fever, chills, weakness, numbness. No hx of similar symptoms.    Past Medical History:   Diagnosis Date    Anxiety     Asthma     Hypertension     Rotator cuff tear        Past Surgical History:   Procedure Laterality Date    HX CHOLECYSTECTOMY      HX CYST REMOVAL      HX ORTHOPAEDIC      ankle surgery    HX TUBAL LIGATION           Family History:   Problem Relation Age of Onset    Hypertension Mother     Hypertension Sister     Cancer Maternal Aunt        Social History     Socioeconomic History    Marital status: SINGLE     Spouse name: Not on file    Number of children: Not on file    Years of education: Not on file    Highest education level: Not on file   Occupational History    Not on file   Social Needs    Financial resource strain: Not on file    Food insecurity:     Worry: Not on file     Inability: Not on file    Transportation needs:     Medical: Not on file     Non-medical: Not on file   Tobacco Use    Smoking status: Current Some Day Smoker    Smokeless tobacco: Never Used   Substance and Sexual Activity    Alcohol use: No     Alcohol/week: 0.0 standard drinks    Drug use: No    Sexual activity: Yes     Partners: Male     Birth control/protection: Condom   Lifestyle    Physical activity:     Days per week: Not on file     Minutes per session: Not on file    Stress: Not on file   Relationships    Social connections:     Talks on phone: Not on file     Gets together: Not on file     Attends Anabaptist service: Not on file     Active member of club or organization: Not on file     Attends meetings of clubs or organizations: Not on file     Relationship status: Not on file    Intimate partner violence:     Fear of current or ex partner: Not on file     Emotionally abused: Not on file     Physically abused: Not on file     Forced sexual activity: Not on file   Other Topics Concern    Not on file   Social History Narrative    Not on file         ALLERGIES: Bactrim [sulfamethoprim ds]    Review of Systems   Constitutional: Negative for chills and fever. Respiratory: Negative for cough and shortness of breath. Cardiovascular: Negative for chest pain. Gastrointestinal: Negative for abdominal pain, diarrhea, nausea and vomiting. Musculoskeletal: Positive for neck pain. Skin: Negative for rash. Neurological: Negative for weakness, numbness and headaches. All other systems reviewed and are negative.       Vitals:    09/29/19 0044   BP: (!) 169/92   Pulse: 95   Resp: 22   Temp: 98.2 °F (36.8 °C)   SpO2: 96%   Weight: 144.7 kg (319 lb 0.1 oz)            Physical Exam   Physical Examination: General appearance - alert, well appearing, and in no distress, oriented to person, place, and time and normal appearing weight  Eyes - pupils equal and reactive, extraocular eye movements intact  Neck - supple, no significant adenopathy, tenderness to right posterior neck, no midline spinal tenderness, no swelling or erythema, worse with rom  Chest - clear to auscultation, no wheezes, rales or rhonchi, symmetric air entry  Heart - normal rate, regular rhythm, normal S1, S2, no murmurs, rubs, clicks or gallops  Abdomen - soft, nontender, nondistended, no masses or organomegaly  Back exam - full range of motion, no tenderness, palpable spasm or pain on motion  Neurological - alert, oriented, normal speech, no focal findings or movement disorder noted  Musculoskeletal - no joint tenderness, deformity or swelling  Extremities - peripheral pulses normal, no pedal edema, no clubbing or cyanosis  Skin - normal coloration and turgor, no rashes, no suspicious skin lesions noted  MDM  Number of Diagnoses or Management Options  Neck strain, initial encounter: Amount and/or Complexity of Data Reviewed  Decide to obtain previous medical records or to obtain history from someone other than the patient: yes  Review and summarize past medical records: yes    Patient Progress  Patient progress: stable         Procedures  Pt afebrile, nontoxic, pain worse with palpation and movement. Normal neuro exam. Will d/c with NSAIDS, muscle relaxers, lidocaine patch, exercises. F/u with pcp or return to ED for worsening symptoms.

## 2019-09-29 NOTE — ED TRIAGE NOTES
Patient awoke with right side neck pain yesterday. Thinks she may have slept wrong on her neck. Took Ibuprofen and a muscle relaxer with minimal relief.

## 2019-10-07 ENCOUNTER — OFFICE VISIT (OUTPATIENT)
Dept: FAMILY MEDICINE CLINIC | Age: 39
End: 2019-10-07

## 2019-10-07 VITALS
BODY MASS INDEX: 44.41 KG/M2 | OXYGEN SATURATION: 98 % | WEIGHT: 293 LBS | SYSTOLIC BLOOD PRESSURE: 138 MMHG | RESPIRATION RATE: 18 BRPM | TEMPERATURE: 99.3 F | HEIGHT: 68 IN | DIASTOLIC BLOOD PRESSURE: 84 MMHG | HEART RATE: 92 BPM

## 2019-10-07 DIAGNOSIS — J45.20 MILD INTERMITTENT ASTHMA WITHOUT COMPLICATION: ICD-10-CM

## 2019-10-07 DIAGNOSIS — D64.9 ANEMIA, UNSPECIFIED TYPE: ICD-10-CM

## 2019-10-07 DIAGNOSIS — L30.9 ECZEMA, UNSPECIFIED TYPE: ICD-10-CM

## 2019-10-07 DIAGNOSIS — F41.9 ANXIETY: ICD-10-CM

## 2019-10-07 DIAGNOSIS — I10 ESSENTIAL HYPERTENSION WITH GOAL BLOOD PRESSURE LESS THAN 140/90: Primary | ICD-10-CM

## 2019-10-07 RX ORDER — AMLODIPINE BESYLATE 5 MG/1
5 TABLET ORAL DAILY
Qty: 30 TAB | Refills: 5 | Status: SHIPPED | OUTPATIENT
Start: 2019-10-07 | End: 2020-04-01

## 2019-10-07 RX ORDER — LEVOCETIRIZINE DIHYDROCHLORIDE 5 MG/1
TABLET, FILM COATED ORAL
Qty: 30 TAB | Refills: 3 | Status: SHIPPED | OUTPATIENT
Start: 2019-10-07 | End: 2021-06-23 | Stop reason: SDUPTHER

## 2019-10-07 RX ORDER — ALBUTEROL SULFATE 90 UG/1
1 AEROSOL, METERED RESPIRATORY (INHALATION)
Qty: 1 INHALER | Refills: 2 | Status: SHIPPED | OUTPATIENT
Start: 2019-10-07 | End: 2021-01-21

## 2019-10-07 RX ORDER — CLONAZEPAM 1 MG/1
1 TABLET ORAL
Qty: 30 TAB | Refills: 2 | Status: SHIPPED | OUTPATIENT
Start: 2019-10-07 | End: 2020-01-09 | Stop reason: SDUPTHER

## 2019-10-07 RX ORDER — TRIAMCINOLONE ACETONIDE 1 MG/G
CREAM TOPICAL 2 TIMES DAILY
Qty: 60 G | Refills: 0 | Status: SHIPPED | OUTPATIENT
Start: 2019-10-07 | End: 2021-08-26

## 2019-10-07 NOTE — PROGRESS NOTES
Chief Complaint   Patient presents with    Hypertension    Medication Refill    Rash     Neck and arms bilat X 2 weeks    Anxiety     Requesting Klonopin     1. Have you been to the ER, urgent care clinic since your last visit? Hospitalized since your last visit? Yes Where: [facility] : Neck sprain    2. Have you seen or consulted any other health care providers outside of the [address] [place] [address] since your last visit? Include any pap smears or colon screening. Yes Where: [facility] : left ankle surgery      Chief Complaint   Patient presents with    Hypertension    Medication Refill    Rash     Neck and arms bilat X 2 weeks    Anxiety     Requesting Klonopin     She is a 35 y.o. female who presents for evalution. Reviewed PmHx, RxHx, FmHx, SocHx, AllgHx and updated and dated in the chart. Patient Active Problem List    Diagnosis    Obesity, morbid ([address]    Prediabetes    Essential hypertension with goal blood pressure less than 140/90    Asthma, mild intermittent    Anxiety       Review of Systems - negative except as listed above in the HPI    Objective:     Vitals:    10/07/19 1439   BP: 138/84   Pulse: 92   Resp: 18   Temp: 99.3 °F (37.4 °C)   TempSrc: Oral   SpO2: 98%   Weight: 316 lb 4.8 oz (143.5 kg)   Height: 5' 8\" (1.727 m)     Physical Examination: General appearance - alert, well appearing, and in no distress  Neck - supple, no significant adenopathy  Chest - clear to auscultation, no wheezes, rales or rhonchi, symmetric air entry  Heart - normal rate, regular rhythm, normal S1, S2, no murmurs, rubs, clicks or gallops  Abdomen - soft, nontender, nondistended, no masses or organomegaly  Extremities - peripheral pulses normal, no pedal edema, no clubbing or cyanosis      Assessment/ Plan:   Diagnoses and all orders for this visit:    1. Essential hypertension with goal blood pressure less than 140/90  -     amLODIPine (NORVASC) 5 mg tablet; Take 1 Tab by mouth daily.   - METABOLIC PANEL, COMPREHENSIVE  -     HEMOGLOBIN A1C WITH EAG  -     TSH 3RD GENERATION  -restart rx    2. Mild intermittent asthma without complication  -     albuterol (VENTOLIN HFA) 90 mcg/actuation inhaler; Take 1 Puff by inhalation every four (4) hours as needed for Wheezing. 3. Anxiety  -     clonazePAM (KLONOPIN) 1 mg tablet; Take 1 Tab by mouth daily as needed (anxiety). - must last 30 days.  -dwp to use sparingly    4. Eczema, unspecified type  -     levocetirizine (XYZAL) 5 mg tablet; TAKE 1 TAB BY MOUTH DAILY. 5. Anemia, unspecified type  -     CBC WITH AUTOMATED DIFF  -     FERRITIN  -     IRON PROFILE  -see prior labs  -periods are heavy               I have discussed the diagnosis with the patient and the intended plan as seen in the above orders. The patient understands and agrees with the plan. The patient has received an after-visit summary and questions were answered concerning future plans. Medication Side Effects and Warnings were discussed with patient  Patient Labs were reviewed and or requested:  Patient Past Records were reviewed and or requested    Randa Dobbs M.D. There are no Patient Instructions on file for this visit.

## 2019-10-08 LAB
ALBUMIN SERPL-MCNC: 4.3 G/DL (ref 3.5–5.5)
ALBUMIN/GLOB SERPL: 1.4 {RATIO} (ref 1.2–2.2)
ALP SERPL-CCNC: 74 IU/L (ref 39–117)
ALT SERPL-CCNC: 12 IU/L (ref 0–32)
AST SERPL-CCNC: 10 IU/L (ref 0–40)
BASOPHILS # BLD AUTO: 0 X10E3/UL (ref 0–0.2)
BASOPHILS NFR BLD AUTO: 0 %
BILIRUB SERPL-MCNC: 0.5 MG/DL (ref 0–1.2)
BUN SERPL-MCNC: 6 MG/DL (ref 6–24)
BUN/CREAT SERPL: 10 (ref 9–23)
CALCIUM SERPL-MCNC: 9.2 MG/DL (ref 8.7–10.2)
CHLORIDE SERPL-SCNC: 102 MMOL/L (ref 96–106)
CO2 SERPL-SCNC: 22 MMOL/L (ref 20–29)
CREAT SERPL-MCNC: 0.62 MG/DL (ref 0.57–1)
EOSINOPHIL # BLD AUTO: 0.3 X10E3/UL (ref 0–0.4)
EOSINOPHIL NFR BLD AUTO: 3 %
ERYTHROCYTE [DISTWIDTH] IN BLOOD BY AUTOMATED COUNT: 18.1 % (ref 12.3–15.4)
EST. AVERAGE GLUCOSE BLD GHB EST-MCNC: 117 MG/DL
FERRITIN SERPL-MCNC: 9 NG/ML (ref 15–150)
GLOBULIN SER CALC-MCNC: 3.1 G/DL (ref 1.5–4.5)
GLUCOSE SERPL-MCNC: 77 MG/DL (ref 65–99)
HBA1C MFR BLD: 5.7 % (ref 4.8–5.6)
HCT VFR BLD AUTO: 34.2 % (ref 34–46.6)
HGB BLD-MCNC: 10.4 G/DL (ref 11.1–15.9)
IMM GRANULOCYTES # BLD AUTO: 0 X10E3/UL (ref 0–0.1)
IMM GRANULOCYTES NFR BLD AUTO: 0 %
IRON SATN MFR SERPL: 7 % (ref 15–55)
IRON SERPL-MCNC: 31 UG/DL (ref 27–159)
LYMPHOCYTES # BLD AUTO: 2.7 X10E3/UL (ref 0.7–3.1)
LYMPHOCYTES NFR BLD AUTO: 25 %
MCH RBC QN AUTO: 22.3 PG (ref 26.6–33)
MCHC RBC AUTO-ENTMCNC: 30.4 G/DL (ref 31.5–35.7)
MCV RBC AUTO: 73 FL (ref 79–97)
MONOCYTES # BLD AUTO: 0.6 X10E3/UL (ref 0.1–0.9)
MONOCYTES NFR BLD AUTO: 5 %
NEUTROPHILS # BLD AUTO: 7.4 X10E3/UL (ref 1.4–7)
NEUTROPHILS NFR BLD AUTO: 67 %
PLATELET # BLD AUTO: 402 X10E3/UL (ref 150–450)
POTASSIUM SERPL-SCNC: 4.5 MMOL/L (ref 3.5–5.2)
PROT SERPL-MCNC: 7.4 G/DL (ref 6–8.5)
RBC # BLD AUTO: 4.67 X10E6/UL (ref 3.77–5.28)
SODIUM SERPL-SCNC: 139 MMOL/L (ref 134–144)
TIBC SERPL-MCNC: 438 UG/DL (ref 250–450)
TSH SERPL DL<=0.005 MIU/L-ACNC: 1.73 UIU/ML (ref 0.45–4.5)
UIBC SERPL-MCNC: 407 UG/DL (ref 131–425)
WBC # BLD AUTO: 11.1 X10E3/UL (ref 3.4–10.8)

## 2019-10-11 ENCOUNTER — TELEPHONE (OUTPATIENT)
Dept: FAMILY MEDICINE CLINIC | Age: 39
End: 2019-10-11

## 2019-10-11 NOTE — TELEPHONE ENCOUNTER
Received: Today   Message Contents   Leny Whitman [employer] Office Pool   Phone Number: [phone]             Caller's first and last name: Xiomara Bennett   Reason for call:  To get back lab test results and to discuss them with a nurse that called her back recently   Callback required yes/no and why: Yes, concerns about lab results   Best contact number(s): [phone]   Details to clarify the request:/

## 2019-10-11 NOTE — TELEPHONE ENCOUNTER
Received: Today   Message Contents   Luis, [address] [facility] Office Pool             Pt called to get her test results.  Best contact number is [phone]

## 2019-11-14 NOTE — PROGRESS NOTES
Chief Complaint   Patient presents with    Blood Pressure Check     Patient in office today for bp check; have no c/o.    1. Have you been to the ER, urgent care clinic since your last visit? Hospitalized since your last visit? No    2. Have you seen or consulted any other health care providers outside of the [address] [place] [address] since your last visit? Include any pap smears or colon screening.  No Detail Level: Zone

## 2019-11-20 ENCOUNTER — OFFICE VISIT (OUTPATIENT)
Dept: FAMILY MEDICINE CLINIC | Age: 39
End: 2019-11-20

## 2019-11-20 VITALS
RESPIRATION RATE: 18 BRPM | BODY MASS INDEX: 44.41 KG/M2 | HEART RATE: 88 BPM | WEIGHT: 293 LBS | HEIGHT: 68 IN | DIASTOLIC BLOOD PRESSURE: 96 MMHG | SYSTOLIC BLOOD PRESSURE: 150 MMHG | TEMPERATURE: 98.9 F | OXYGEN SATURATION: 95 %

## 2019-11-20 DIAGNOSIS — J06.9 UPPER RESPIRATORY TRACT INFECTION, UNSPECIFIED TYPE: Primary | ICD-10-CM

## 2019-11-20 RX ORDER — AZITHROMYCIN 250 MG/1
TABLET, FILM COATED ORAL
Qty: 6 TAB | Refills: 0 | Status: SHIPPED | OUTPATIENT
Start: 2019-11-20 | End: 2020-01-01

## 2019-11-20 RX ORDER — BENZONATATE 100 MG/1
100 CAPSULE ORAL
Qty: 60 CAP | Refills: 1 | Status: SHIPPED | OUTPATIENT
Start: 2019-11-20 | End: 2019-11-27

## 2019-11-20 NOTE — PROGRESS NOTES
Chief Complaint   Patient presents with    Cough     X 3 days:mucinex, alkaseltzer    Head Pain    Nasal Congestion     chest tightness    Fever     Last night 102.4     1. Have you been to the ER, urgent care clinic since your last visit? Hospitalized since your last visit? No    2. Have you seen or consulted any other health care providers outside of the [address] [place] [address] since your last visit? Include any pap smears or colon screening. No       Chief Complaint   Patient presents with    Cough     X 3 days:mucinex, alkaseltzer    Head Pain    Nasal Congestion     chest tightness    Fever     Last night 102.4     She is a 35 y.o. female who presents for evalution. Reviewed PmHx, RxHx, FmHx, SocHx, AllgHx and updated and dated in the chart. Patient Active Problem List    Diagnosis    Obesity, morbid ([address]    Prediabetes    Essential hypertension with goal blood pressure less than 140/90    Asthma, mild intermittent    Anxiety       Review of Systems - negative except as listed above in the HPI    Objective:     Vitals:    11/20/19 1506   BP: (!) 150/96   Pulse: 88   Resp: 18   Temp: 98.9 °F (37.2 °C)   TempSrc: Oral   SpO2: 95%   Weight: 320 lb (145.2 kg)   Height: 5' 8\" (1.727 m)     Physical Examination: General appearance - alert, well appearing, and in no distress  Neck - supple, no significant adenopathy  Chest - clear to auscultation, no wheezes, rales or rhonchi, symmetric air entry  Heart - normal rate, regular rhythm, normal S1, S2, no murmurs, rubs, clicks or gallops    Assessment/ Plan:   Diagnoses and all orders for this visit:    1. Upper respiratory tract infection, unspecified type  -     azithromycin (ZITHROMAX) 250 mg tablet; Take two tablets today then one tablet daily  -     benzonatate (TESSALON PERLES) 100 mg capsule; Take 1 Cap by mouth three (3) times daily as needed for Cough for up to 7 days.            I have discussed the diagnosis with the patient and the intended plan as seen in the above orders. The patient understands and agrees with the plan. The patient has received an after-visit summary and questions were answered concerning future plans. Medication Side Effects and Warnings were discussed with patient  Patient Labs were reviewed and or requested:  Patient Past Records were reviewed and or requested    Heron Heath M.D. There are no Patient Instructions on file for this visit.

## 2020-01-01 ENCOUNTER — HOSPITAL ENCOUNTER (EMERGENCY)
Age: 40
Discharge: HOME | End: 2020-01-01
Attending: EMERGENCY MEDICINE
Payer: MEDICAID

## 2020-01-01 ENCOUNTER — APPOINTMENT (OUTPATIENT)
Dept: CT IMAGING | Age: 40
End: 2020-01-01
Attending: EMERGENCY MEDICINE
Payer: MEDICAID

## 2020-01-01 VITALS
RESPIRATION RATE: 15 BRPM | HEART RATE: 78 BPM | OXYGEN SATURATION: 98 % | HEIGHT: 68 IN | SYSTOLIC BLOOD PRESSURE: 128 MMHG | BODY MASS INDEX: 44.41 KG/M2 | DIASTOLIC BLOOD PRESSURE: 69 MMHG | TEMPERATURE: 98.8 F | WEIGHT: 293 LBS

## 2020-01-01 DIAGNOSIS — J10.1 INFLUENZA B: Primary | ICD-10-CM

## 2020-01-01 DIAGNOSIS — J06.9 UPPER RESPIRATORY TRACT INFECTION, UNSPECIFIED TYPE: ICD-10-CM

## 2020-01-01 DIAGNOSIS — R10.31 RLQ ABDOMINAL PAIN: ICD-10-CM

## 2020-01-01 LAB
ANION GAP SERPL CALC-SCNC: 11 MMOL/L (ref 5–15)
APPEARANCE UR: CLEAR
BASOPHILS # BLD: 0 K/UL (ref 0–0.1)
BASOPHILS NFR BLD: 0 % (ref 0–1)
BILIRUB UR QL: NEGATIVE
BUN SERPL-MCNC: 6 MG/DL (ref 6–20)
BUN/CREAT SERPL: 8 (ref 12–20)
CALCIUM SERPL-MCNC: 8.6 MG/DL (ref 8.5–10.1)
CHLORIDE SERPL-SCNC: 101 MMOL/L (ref 97–108)
CO2 SERPL-SCNC: 26 MMOL/L (ref 21–32)
COLOR UR: NORMAL
CREAT SERPL-MCNC: 0.79 MG/DL (ref 0.55–1.02)
DIFFERENTIAL METHOD BLD: ABNORMAL
EOSINOPHIL # BLD: 0.1 K/UL (ref 0–0.4)
EOSINOPHIL NFR BLD: 1 % (ref 0–7)
ERYTHROCYTE [DISTWIDTH] IN BLOOD BY AUTOMATED COUNT: 20.1 % (ref 11.5–14.5)
FLUAV AG NPH QL IA: NEGATIVE
FLUBV AG NOSE QL IA: POSITIVE
GLUCOSE SERPL-MCNC: 97 MG/DL (ref 65–100)
GLUCOSE UR STRIP.AUTO-MCNC: NEGATIVE MG/DL
HCG UR QL: NEGATIVE
HCT VFR BLD AUTO: 35.6 % (ref 35–47)
HGB BLD-MCNC: 10.5 G/DL (ref 11.5–16)
HGB UR QL STRIP: NEGATIVE
KETONES UR QL STRIP.AUTO: NEGATIVE MG/DL
LEUKOCYTE ESTERASE UR QL STRIP.AUTO: NEGATIVE
LYMPHOCYTES # BLD: 1.6 K/UL (ref 0.8–3.5)
LYMPHOCYTES NFR BLD: 18 % (ref 12–49)
MCH RBC QN AUTO: 22 PG (ref 26–34)
MCHC RBC AUTO-ENTMCNC: 29.5 G/DL (ref 30–36.5)
MCV RBC AUTO: 74.5 FL (ref 80–99)
MONOCYTES # BLD: 1 K/UL (ref 0–1)
MONOCYTES NFR BLD: 11 % (ref 5–13)
NEUTS SEG # BLD: 6.4 K/UL (ref 1.8–8)
NEUTS SEG NFR BLD: 70 % (ref 32–75)
NITRITE UR QL STRIP.AUTO: NEGATIVE
PH UR STRIP: 6 [PH] (ref 5–8)
PLATELET # BLD AUTO: 383 K/UL (ref 150–400)
PLATELET COMMENTS,PCOM: ABNORMAL
PMV BLD AUTO: 10.5 FL (ref 8.9–12.9)
POTASSIUM SERPL-SCNC: 3.4 MMOL/L (ref 3.5–5.1)
PROT UR STRIP-MCNC: NEGATIVE MG/DL
RBC # BLD AUTO: 4.78 M/UL (ref 3.8–5.2)
RBC MORPH BLD: ABNORMAL
SODIUM SERPL-SCNC: 138 MMOL/L (ref 136–145)
SP GR UR REFRACTOMETRY: 1.02 (ref 1–1.03)
UR CULT HOLD, URHOLD: NORMAL
UROBILINOGEN UR QL STRIP.AUTO: 0.2 EU/DL (ref 0.2–1)
WBC # BLD AUTO: 9.1 K/UL (ref 3.6–11)

## 2020-01-01 PROCEDURE — 99283 EMERGENCY DEPT VISIT LOW MDM: CPT

## 2020-01-01 PROCEDURE — 96374 THER/PROPH/DIAG INJ IV PUSH: CPT

## 2020-01-01 PROCEDURE — 81003 URINALYSIS AUTO W/O SCOPE: CPT

## 2020-01-01 PROCEDURE — 74011636320 HC RX REV CODE- 636/320: Performed by: EMERGENCY MEDICINE

## 2020-01-01 PROCEDURE — 85025 COMPLETE CBC W/AUTO DIFF WBC: CPT

## 2020-01-01 PROCEDURE — 36415 COLL VENOUS BLD VENIPUNCTURE: CPT

## 2020-01-01 PROCEDURE — 74011250636 HC RX REV CODE- 250/636: Performed by: EMERGENCY MEDICINE

## 2020-01-01 PROCEDURE — 81025 URINE PREGNANCY TEST: CPT

## 2020-01-01 PROCEDURE — 80048 BASIC METABOLIC PNL TOTAL CA: CPT

## 2020-01-01 PROCEDURE — 87804 INFLUENZA ASSAY W/OPTIC: CPT

## 2020-01-01 PROCEDURE — 74177 CT ABD & PELVIS W/CONTRAST: CPT

## 2020-01-01 RX ORDER — FENTANYL CITRATE 50 UG/ML
50 INJECTION, SOLUTION INTRAMUSCULAR; INTRAVENOUS
Status: COMPLETED | OUTPATIENT
Start: 2020-01-01 | End: 2020-01-01

## 2020-01-01 RX ORDER — BENZONATATE 100 MG/1
200 CAPSULE ORAL
Qty: 30 CAP | Refills: 0 | Status: SHIPPED | OUTPATIENT
Start: 2020-01-01 | End: 2020-01-08

## 2020-01-01 RX ORDER — ONDANSETRON 2 MG/ML
4 INJECTION INTRAMUSCULAR; INTRAVENOUS
Status: COMPLETED | OUTPATIENT
Start: 2020-01-01 | End: 2020-01-01

## 2020-01-01 RX ADMIN — IOPAMIDOL 100 ML: 755 INJECTION, SOLUTION INTRAVENOUS at 11:23

## 2020-01-01 RX ADMIN — ONDANSETRON 4 MG: 2 INJECTION INTRAMUSCULAR; INTRAVENOUS at 10:08

## 2020-01-01 RX ADMIN — SODIUM CHLORIDE 1000 ML: 900 INJECTION, SOLUTION INTRAVENOUS at 10:08

## 2020-01-01 RX ADMIN — FENTANYL CITRATE 50 MCG: 50 INJECTION INTRAMUSCULAR; INTRAVENOUS at 10:10

## 2020-01-01 NOTE — ED PROVIDER NOTES
Date of Service:  1/1/2020    Patient:  Toya Franklin    Chief Complaint:  Fever and Abdominal Pain       HPI:  Toya Franklin is a 35 y.o.  female who presents for evaluation of abdominal pain and fever. Patient states that last night around 10:00 she had sudden onset right lower quadrant pain. She describes the pain as a nonradiating 7 out of 10 sharp discomfort worse with palpation and movement. No dysuria hematuria pregnancy or vaginal discharge. She is never had surgery on her stomach. She has no shortness of breath or chest pain. She does note a cough over the last day or so with a fever of 102.9 at home last night. She took Tylenol this morning. She has anorexia. No trauma.   No other acute complaints           Past Medical History:   Diagnosis Date    Anxiety     Asthma     Hypertension     Rotator cuff tear        Past Surgical History:   Procedure Laterality Date    HX CHOLECYSTECTOMY      HX CYST REMOVAL      HX ORTHOPAEDIC      ankle surgery    HX TUBAL LIGATION           Family History:   Problem Relation Age of Onset    Hypertension Mother     Hypertension Sister     Cancer Maternal Aunt        Social History     Socioeconomic History    Marital status: SINGLE     Spouse name: Not on file    Number of children: Not on file    Years of education: Not on file    Highest education level: Not on file   Occupational History    Not on file   Social Needs    Financial resource strain: Not on file    Food insecurity:     Worry: Not on file     Inability: Not on file    Transportation needs:     Medical: Not on file     Non-medical: Not on file   Tobacco Use    Smoking status: Former Smoker    Smokeless tobacco: Never Used   Substance and Sexual Activity    Alcohol use: No     Alcohol/week: 0.0 standard drinks    Drug use: No    Sexual activity: Yes     Partners: Male     Birth control/protection: Condom   Lifestyle    Physical activity:     Days per week: Not on file Minutes per session: Not on file    Stress: Not on file   Relationships    Social connections:     Talks on phone: Not on file     Gets together: Not on file     Attends Taoist service: Not on file     Active member of club or organization: Not on file     Attends meetings of clubs or organizations: Not on file     Relationship status: Not on file    Intimate partner violence:     Fear of current or ex partner: Not on file     Emotionally abused: Not on file     Physically abused: Not on file     Forced sexual activity: Not on file   Other Topics Concern    Not on file   Social History Narrative    Not on file         ALLERGIES: Bactrim [sulfamethoprim ds]    Review of Systems   Constitutional: Positive for fever. HENT: Negative for congestion, ear pain, hearing loss and sore throat. Eyes: Negative for visual disturbance. Respiratory: Positive for cough. Negative for choking, chest tightness and shortness of breath. Cardiovascular: Negative for chest pain. Gastrointestinal: Positive for abdominal pain and nausea. Negative for blood in stool, constipation, diarrhea and vomiting. Genitourinary: Negative for difficulty urinating, dysuria, flank pain, vaginal bleeding and vaginal discharge. Musculoskeletal: Negative for back pain and neck pain. Skin: Negative for rash. Neurological: Negative for dizziness and light-headedness. Psychiatric/Behavioral: Negative for confusion. Vitals:    01/01/20 0949   BP: 161/82   Pulse: 90   Resp: 14   Temp: 98.2 °F (36.8 °C)   SpO2: 94%   Weight: 145 kg (319 lb 10.7 oz)   Height: 5' 8\" (1.727 m)            Physical Exam  Vitals signs and nursing note reviewed. Constitutional:       General: She is not in acute distress. Appearance: She is well-developed. She is not ill-appearing or toxic-appearing. HENT:      Head: Normocephalic and atraumatic. Mouth/Throat:      Mouth: Mucous membranes are moist.      Pharynx: Oropharynx is clear.  No pharyngeal swelling or oropharyngeal exudate. Eyes:      General: No scleral icterus. Extraocular Movements: Extraocular movements intact. Conjunctiva/sclera: Conjunctivae normal.      Pupils: Pupils are equal, round, and reactive to light. Neck:      Musculoskeletal: Neck supple. Vascular: No JVD. Trachea: No tracheal deviation. Cardiovascular:      Rate and Rhythm: Normal rate and regular rhythm. Heart sounds: Normal heart sounds. No murmur. No gallop. Pulmonary:      Effort: Pulmonary effort is normal. No respiratory distress. Breath sounds: Normal breath sounds. Abdominal:      General: Bowel sounds are normal. There is no distension. Palpations: Abdomen is soft. Tenderness: There is tenderness in the right lower quadrant. There is no rebound. Positive signs include McBurney's sign, psoas sign and obturator sign. Negative signs include Fenton's sign. Musculoskeletal: Normal range of motion. General: No tenderness or deformity. Skin:     General: Skin is warm and dry. Capillary Refill: Capillary refill takes less than 2 seconds. Findings: No rash. Neurological:      General: No focal deficit present. Mental Status: She is alert and oriented to person, place, and time. Psychiatric:         Mood and Affect: Mood normal.         Behavior: Behavior normal.         Thought Content:  Thought content normal.         Judgment: Judgment normal.          MDM  Number of Diagnoses or Management Options  Influenza B:   RLQ abdominal pain:   Upper respiratory tract infection, unspecified type:     ED Course as of Jan 01 1139   Wed Jan 01, 2020   1026 Influenza B Antigen(!): POSITIVE [GG]      ED Course User Index  [GG] Emeterio Blunt DO     VITAL SIGNS:  Patient Vitals for the past 4 hrs:   Temp Pulse Resp BP SpO2   01/01/20 0949 98.2 °F (36.8 °C) 90 14 161/82 94 %         LABS:  Recent Results (from the past 6 hour(s))   CBC WITH AUTOMATED DIFF    Collection Time: 01/01/20 10:02 AM   Result Value Ref Range    WBC 9.1 3.6 - 11.0 K/uL    RBC 4.78 3.80 - 5.20 M/uL    HGB 10.5 (L) 11.5 - 16.0 g/dL    HCT 35.6 35.0 - 47.0 %    MCV 74.5 (L) 80.0 - 99.0 FL    MCH 22.0 (L) 26.0 - 34.0 PG    MCHC 29.5 (L) 30.0 - 36.5 g/dL    RDW 20.1 (H) 11.5 - 14.5 %    PLATELET 870 389 - 081 K/uL    MPV 10.5 8.9 - 12.9 FL    NEUTROPHILS 70 32 - 75 %    LYMPHOCYTES 18 12 - 49 %    MONOCYTES 11 5 - 13 %    EOSINOPHILS 1 0 - 7 %    BASOPHILS 0 0 - 1 %    ABS. NEUTROPHILS 6.4 1.8 - 8.0 K/UL    ABS. LYMPHOCYTES 1.6 0.8 - 3.5 K/UL    ABS. MONOCYTES 1.0 0.0 - 1.0 K/UL    ABS. EOSINOPHILS 0.1 0.0 - 0.4 K/UL    ABS.  BASOPHILS 0.0 0.0 - 0.1 K/UL    DF SMEAR SCANNED      PLATELET COMMENTS Large Platelets      RBC COMMENTS ANISOCYTOSIS  2+        RBC COMMENTS HYPOCHROMIA  3+        RBC COMMENTS POLYCHROMASIA  1+        RBC COMMENTS MICROCYTOSIS  2+        RBC COMMENTS MACROCYTOSIS  1+       METABOLIC PANEL, BASIC    Collection Time: 01/01/20 10:02 AM   Result Value Ref Range    Sodium 138 136 - 145 mmol/L    Potassium 3.4 (L) 3.5 - 5.1 mmol/L    Chloride 101 97 - 108 mmol/L    CO2 26 21 - 32 mmol/L    Anion gap 11 5 - 15 mmol/L    Glucose 97 65 - 100 mg/dL    BUN 6 6 - 20 MG/DL    Creatinine 0.79 0.55 - 1.02 MG/DL    BUN/Creatinine ratio 8 (L) 12 - 20      GFR est AA >60 >60 ml/min/1.73m2    GFR est non-AA >60 >60 ml/min/1.73m2    Calcium 8.6 8.5 - 10.1 MG/DL   URINALYSIS W/ RFLX MICROSCOPIC    Collection Time: 01/01/20 10:02 AM   Result Value Ref Range    Color YELLOW/STRAW      Appearance CLEAR CLEAR      Specific gravity 1.025 1.003 - 1.030      pH (UA) 6.0 5.0 - 8.0      Protein NEGATIVE  NEG mg/dL    Glucose NEGATIVE  NEG mg/dL    Ketone NEGATIVE  NEG mg/dL    Bilirubin NEGATIVE  NEG      Blood NEGATIVE  NEG      Urobilinogen 0.2 0.2 - 1.0 EU/dL    Nitrites NEGATIVE  NEG      Leukocyte Esterase NEGATIVE  NEG     INFLUENZA A & B AG (RAPID TEST)    Collection Time: 01/01/20 10:02 AM Result Value Ref Range    Influenza A Antigen NEGATIVE  NEG      Influenza B Antigen POSITIVE (A) NEG     URINE CULTURE HOLD SAMPLE    Collection Time: 01/01/20 10:02 AM   Result Value Ref Range    Urine culture hold        URINE ON HOLD IN MICROBIOLOGY DEPT FOR 3 DAYS. IF UNPRESERVED URINE IS SUBMITTED, IT CANNOT BE USED FOR ADDITIONAL TESTING AFTER 24 HRS, RECOLLECTION WILL BE REQUIRED. HCG URINE, QL. - POC    Collection Time: 01/01/20 10:09 AM   Result Value Ref Range    Pregnancy test,urine (POC) NEGATIVE  NEG          IMAGING:  CT ABD PELV W CONT   Final Result   IMPRESSION:   No evidence of acute abdominal or pelvic process. No significant change from the   prior study. Medications During Visit:  Medications   sodium chloride 0.9 % bolus infusion 1,000 mL (1,000 mL IntraVENous New Bag 1/1/20 1008)   ondansetron (ZOFRAN) injection 4 mg (4 mg IntraVENous Given 1/1/20 1008)   fentaNYL citrate (PF) injection 50 mcg (50 mcg IntraVENous Given 1/1/20 1010)   iopamidol (ISOVUE-370) 76 % injection 100 mL (100 mL IntraVENous Given 1/1/20 1123)         DECISION MAKING:  Toya Franklin is a 35 y.o. female who comes in as above. Here she is found to be flu be positive. This would account for her fever and upper respiratory illness symptoms. However she still has a fair amount of right lower quadrant pain with a positive McBurney's point tenderness and other signs pointing towards appendicitis. I did discuss the findings with her and possible correlation with flu however at this time she is opted to continue with CT imaging. This time CT image shows nothing acute. Patient will be discharged home with outpatient follow-up and return instructions. Symptomatic treatment with over-the-counter medications given our discussion about antivirals; she does not want to take Tamiflu. At disposition she is agreeable to this plan. All questions answered. IMPRESSION:  1. Influenza B    2.  Upper respiratory tract infection, unspecified type    3. RLQ abdominal pain        DISPOSITION:        Current Discharge Medication List      START taking these medications    Details   benzonatate (TESSALON PERLES) 100 mg capsule Take 2 Caps by mouth three (3) times daily as needed for Cough for up to 7 days. Qty: 30 Cap, Refills: 0              Follow-up Information     Follow up With Specialties Details Why Contact Info    Muriel Palafox, DO Family Practice Schedule an appointment as soon as possible for a visit   [address] [place] [address] [phone]              The patient is asked to follow-up with their primary care provider in the next several days. They are to call tomorrow for an appointment. The patient is asked to return promptly for any increased concerns or worsening of symptoms. They can return to this emergency department or any other emergency department.     Procedures

## 2020-01-01 NOTE — ED TRIAGE NOTES
Patient presents ambulatory to treatment area with a steady gait. Patient complains of severe lower abdominal pain that began last night. Patient also reports fevers as high as 102.9 last night. States she developed a headache last night, as well. Last bowel movement was yesterday; normal for her. States she has been nauseated, but has experienced no vomiting. Did not receive flu vaccination.

## 2020-01-01 NOTE — ED NOTES
The patient was discharged home by Dr Italia Molina in stable condition. The patient is alert and oriented, in no respiratory distress and discharge vital signs obtained. The patient's diagnosis, condition and treatment were explained. The patient expressed understanding. A discharge plan has been developed. A  was not involved in the process. Aftercare instructions were given. Pt ambulatory out of the ED.

## 2020-01-09 DIAGNOSIS — F41.9 ANXIETY: ICD-10-CM

## 2020-01-09 RX ORDER — CLONAZEPAM 1 MG/1
1 TABLET ORAL
Qty: 30 TAB | Refills: 2 | Status: SHIPPED | OUTPATIENT
Start: 2020-01-09 | End: 2020-04-14

## 2020-03-31 DIAGNOSIS — I10 ESSENTIAL HYPERTENSION WITH GOAL BLOOD PRESSURE LESS THAN 140/90: ICD-10-CM

## 2020-04-01 RX ORDER — AMLODIPINE BESYLATE 5 MG/1
TABLET ORAL
Qty: 30 TAB | Refills: 5 | Status: SHIPPED | OUTPATIENT
Start: 2020-04-01 | End: 2020-12-04

## 2020-04-13 DIAGNOSIS — F41.9 ANXIETY: ICD-10-CM

## 2020-04-14 RX ORDER — CLONAZEPAM 1 MG/1
TABLET ORAL
Qty: 30 TAB | Refills: 2 | Status: SHIPPED | OUTPATIENT
Start: 2020-04-14 | End: 2020-07-13

## 2020-05-01 ENCOUNTER — APPOINTMENT (OUTPATIENT)
Dept: GENERAL RADIOLOGY | Age: 40
End: 2020-05-01
Attending: EMERGENCY MEDICINE
Payer: MEDICAID

## 2020-05-01 ENCOUNTER — HOSPITAL ENCOUNTER (EMERGENCY)
Age: 40
Discharge: HOME | End: 2020-05-01
Attending: EMERGENCY MEDICINE | Admitting: EMERGENCY MEDICINE
Payer: MEDICAID

## 2020-05-01 VITALS
BODY MASS INDEX: 44.41 KG/M2 | RESPIRATION RATE: 16 BRPM | TEMPERATURE: 98.9 F | SYSTOLIC BLOOD PRESSURE: 160 MMHG | WEIGHT: 293 LBS | OXYGEN SATURATION: 96 % | DIASTOLIC BLOOD PRESSURE: 88 MMHG | HEIGHT: 68 IN | HEART RATE: 84 BPM

## 2020-05-01 DIAGNOSIS — R05.9 COUGH: Primary | ICD-10-CM

## 2020-05-01 DIAGNOSIS — Z20.822 SUSPECTED COVID-19 VIRUS INFECTION: ICD-10-CM

## 2020-05-01 PROCEDURE — 71045 X-RAY EXAM CHEST 1 VIEW: CPT

## 2020-05-01 PROCEDURE — 99282 EMERGENCY DEPT VISIT SF MDM: CPT

## 2020-05-01 PROCEDURE — 87635 SARS-COV-2 COVID-19 AMP PRB: CPT

## 2020-05-02 NOTE — ED PROVIDER NOTES
Tj Del Real is a 37 yo F with productive cough, chills, body aches, shortness of breath and burning pain in her chest. She states that she has had cough for the past week and assumed it was due to allergies but today she felt worse and the cough is now productive of greenish yellow sputum. She uses albuterol but has not had a dose since this morning. She has a daughter who had a sore throat this morning but is not aware of any other sick contacts. She has has history of asthma and hypertension.             Past Medical History:   Diagnosis Date    Anxiety     Asthma     Hypertension     Rotator cuff tear     Seasonal allergic rhinitis        Past Surgical History:   Procedure Laterality Date    HX CHOLECYSTECTOMY      HX CYST REMOVAL      HX ORTHOPAEDIC      ankle surgery    HX TUBAL LIGATION           Family History:   Problem Relation Age of Onset    Hypertension Mother     Hypertension Sister     Cancer Maternal Aunt        Social History     Socioeconomic History    Marital status: SINGLE     Spouse name: Not on file    Number of children: Not on file    Years of education: Not on file    Highest education level: Not on file   Occupational History    Not on file   Social Needs    Financial resource strain: Not on file    Food insecurity     Worry: Not on file     Inability: Not on file    Transportation needs     Medical: Not on file     Non-medical: Not on file   Tobacco Use    Smoking status: Former Smoker    Smokeless tobacco: Never Used   Substance and Sexual Activity    Alcohol use: No     Alcohol/week: 0.0 standard drinks    Drug use: No    Sexual activity: Yes     Partners: Male     Birth control/protection: Condom   Lifestyle    Physical activity     Days per week: Not on file     Minutes per session: Not on file    Stress: Not on file   Relationships    Social connections     Talks on phone: Not on file     Gets together: Not on file     Attends Hoahaoism service: Not on file     Active member of club or organization: Not on file     Attends meetings of clubs or organizations: Not on file     Relationship status: Not on file    Intimate partner violence     Fear of current or ex partner: Not on file     Emotionally abused: Not on file     Physically abused: Not on file     Forced sexual activity: Not on file   Other Topics Concern    Not on file   Social History Narrative    Not on file         ALLERGIES: Bactrim [sulfamethoprim ds]    Review of Systems   Constitutional: Positive for chills and fever (subjective). HENT: Negative for sore throat. Eyes: Negative for visual disturbance. Respiratory: Positive for cough and shortness of breath. Cardiovascular: Negative for chest pain. Gastrointestinal: Negative for abdominal pain. Genitourinary: Negative for dysuria. Musculoskeletal: Positive for myalgias. Negative for back pain. Skin: Negative for rash. Neurological: Negative for headaches. Vitals:    05/01/20 2248   BP: (!) 174/98   Pulse: 84   Resp: 16   Temp: 98.9 °F (37.2 °C)   SpO2: 98%   Weight: 145.2 kg (320 lb 1.7 oz)   Height: 5' 8\" (1.727 m)            Physical Exam  Vitals signs and nursing note reviewed. Constitutional:       General: She is not in acute distress. Appearance: She is well-developed. HENT:      Head: Normocephalic and atraumatic. Right Ear: Tympanic membrane normal.      Left Ear: Tympanic membrane normal.      Mouth/Throat:      Pharynx: No oropharyngeal exudate or posterior oropharyngeal erythema. Eyes:      Conjunctiva/sclera: Conjunctivae normal.   Neck:      Musculoskeletal: Normal range of motion. Trachea: Phonation normal.   Cardiovascular:      Rate and Rhythm: Normal rate. Pulmonary:      Effort: Pulmonary effort is normal. No respiratory distress. Breath sounds: No wheezing, rhonchi or rales. Abdominal:      General: There is no distension. Musculoskeletal: Normal range of motion. General: No tenderness. Skin:     General: Skin is warm and dry. Neurological:      Mental Status: She is alert. She is not disoriented. Motor: No abnormal muscle tone. MDM   Patient with symptoms of possible COVID infection as well as risk factors for severe illness including asthma and Hypertension. No distress at this time. Will obtain PCXR to r/o pneumonia. Swab for COVID. Addison Rose was evaluated in the Emergency Department on 5/1/2020 for the symptoms described in the history of present illness. He/she was evaluated in the context of the global COVID-19 pandemic, which necessitated consideration that the patient might be at risk for infection with the SARS-CoV-2 virus that causes COVID-19. Institutional protocols and algorithms that pertain to the evaluation of patients at risk for COVID-19 are in a state of rapid change based on information released by regulatory bodies including the CDC and federal and state organizations. These policies and algorithms were followed during the patient's care in the ED. 1. Surrogate Decision Maker (Who do you want to make decisions for you in the event you are not able to?): Her Daughter, Marilin Carter, Contact Info: [phone]  2.  Ventilation (Do you want to be intubated and mechanically ventilated?):  Yes  3. CPR (Do you want chest compressions and electricity in an attempt to restart your heart?): Yes      Procedures

## 2020-05-02 NOTE — ED TRIAGE NOTES
Pt ambulates to treatment area she states that since this morning when she woke up she has had shortness of breath with body aches. Prior to this she has had a cough with greenish sputum but she thought that was due to her allergies and asthma. She thinks that she may have had a fever this morning because her skin felt warm and she had some chills. She took her normal meds today no Tylenol for the aches or \"fever\" pt is able to speak in complete sentences no apparent respiratory distress.

## 2020-05-02 NOTE — ED NOTES
Pt given discharge instructions by Dr Patrecia Cowden she verbalizes an understanding pt stable at time of discharge

## 2020-05-04 ENCOUNTER — PATIENT OUTREACH (OUTPATIENT)
Dept: INTERNAL MEDICINE CLINIC | Age: 40
End: 2020-05-04

## 2020-05-04 LAB
SARS-COV-2, COV2NT: NOT DETECTED
SPECIMEN SOURCE, FCOV2M: NORMAL

## 2020-05-04 NOTE — PROGRESS NOTES
Patient contacted regarding recent discharge and COVID-19 risk   Ambulatory Care Manager contacted the patient by telephone to perform post discharge assessment. Verified name and  with patient as identifiers. Patient has following risk factors of: asthma and ED visit 20. ACM reviewed discharge instructions, medical action plan and red flags related to discharge diagnosis. Reviewed and educated them on any new and changed medications related to discharge diagnosis. Education provided regarding infection prevention, and signs and symptoms of COVID-19 and when to seek medical attention with patient who verbalized understanding. Discussed exposure protocols and quarantine from [address] you at higher risk for severe illness  and given an opportunity for questions and concerns. The patient agrees to contact the COVID-19 hotline [phone] or PCP office for questions related to their healthcare. AC provided contact information for future reference. From CDC: Are you at higher risk for severe illness?  Wash your hands often.  Avoid close contact (6 feet, which is about two arm lengths) with people who are sick.  Put distance between yourself and other people if COVID-19 is spreading in your community.  Clean and disinfect frequently touched surfaces.  Avoid all cruise travel and non-essential air travel.  Call your healthcare professional if you have concerns about COVID-19 and your underlying condition or if you are sick. For more information on steps you can take to protect yourself, see CDC's How to Protect Yourself      Patient/family/caregiver given information for [place] and agrees to enroll yes     Patient's preferred e-mail:  [email or web address]   Patient's preferred phone number: [phone]  Based on Loop alert triggers, patient will be contacted by nurse care manager for worsening symptoms.     Pt will be further monitored by COVID Loop Team based on severity of symptoms and risk factors.   Ratna Heath, RN  Ambulatory Care Manager

## 2020-06-15 ENCOUNTER — VIRTUAL VISIT (OUTPATIENT)
Dept: FAMILY MEDICINE CLINIC | Age: 40
End: 2020-06-15

## 2020-06-15 DIAGNOSIS — I10 ESSENTIAL HYPERTENSION WITH GOAL BLOOD PRESSURE LESS THAN 140/90: Primary | ICD-10-CM

## 2020-06-15 DIAGNOSIS — F41.9 ANXIETY: ICD-10-CM

## 2020-06-15 DIAGNOSIS — R73.03 PREDIABETES: ICD-10-CM

## 2020-06-15 DIAGNOSIS — E66.01 OBESITY, MORBID (HCC): ICD-10-CM

## 2020-06-15 RX ORDER — ESCITALOPRAM OXALATE 20 MG/1
20 TABLET ORAL DAILY
Qty: 30 TAB | Refills: 3 | Status: SHIPPED | OUTPATIENT
Start: 2020-06-15 | End: 2021-09-01

## 2020-06-15 NOTE — PROGRESS NOTES
Here for VV    Wants to increase her Klonipin from 1m to 2mg    Wants to go back on wt loss rx     bp is good at home    Anxiety is daily    Consent: Park Wheeler, who was seen by synchronous (real-time) audio-video technology, and/or her healthcare decision maker, is aware that this patient-initiated, Telehealth encounter on 6/15/2020 is a billable service, with coverage as determined by her insurance carrier. She is aware that she may receive a bill and has provided verbal consent to proceed: Yes. 712  Subjective:   Park Wheeler is a 38 y.o. female who was seen for No chief complaint on file. Prior to Admission medications    Medication Sig Start Date End Date Taking? Authorizing Provider   escitalopram oxalate (LEXAPRO) 20 mg tablet Take 1 Tab by mouth daily. 6/15/20  Yes Richardine Primrose, MD   clonazePAM (KlonoPIN) 1 mg tablet TAKE 1 TABLET BY MOUTH EVERY DAY AS NEEDED FOR ANXIETY. MUST LAST 30 DAYS 4/14/20   Richardine Primrose, MD   amLODIPine (NORVASC) 5 mg tablet TAKE 1 TABLET BY MOUTH EVERY DAY 4/1/20   Jaya Dykes NP   albuterol (VENTOLIN HFA) 90 mcg/actuation inhaler Take 1 Puff by inhalation every four (4) hours as needed for Wheezing. 10/7/19   Richardine Primrose, MD   levocetirizine (XYZAL) 5 mg tablet TAKE 1 TAB BY MOUTH DAILY. 10/7/19   Richardine Primrose, MD   triamcinolone acetonide (KENALOG) 0.1 % topical cream Apply  to affected area two (2) times a day. 10/7/19   Richardine Primrose, MD   naproxen (NAPROSYN) 500 mg tablet Take 1 Tab by mouth every twelve (12) hours as needed for Pain.  9/29/19   Kathy Rdz MD   lidocaine (LIDOCAINE PAIN RELIEF) 4 % patch Apply BID prn pain 9/29/19   Kathy Rdz MD     Allergies   Allergen Reactions    Bactrim [Sulfamethoprim Ds] Hives     Patient Active Problem List    Diagnosis    Obesity, morbid ([facility] [address]    Prediabetes    Essential hypertension with goal blood pressure less than 140/90    Asthma, mild intermittent    Anxiety     Patient Active Problem List   Diagnosis Code    Anxiety F41.9    Asthma, mild intermittent J45.20    Essential hypertension with goal blood pressure less than 140/90 I10    Prediabetes R73.03    Obesity, morbid (HCC) E66.01       ROS    Objective:   Vital Signs: (As obtained by patient/caregiver at home)  There were no vitals taken for this visit. [INSTRUCTIONS:  \"[x]\" Indicates a positive item  \"[]\" Indicates a negative item  -- DELETE ALL ITEMS NOT EXAMINED]    Constitutional: [x] Appears well-developed and well-nourished [x] No apparent distress      [] Abnormal -     Mental status: [x] Alert and awake  [x] Oriented to person/place/time [x] Able to follow commands    [] Abnormal -     Eyes:   EOM    [x]  Normal    [] Abnormal -   Sclera  [x]  Normal    [] Abnormal -          Discharge [x]  None visible   [] Abnormal -     HENT: [x] Normocephalic, atraumatic  [] Abnormal -   [x] Mouth/Throat: Mucous membranes are moist    External Ears [x] Normal  [] Abnormal -    Neck: [x] No visualized mass [] Abnormal -     Pulmonary/Chest: [x] Respiratory effort normal   [x] No visualized signs of difficulty breathing or respiratory distress        [] Abnormal -        Neurological:        [x] No Facial Asymmetry (Cranial nerve 7 motor function) (limited exam due to video visit)          [x] No gaze palsy        [] Abnormal -          Skin:        [x] No significant exanthematous lesions or discoloration noted on facial skin         [] Abnormal -            Psychiatric:       [x] Normal Affect [] Abnormal -        [x] No Hallucinations    Other pertinent observable physical exam findings:-              Assessment & Plan:   Diagnoses and all orders for this visit:    1. Essential hypertension with goal blood pressure less than 140/90  -states bp is ok at home    2. Prediabetes  -due for labs    3. Anxiety  -     escitalopram oxalate (LEXAPRO) 20 mg tablet;  Take 1 Tab by mouth daily.  -add rx  -advised pt I would not increase Elva    4. Obesity, morbid ([facility] [address]  -no rx given due to inc risk with bp                   We discussed the expected course, resolution and complications of the diagnosis(es) in detail. Medication risks, benefits, costs, interactions, and alternatives were discussed as indicated. I advised her to contact the office if her condition worsens, changes or fails to improve as anticipated. She expressed understanding with the diagnosis(es) and plan. Magali Gama is a 38 y.o. female being evaluated by a video visit encounter for concerns as above. A caregiver was present when appropriate. Due to this being a TeleHealth encounter (During [facility] public health emergency), evaluation of the following organ systems was limited: Vitals/Constitutional/EENT/Resp/CV/GI//MS/Neuro/Skin/Heme-Lymph-Imm. Pursuant to the emergency declaration under the [facility], [facility] waiver authority and the [employer] Act, this Virtual  Visit was conducted, with patient's (and/or legal guardian's) consent, to reduce the patient's risk of exposure to COVID-19 and provide necessary medical care. Services were provided through a video synchronous discussion virtually to substitute for in-person clinic visit. Patient and provider were located at their individual homes.         Melissa Basurto MD

## 2020-07-10 DIAGNOSIS — F41.9 ANXIETY: ICD-10-CM

## 2020-07-13 RX ORDER — CLONAZEPAM 1 MG/1
TABLET ORAL
Qty: 30 TAB | Refills: 2 | Status: SHIPPED | OUTPATIENT
Start: 2020-07-13 | End: 2020-10-08

## 2020-10-08 DIAGNOSIS — F41.9 ANXIETY: ICD-10-CM

## 2020-10-08 RX ORDER — CLONAZEPAM 1 MG/1
TABLET ORAL
Qty: 30 TAB | Refills: 2 | Status: SHIPPED | OUTPATIENT
Start: 2020-10-08 | End: 2021-01-04

## 2020-12-04 DIAGNOSIS — I10 ESSENTIAL HYPERTENSION WITH GOAL BLOOD PRESSURE LESS THAN 140/90: ICD-10-CM

## 2020-12-04 RX ORDER — AMLODIPINE BESYLATE 5 MG/1
TABLET ORAL
Qty: 30 TAB | Refills: 5 | Status: SHIPPED | OUTPATIENT
Start: 2020-12-04 | End: 2021-04-28

## 2021-01-02 DIAGNOSIS — F41.9 ANXIETY: ICD-10-CM

## 2021-01-04 RX ORDER — CLONAZEPAM 1 MG/1
TABLET ORAL
Qty: 30 TAB | Refills: 2 | Status: SHIPPED | OUTPATIENT
Start: 2021-01-04 | End: 2021-03-29 | Stop reason: SDUPTHER

## 2021-01-21 DIAGNOSIS — J45.20 MILD INTERMITTENT ASTHMA WITHOUT COMPLICATION: ICD-10-CM

## 2021-01-21 RX ORDER — ALBUTEROL SULFATE 90 UG/1
AEROSOL, METERED RESPIRATORY (INHALATION)
Qty: 6.7 INHALER | Refills: 0 | Status: SHIPPED | OUTPATIENT
Start: 2021-01-21 | End: 2021-02-19

## 2021-02-19 DIAGNOSIS — J45.20 MILD INTERMITTENT ASTHMA WITHOUT COMPLICATION: ICD-10-CM

## 2021-02-19 RX ORDER — ALBUTEROL SULFATE 90 UG/1
AEROSOL, METERED RESPIRATORY (INHALATION)
Qty: 6.7 INHALER | Refills: 0 | Status: SHIPPED | OUTPATIENT
Start: 2021-02-19 | End: 2021-03-30

## 2021-03-26 DIAGNOSIS — F41.9 ANXIETY: ICD-10-CM

## 2021-03-26 RX ORDER — CLONAZEPAM 1 MG/1
TABLET ORAL
Qty: 30 TAB | Refills: 2 | OUTPATIENT
Start: 2021-03-26

## 2021-03-26 NOTE — TELEPHONE ENCOUNTER
----- Message from Avtar Kwan sent at 3/26/2021 11:44 AM EDT -----  Regarding: Dr. Darylene Sneddon Telephone  Contact: [phone]  General Message/Vendor Calls    Caller's first and last name:pt      Reason for call: Rx Denied      Callback required yes/no and why:yes      Best contact number(s):[phone]      Details to clarify the request: Pt states her anxiety Rx was denied per her pharmacy and she would like to know why.      Avtar Kwan

## 2021-03-29 DIAGNOSIS — J45.20 MILD INTERMITTENT ASTHMA WITHOUT COMPLICATION: ICD-10-CM

## 2021-03-29 RX ORDER — CLONAZEPAM 1 MG/1
TABLET ORAL
Qty: 30 TAB | Refills: 2 | Status: SHIPPED | OUTPATIENT
Start: 2021-03-29 | End: 2021-06-23 | Stop reason: SDUPTHER

## 2021-03-30 RX ORDER — ALBUTEROL SULFATE 90 UG/1
AEROSOL, METERED RESPIRATORY (INHALATION)
Qty: 6.7 INHALER | Refills: 0 | Status: SHIPPED | OUTPATIENT
Start: 2021-03-30 | End: 2021-04-28

## 2021-04-27 DIAGNOSIS — I10 ESSENTIAL HYPERTENSION WITH GOAL BLOOD PRESSURE LESS THAN 140/90: ICD-10-CM

## 2021-04-27 DIAGNOSIS — J45.20 MILD INTERMITTENT ASTHMA WITHOUT COMPLICATION: ICD-10-CM

## 2021-04-28 RX ORDER — ALBUTEROL SULFATE 90 UG/1
AEROSOL, METERED RESPIRATORY (INHALATION)
Qty: 6.7 INHALER | Refills: 0 | Status: SHIPPED | OUTPATIENT
Start: 2021-04-28 | End: 2021-06-08

## 2021-04-28 RX ORDER — AMLODIPINE BESYLATE 5 MG/1
TABLET ORAL
Qty: 90 TAB | Refills: 1 | Status: SHIPPED | OUTPATIENT
Start: 2021-04-28 | End: 2021-06-23 | Stop reason: SDUPTHER

## 2021-06-08 DIAGNOSIS — J45.20 MILD INTERMITTENT ASTHMA WITHOUT COMPLICATION: ICD-10-CM

## 2021-06-08 RX ORDER — ALBUTEROL SULFATE 90 UG/1
AEROSOL, METERED RESPIRATORY (INHALATION)
Qty: 6.7 INHALER | Refills: 0 | Status: SHIPPED | OUTPATIENT
Start: 2021-06-08 | End: 2021-06-24

## 2021-06-23 DIAGNOSIS — F41.9 ANXIETY: ICD-10-CM

## 2021-06-23 DIAGNOSIS — I10 ESSENTIAL HYPERTENSION WITH GOAL BLOOD PRESSURE LESS THAN 140/90: ICD-10-CM

## 2021-06-23 DIAGNOSIS — L30.9 ECZEMA, UNSPECIFIED TYPE: ICD-10-CM

## 2021-06-24 DIAGNOSIS — J45.20 MILD INTERMITTENT ASTHMA WITHOUT COMPLICATION: ICD-10-CM

## 2021-06-24 RX ORDER — ALBUTEROL SULFATE 90 UG/1
AEROSOL, METERED RESPIRATORY (INHALATION)
Qty: 8.5 INHALER | Refills: 5 | Status: SHIPPED | OUTPATIENT
Start: 2021-06-24 | End: 2021-11-09 | Stop reason: SDUPTHER

## 2021-06-24 RX ORDER — CLONAZEPAM 1 MG/1
TABLET ORAL
Qty: 30 TABLET | Refills: 2 | Status: SHIPPED | OUTPATIENT
Start: 2021-06-24 | End: 2021-09-14

## 2021-06-24 RX ORDER — AMLODIPINE BESYLATE 5 MG/1
5 TABLET ORAL DAILY
Qty: 90 TABLET | Refills: 1 | Status: SHIPPED | OUTPATIENT
Start: 2021-06-24 | End: 2022-04-21 | Stop reason: SDUPTHER

## 2021-06-24 RX ORDER — LEVOCETIRIZINE DIHYDROCHLORIDE 5 MG/1
TABLET, FILM COATED ORAL
Qty: 30 TABLET | Refills: 3 | Status: SHIPPED | OUTPATIENT
Start: 2021-06-24 | End: 2021-09-01

## 2021-07-26 ENCOUNTER — OFFICE VISIT (OUTPATIENT)
Dept: FAMILY MEDICINE CLINIC | Age: 41
End: 2021-07-26
Payer: COMMERCIAL

## 2021-07-26 VITALS
BODY MASS INDEX: 44.41 KG/M2 | TEMPERATURE: 99.1 F | RESPIRATION RATE: 20 BRPM | WEIGHT: 293 LBS | HEART RATE: 89 BPM | SYSTOLIC BLOOD PRESSURE: 125 MMHG | HEIGHT: 68 IN | OXYGEN SATURATION: 99 % | DIASTOLIC BLOOD PRESSURE: 81 MMHG

## 2021-07-26 DIAGNOSIS — Z11.59 ENCOUNTER FOR HEPATITIS C SCREENING TEST FOR LOW RISK PATIENT: ICD-10-CM

## 2021-07-26 DIAGNOSIS — L40.9 PSORIASIS: ICD-10-CM

## 2021-07-26 DIAGNOSIS — R73.03 PREDIABETES: Primary | ICD-10-CM

## 2021-07-26 DIAGNOSIS — D50.9 IRON DEFICIENCY ANEMIA, UNSPECIFIED IRON DEFICIENCY ANEMIA TYPE: ICD-10-CM

## 2021-07-26 DIAGNOSIS — E66.01 OBESITY, MORBID (HCC): ICD-10-CM

## 2021-07-26 DIAGNOSIS — L40.50 PSORIATIC ARTHRITIS (HCC): ICD-10-CM

## 2021-07-26 DIAGNOSIS — M13.0 POLYARTHRITIS: ICD-10-CM

## 2021-07-26 DIAGNOSIS — I10 ESSENTIAL HYPERTENSION WITH GOAL BLOOD PRESSURE LESS THAN 140/90: ICD-10-CM

## 2021-07-26 PROCEDURE — 99214 OFFICE O/P EST MOD 30 MIN: CPT | Performed by: NURSE PRACTITIONER

## 2021-07-26 NOTE — PROGRESS NOTES
Chief Complaint   Patient presents with    Hypertension    Labs    Referral / Consult     Patient in office today for f/u and fasting labs. Pt would like to discuss referral for bariatric surgery. Pt has been watching diet and portion control. Unable to exercise due to back pain. Pt would like referral for rheumatology for body/joint pain. Located in all extremities,more prominent in backs,abdomen on sides,legs,and fingers. Pt is sporadic and intermittent,can last 10-15mins. Pain is noted daily though. When pain is present is a stabbing pain. Pt denies recent injuries,broke left ankle 2 yrs ago. Have been treating with otc anti-inflammatories,with no relief noted. Pt did see urgent care 3 wks ago  Was prescribed toradol injection and robaxin with no relief noted. Pt has seen ortho for back pain. MRI and Xray's was normal.  Unable to locate source of pain. 1. Have you been to the ER, urgent care clinic since your last visit? Hospitalized since your last visit? No    2. Have you seen or consulted any other health care providers outside of the [address] [place] [address] since your last visit? Include any pap smears or colon screening.  No

## 2021-07-26 NOTE — PROGRESS NOTES
Chief Complaint   Patient presents with    Hypertension    Labs    Referral / Consult     Patient in office today for f/u and fasting labs. Pt would like to discuss referral for bariatric surgery. Pt has been watching diet and portion control. Unable to exercise due to back pain. Pt was previously on medication for this and recalls that being effective. Phentermine. Pt would like referral for rheumatology for body/joint pain. Located in all extremities, more prominent in back, abdomen on sides, legs, and fingers. Pt is sporadic and intermittent, can last 10-15mins. Cant stand or walk for longer than 15 minutes due to pain. Sometimes wakes up in pain, sometimes will worsen as the day goes by. Denies any family history of AI disease or RA. Denies any joint swelling or changes. Pain is noted daily though. Just a diffuse aching sensation. When pain is present is a stabbing pain. Pt denies recent injuries, broke left ankle 2 yrs ago. Have been treating with otc anti-inflammatories, with no relief noted. Denies history of tick bite. 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 States pain has been worse for the last 1-1.5 years. Pt did see urgent care 3 wks ago  Was prescribed toradol injection and robaxin with no relief noted. Pt has seen ortho for back pain. MRI and Xray's were normal.  Unable to locate source of pain. Chief Complaint   Patient presents with    Hypertension    Labs    Referral / Consult     she is a 42y.o. year old female who presents for evalution. Reviewed PmHx, RxHx, FmHx, SocHx, AllgHx and updated and dated in the chart.     Review of Systems - negative except as listed above in the HPI    Objective:     Vitals:    07/26/21 0807   BP: 125/81   Pulse: 89   Resp: 20   Temp: 99.1 °F (37.3 °C)   TempSrc: Oral   SpO2: 99%   Weight: 341 lb (154.7 kg)   Height: 5' 8\" (1.727 m)     Physical Examination: General appearance - alert, well appearing, and in no distress; obese  Mental status - normal mood, behavior, speech, dress, motor activity, and thought processes  Eyes - pupils equal and reactive, extraocular eye movements intact  Ears - bilateral TM's and external ear canals normal  Nose - normal and patent, no erythema, discharge or polyps and normal nontender sinuses  Mouth - mucous membranes moist, pharynx normal without lesions  Neck - supple, no significant adenopathy, carotids upstroke normal bilaterally, no bruits, thyroid exam: thyroid is normal in size without nodules or tenderness  Chest - clear to auscultation, no wheezes, rales or rhonchi, symmetric air entry  Heart - normal rate, regular rhythm, normal S1, S2, no murmurs  Musculoskeletal - reports diffuse joint pain, hands, wrists, shoulders, ankles, feet, pain is worst in the low back and bilateral legs  Extremities - peripheral pulses normal, no ankle edema, no clubbing or cyanosis  Skin - has plaques of dry skin on bilateral arms that look concerning for psoriasis    Assessment/ Plan:   Diagnoses and all orders for this visit:    1. Prediabetes  -     METABOLIC PANEL, COMPREHENSIVE; Future  -     HEMOGLOBIN A1C WITH EAG; Future  Will notify results and deviate plan based on findings. 2. Obesity, morbid ([facility] [address]  -     REFERRAL TO BARIATRIC SURGERY  Recommended pt est care with bariatric surgeon for further eval and to discuss her treatment options. 3. Essential hypertension with goal blood pressure less than 140/90  -     LIPID PANEL; Future  -     TSH 3RD GENERATION; Future  BP at goal on med regimen. 4. Polyarthritis  -     RA + CCP ABS; Future  -     ANTINUCLEAR ANTIBODIES, IFA  -     COMPLEMENT, C3  -     COMPLEMENT, C4  -     SJOGREN'S ABS, SSA AND SSB  -     SED RATE (ESR); Future  -     C REACTIVE PROTEIN, QT; Future  -     REFERRAL TO RHEUMATOLOGY  Will notify results and deviate plan based on findings.    5. Iron deficiency anemia, unspecified iron deficiency anemia type  -     CBC WITH AUTOMATED DIFF; Future  -     IRON PROFILE; Future  -     FERRITIN; Future  Will notify results and deviate plan based on findings. Currently taking iron. Reports having heavy periods. Sees Zulema Johnson. 6. Encounter for hepatitis C screening test for low risk patient  -     HEPATITIS C AB; Future  Screening, asx.   7. Psoriasis  Reviewed diagnosis with pt. Consider referral to derm. Enc frequent use of emollients to keep skin hydrated. PRN steroid cream for acute patches/flare ups. 8. Psoriatic arthritis ([address]  -     REFERRAL TO RHEUMATOLOGY  Wondering if her sx are due to psoriatric arthritis. Referral to Dr. Dipak Maria for further evaluation. Continue supportive measures. Enc movement to help keep the joints loose and warmed up. Take breaks from prolonged sitting. Try to be active. I have discussed the diagnosis with the patient and the intended plan as seen in the above orders. The patient has received an after-visit summary and questions were answered concerning future plans. Medication Side Effects and Warnings were discussed with patient: yes  Patient Labs were reviewed and or requested: yes  Patient Past Records were reviewed and or requested  yes  Patient / Caregiver Understanding of treatment plan was verbalized during office visit YES    AUSTIN Ahuja    Patient Instructions          Psoriasis: Care Instructions  Your Care Instructions  Psoriasis (say \"hwx-JV-kk-vaughn\") is a long-term skin problem that causes thick, white, silvery, or red patches on the skin. The patches may be small or large, and they occur most often on the knees, elbows, scalp, hands, feet, or lower back. The skin may be scaly. If the condition is severe, your skin can become itchy and tender. Psoriasis also can be embarrassing if the patches are on visible areas. You can treat psoriasis with good care at home and with medicine from your doctor.  You may put medicine on your skin and take pills or have shots to stop the redness and swelling. Your doctor also may suggest ultraviolet light treatments. Follow-up care is a key part of your treatment and safety. Be sure to make and go to all appointments, and call your doctor if you are having problems. It's also a good idea to know your test results and keep a list of the medicines you take. How can you care for yourself at home? · If your doctor prescribes medicine, use it exactly as prescribed. Follow your doctor's advice for sunlight or ultraviolet light treatment. Call your doctor if you think you are having a problem with your medicine. · Protect your skin:  ? Keep your skin moist. After bathing, put an ointment, cream, or lotion on your skin while it is still damp. This seals in moisture. Use over-the-counter products that your doctor suggests. These may include Cetaphil, Lubriderm, or Eucerin. Petroleum jelly (such as Vaseline) and vegetable shortening (such as Crisco) also work. ? If you have psoriasis on your scalp, use a shampoo with salicylic acid, such as Neutrogena T/Sal.  ? Avoid harsh skin products, such as those that contain alcohol. ? Cover your skin in cold weather. ? Try to prevent sunburn. Although short periods of sun exposure reduce psoriasis in most people, too much sun can damage the skin and cause skin cancer. In addition, sunburns can trigger psoriasis. Use sunscreen on areas of your skin that do not have psoriasis. Make sure to use a broad-spectrum sunscreen that has a sun protection factor (SPF) of 30 or higher. Use it every day, even when it is cloudy. ? Take care to avoid accidents such as cutting or scraping your skin. An injury to the skin can cause psoriasis patches to form anywhere on the body, including the area of the injury. ? Avoid tight shoes, clothing, watchbands, and hats. These may irritate your skin. ? Use a vaporizer or humidifier to add moisture to your bedroom. Follow the directions for cleaning the machine.   · Try making one or more changes to your daily habits to help with managing your psoriasis. For example:  ? Try to control stress and anxiety. They may cause psoriasis to appear suddenly or can make symptoms worse. ? If you smoke, think about quitting. If you need help quitting, talk to your doctor about stop-smoking programs and medicines. ? If you drink, limit or reduce the amount of alcohol you drink. ? If you are overweight, see if you can lose some weight. · Seek support from family and friends. Talk to a counselor or other professional if you feel sad about your condition and need more help. When should you call for help? Call your doctor now or seek immediate medical care if:    · You have signs of infection, such as:  ? Increased pain, swelling, warmth, or redness. ? Red streaks leading from the area. ? Pus draining from the area. ? A fever. Watch closely for changes in your health, and be sure to contact your doctor if:    · You have swelling, stiffness, or pain in your joints.     · You do not get better as expected. Where can you learn more? Go to http://www.gray.com/  Enter U759 in the search box to learn more about \"Psoriasis: Care Instructions. \"  Current as of: July 2, 2020               Content Version: 12.8  © 2006-2021 [employer]. Care instructions adapted under license by [employer] (which disclaims liability or warranty for this information). If you have questions about a medical condition or this instruction, always ask your healthcare professional. [place] [address] any warranty or liability for your use of this information.

## 2021-07-26 NOTE — PATIENT INSTRUCTIONS
Psoriasis: Care Instructions  Your Care Instructions  Psoriasis (say \"rrs-GC-bn-vaughn\") is a long-term skin problem that causes thick, white, silvery, or red patches on the skin. The patches may be small or large, and they occur most often on the knees, elbows, scalp, hands, feet, or lower back. The skin may be scaly. If the condition is severe, your skin can become itchy and tender. Psoriasis also can be embarrassing if the patches are on visible areas. You can treat psoriasis with good care at home and with medicine from your doctor. You may put medicine on your skin and take pills or have shots to stop the redness and swelling. Your doctor also may suggest ultraviolet light treatments. Follow-up care is a key part of your treatment and safety. Be sure to make and go to all appointments, and call your doctor if you are having problems. It's also a good idea to know your test results and keep a list of the medicines you take. How can you care for yourself at home? · If your doctor prescribes medicine, use it exactly as prescribed. Follow your doctor's advice for sunlight or ultraviolet light treatment. Call your doctor if you think you are having a problem with your medicine. · Protect your skin:  ? Keep your skin moist. After bathing, put an ointment, cream, or lotion on your skin while it is still damp. This seals in moisture. Use over-the-counter products that your doctor suggests. These may include Cetaphil, Lubriderm, or Eucerin. Petroleum jelly (such as Vaseline) and vegetable shortening (such as Crisco) also work. ? If you have psoriasis on your scalp, use a shampoo with salicylic acid, such as Neutrogena T/Sal.  ? Avoid harsh skin products, such as those that contain alcohol. ? Cover your skin in cold weather. ? Try to prevent sunburn. Although short periods of sun exposure reduce psoriasis in most people, too much sun can damage the skin and cause skin cancer.  In addition, sunburns can trigger psoriasis. Use sunscreen on areas of your skin that do not have psoriasis. Make sure to use a broad-spectrum sunscreen that has a sun protection factor (SPF) of 30 or higher. Use it every day, even when it is cloudy. ? Take care to avoid accidents such as cutting or scraping your skin. An injury to the skin can cause psoriasis patches to form anywhere on the body, including the area of the injury. ? Avoid tight shoes, clothing, watchbands, and hats. These may irritate your skin. ? Use a vaporizer or humidifier to add moisture to your bedroom. Follow the directions for cleaning the machine. · Try making one or more changes to your daily habits to help with managing your psoriasis. For example:  ? Try to control stress and anxiety. They may cause psoriasis to appear suddenly or can make symptoms worse. ? If you smoke, think about quitting. If you need help quitting, talk to your doctor about stop-smoking programs and medicines. ? If you drink, limit or reduce the amount of alcohol you drink. ? If you are overweight, see if you can lose some weight. · Seek support from family and friends. Talk to a counselor or other professional if you feel sad about your condition and need more help. When should you call for help? Call your doctor now or seek immediate medical care if:    · You have signs of infection, such as:  ? Increased pain, swelling, warmth, or redness. ? Red streaks leading from the area. ? Pus draining from the area. ? A fever. Watch closely for changes in your health, and be sure to contact your doctor if:    · You have swelling, stiffness, or pain in your joints.     · You do not get better as expected. Where can you learn more? Go to http://www.gray.com/  Enter U759 in the search box to learn more about \"Psoriasis: Care Instructions. \"  Current as of: July 2, 2020               Content Version: 12.8  © 6772-6687 [employer].    Care instructions adapted under license by [employer] (which disclaims liability or warranty for this information). If you have questions about a medical condition or this instruction, always ask your healthcare professional. [address] any warranty or liability for your use of this information.

## 2021-07-27 ENCOUNTER — TELEPHONE (OUTPATIENT)
Dept: RHEUMATOLOGY | Age: 41
End: 2021-07-27

## 2021-07-27 NOTE — TELEPHONE ENCOUNTER
----- Message from Hunter Andres LPN sent at 5/53/6157  1:04 PM EDT -----  Regarding: FW: /Telephone    ----- Message -----  From: Karina Nielson  Sent: 7/27/2021  12:01 PM EDT  To: [facility] Nurse Pool  Subject: /Telephone                               General Message/Vendor Calls    Caller's first and last name: Yohana Murrell      Reason for call: schedule new pt appt       Callback required yes/no and why: yes      Best contact number(s): [phone]      Details to clarify the request pt has a referral and requesting a call back to schedule a new pt appt .       Aleena Ribera

## 2021-07-28 LAB
ALBUMIN SERPL-MCNC: 4 G/DL (ref 3.8–4.8)
ALBUMIN/GLOB SERPL: 1.2 {RATIO} (ref 1.2–2.2)
ALP SERPL-CCNC: 74 IU/L (ref 48–121)
ALT SERPL-CCNC: 12 IU/L (ref 0–32)
ANA TITR SER IF: NEGATIVE {TITER}
AST SERPL-CCNC: 14 IU/L (ref 0–40)
BASOPHILS # BLD AUTO: 0.1 X10E3/UL (ref 0–0.2)
BASOPHILS NFR BLD AUTO: 0 %
BILIRUB SERPL-MCNC: 0.3 MG/DL (ref 0–1.2)
BUN SERPL-MCNC: 11 MG/DL (ref 6–24)
BUN/CREAT SERPL: 18 (ref 9–23)
C3 SERPL-MCNC: 187 MG/DL (ref 82–167)
C4 SERPL-MCNC: 36 MG/DL (ref 12–38)
CALCIUM SERPL-MCNC: 9 MG/DL (ref 8.7–10.2)
CCP IGA+IGG SERPL IA-ACNC: 5 UNITS (ref 0–19)
CHLORIDE SERPL-SCNC: 103 MMOL/L (ref 96–106)
CHOLEST SERPL-MCNC: 164 MG/DL (ref 100–199)
CO2 SERPL-SCNC: 21 MMOL/L (ref 20–29)
CREAT SERPL-MCNC: 0.6 MG/DL (ref 0.57–1)
CRP SERPL-MCNC: 10 MG/L (ref 0–10)
ENA SS-A AB SER-ACNC: <0.2 AI (ref 0–0.9)
ENA SS-B AB SER-ACNC: <0.2 AI (ref 0–0.9)
EOSINOPHIL # BLD AUTO: 0.4 X10E3/UL (ref 0–0.4)
EOSINOPHIL NFR BLD AUTO: 3 %
ERYTHROCYTE [DISTWIDTH] IN BLOOD BY AUTOMATED COUNT: 19.2 % (ref 11.7–15.4)
ERYTHROCYTE [SEDIMENTATION RATE] IN BLOOD BY WESTERGREN METHOD: 29 MM/HR (ref 0–32)
EST. AVERAGE GLUCOSE BLD GHB EST-MCNC: 126 MG/DL
FERRITIN SERPL-MCNC: 7 NG/ML (ref 15–150)
GLOBULIN SER CALC-MCNC: 3.3 G/DL (ref 1.5–4.5)
GLUCOSE SERPL-MCNC: 111 MG/DL (ref 65–99)
HBA1C MFR BLD: 6 % (ref 4.8–5.6)
HCT VFR BLD AUTO: 34.3 % (ref 34–46.6)
HCV AB S/CO SERPL IA: 0.1 S/CO RATIO (ref 0–0.9)
HDLC SERPL-MCNC: 53 MG/DL
HGB BLD-MCNC: 9.6 G/DL (ref 11.1–15.9)
IMM GRANULOCYTES # BLD AUTO: 0 X10E3/UL (ref 0–0.1)
IMM GRANULOCYTES NFR BLD AUTO: 0 %
IMP & REVIEW OF LAB RESULTS: NORMAL
IRON SATN MFR SERPL: 4 % (ref 15–55)
IRON SERPL-MCNC: 17 UG/DL (ref 27–159)
LDLC SERPL CALC-MCNC: 97 MG/DL (ref 0–99)
LYMPHOCYTES # BLD AUTO: 3.3 X10E3/UL (ref 0.7–3.1)
LYMPHOCYTES NFR BLD AUTO: 24 %
MCH RBC QN AUTO: 21 PG (ref 26.6–33)
MCHC RBC AUTO-ENTMCNC: 28 G/DL (ref 31.5–35.7)
MCV RBC AUTO: 75 FL (ref 79–97)
MONOCYTES # BLD AUTO: 0.6 X10E3/UL (ref 0.1–0.9)
MONOCYTES NFR BLD AUTO: 4 %
NEUTROPHILS # BLD AUTO: 9.3 X10E3/UL (ref 1.4–7)
NEUTROPHILS NFR BLD AUTO: 69 %
PLATELET # BLD AUTO: 406 X10E3/UL (ref 150–450)
POTASSIUM SERPL-SCNC: 4.7 MMOL/L (ref 3.5–5.2)
PROT SERPL-MCNC: 7.3 G/DL (ref 6–8.5)
RBC # BLD AUTO: 4.58 X10E6/UL (ref 3.77–5.28)
RHEUMATOID FACT SERPL-ACNC: <10 IU/ML (ref 0–13.9)
SODIUM SERPL-SCNC: 139 MMOL/L (ref 134–144)
TIBC SERPL-MCNC: 450 UG/DL (ref 250–450)
TRIGL SERPL-MCNC: 75 MG/DL (ref 0–149)
TSH SERPL DL<=0.005 MIU/L-ACNC: 5.81 UIU/ML (ref 0.45–4.5)
UIBC SERPL-MCNC: 433 UG/DL (ref 131–425)
VLDLC SERPL CALC-MCNC: 14 MG/DL (ref 5–40)
WBC # BLD AUTO: 13.7 X10E3/UL (ref 3.4–10.8)

## 2021-07-29 ENCOUNTER — TELEPHONE (OUTPATIENT)
Dept: FAMILY MEDICINE CLINIC | Age: 41
End: 2021-07-29

## 2021-07-29 DIAGNOSIS — R73.03 PREDIABETES: Primary | ICD-10-CM

## 2021-07-29 DIAGNOSIS — D50.9 IRON DEFICIENCY ANEMIA, UNSPECIFIED IRON DEFICIENCY ANEMIA TYPE: Primary | ICD-10-CM

## 2021-07-29 RX ORDER — LANOLIN ALCOHOL/MO/W.PET/CERES
325 CREAM (GRAM) TOPICAL 2 TIMES DAILY
Qty: 180 TABLET | Refills: 1 | Status: SHIPPED | OUTPATIENT
Start: 2021-07-29

## 2021-07-29 RX ORDER — METFORMIN HYDROCHLORIDE 500 MG/1
500 TABLET, EXTENDED RELEASE ORAL
Qty: 90 TABLET | Refills: 1 | Status: SHIPPED | OUTPATIENT
Start: 2021-07-29 | End: 2022-03-24

## 2021-07-29 NOTE — PROGRESS NOTES
Spoke with pt in regards to lab results; pt stated they understood. Have noted cold sx-runny nose and congestion. Pt in agreement to starting metformin,send to pharmacy. 4wk follow up appt scheduled.

## 2021-07-29 NOTE — PROGRESS NOTES
Please notify pt the followin. Her hgb and iron levels are low so I recommend she start BID iron dosing, will send to pharmacy on file. WBC count is a little high. Any s/sx of infection? Recommend we recheck a CBC in 4 weeks. 2. Her labs are negative for autoimmune disease like RA and lupus. 3. Her thyroid function is a little elevated which can occur with hypothyroidism. Will also recheck in 4 weeks and if persistently abnormal consider starting pt on daily thyroid medication. 4. Her prediabetes continues to worsen. I recommend she start a daily med to decrease risk of this becoming type 2 diabetes. If amenable will send metformin to pharmacy. All other labs are normal. Start iron BID, repeat labs in 4 weeks. If persistently abnormal will refer pt to hematology.

## 2021-08-26 ENCOUNTER — OFFICE VISIT (OUTPATIENT)
Dept: FAMILY MEDICINE CLINIC | Age: 41
End: 2021-08-26
Payer: COMMERCIAL

## 2021-08-26 VITALS
WEIGHT: 293 LBS | HEART RATE: 88 BPM | SYSTOLIC BLOOD PRESSURE: 131 MMHG | HEIGHT: 68 IN | BODY MASS INDEX: 44.41 KG/M2 | RESPIRATION RATE: 18 BRPM | OXYGEN SATURATION: 98 % | TEMPERATURE: 99.5 F | DIASTOLIC BLOOD PRESSURE: 75 MMHG

## 2021-08-26 DIAGNOSIS — G47.00 INSOMNIA, UNSPECIFIED TYPE: ICD-10-CM

## 2021-08-26 DIAGNOSIS — D72.829 LEUKOCYTOSIS, UNSPECIFIED TYPE: ICD-10-CM

## 2021-08-26 DIAGNOSIS — L40.9 PSORIASIS: ICD-10-CM

## 2021-08-26 DIAGNOSIS — R94.6 ABNORMAL THYROID FUNCTION TEST: ICD-10-CM

## 2021-08-26 DIAGNOSIS — D50.9 IRON DEFICIENCY ANEMIA, UNSPECIFIED IRON DEFICIENCY ANEMIA TYPE: Primary | ICD-10-CM

## 2021-08-26 DIAGNOSIS — Z23 NEED FOR COVID-19 VACCINE: ICD-10-CM

## 2021-08-26 PROCEDURE — 99214 OFFICE O/P EST MOD 30 MIN: CPT | Performed by: NURSE PRACTITIONER

## 2021-08-26 RX ORDER — ZOLPIDEM TARTRATE 5 MG/1
5 TABLET ORAL
Qty: 30 TABLET | Refills: 0 | Status: SHIPPED | OUTPATIENT
Start: 2021-08-26 | End: 2021-11-23

## 2021-08-26 RX ORDER — BETAMETHASONE VALERATE 1.2 MG/G
CREAM TOPICAL
Qty: 15 G | Refills: 0 | Status: SHIPPED | OUTPATIENT
Start: 2021-08-26 | End: 2021-11-23

## 2021-08-26 NOTE — PATIENT INSTRUCTIONS
Insomnia: Care Instructions  Your Care Instructions     Insomnia is the inability to sleep well. It is a common problem for most people at some time. Insomnia may make it hard for you to get to sleep, stay asleep, or sleep as long as you need to. This can make you tired and grouchy during the day. It can also make you forgetful, less effective at work, and unhappy. Insomnia can be caused by conditions such as depression or anxiety. Pain can also affect your ability to sleep. When these problems are solved, the insomnia usually clears up. But sometimes bad sleep habits can cause insomnia. If insomnia is affecting your work or your enjoyment of life, you can take steps to improve your sleep. Follow-up care is a key part of your treatment and safety. Be sure to make and go to all appointments, and call your doctor if you are having problems. It's also a good idea to know your test results and keep a list of the medicines you take. How can you care for yourself at home? What to avoid   · Do not have drinks with caffeine, such as coffee or black tea, for 8 hours before bed. · Do not smoke or use other types of tobacco near bedtime. Nicotine is a stimulant and can keep you awake. · Avoid drinking alcohol late in the evening, because it can cause you to wake in the middle of the night. · Do not eat a big meal close to bedtime. If you are hungry, eat a light snack. · Do not drink a lot of water close to bedtime, because the need to urinate may wake you up during the night. · Do not read or watch TV in bed. Use the bed only for sleeping and sexual activity. What to try   · Go to bed at the same time every night, and wake up at the same time every morning. Do not take naps during the day. · Keep your bedroom quiet, dark, and cool. · Sleep on a comfortable pillow and mattress. · If watching the clock makes you anxious, turn it facing away from you so you cannot see the time.   · If you worry when you lie down, start a worry book. Well before bedtime, write down your worries, and then set the book and your concerns aside. · Try meditation or other relaxation techniques before you go to bed. · If you cannot fall asleep, get up and go to another room until you feel sleepy. Do something relaxing. Repeat your bedtime routine before you go to bed again. · Make your house quiet and calm about an hour before bedtime. Turn down the lights, turn off the TV, log off the computer, and turn down the volume on music. This can help you relax after a busy day. When should you call for help? Watch closely for changes in your health, and be sure to contact your doctor if:    · Your efforts to improve your sleep do not work.     · Your insomnia gets worse.     · You have been feeling down, depressed, or hopeless or have lost interest in things that you usually enjoy. Where can you learn more? Go to http://www.gray.com/  Enter P513 in the search box to learn more about \"Insomnia: Care Instructions. \"  Current as of: August 31, 2020               Content Version: 12.8  © 8968-8717 [employer]. Care instructions adapted under license by [employer] (which disclaims liability or warranty for this information). If you have questions about a medical condition or this instruction, always ask your healthcare professional. [address] any warranty or liability for your use of this information. Zolpidem (By mouth)   Zolpidem Tartrate (zole-PI-dem TAR-trate)  Treats insomnia. Brand Name(s): Ambien, Ambien CR   There may be other brand names for this medicine. When This Medicine Should Not Be Used: This medicine is not right for everyone. Do not use it if you had an allergic reaction to zolpidem. How to Use This Medicine:   Tablet, Long Acting Tablet  · Take your medicine as directed.  Your dose may need to be changed several times to find what works best for you.  · This medicine is not for long-term use. · This medicine is usually taken just before bedtime, or when you are having trouble falling asleep. Do not take this medicine if you are not able to sleep or rest for 7 to 8 hours before you need to be active again. · Do not take this medicine with food or right after a meal because it may not work as well. · Do not take this medicine if you have drank alcohol the same evening. · Swallow the extended-release tablet whole. Do not crush, break, or chew it. · This medicine should come with a Medication Guide. Ask your pharmacist for a copy if you do not have one. · Use this medicine only when you cannot sleep. You do not need to take it on a schedule. · Store the medicine in a closed container at room temperature, away from heat, moisture, and direct light. Drugs and Foods to Avoid:   Ask your doctor or pharmacist before using any other medicine, including over-the-counter medicines, vitamins, and herbal products. · Some medicines can affect how zolpidem works. Tell your doctor if you are using chlorpromazine, fluoxetine, imipramine, ketoconazole, rifampin, sertraline, or Denice's wort. · Tell your doctor if you use anything else that makes you sleepy. Some examples are allergy medicine, narcotic pain medicine, and alcohol. · Do not drink alcohol while you are using this medicine. Warnings While Using This Medicine:   · Tell your doctor if you are pregnant or breastfeeding, or if you have kidney disease, liver disease, lung disease or breathing problems (such as sleep apnea), or myasthenia gravis. Tell your doctor if you have a history of alcohol or drug addiction, depression, or mental health problems. · This medicine may make you dizzy or drowsy, especially first thing the next morning. Do not drive or do anything that could be dangerous until you know how this medicine affects you. · This medicine may cause unusual moods and behaviors.  You may also do things while you are still asleep that you may not remember the next morning, such as driving. Tell your doctor right away if you learn that this has happened. Also tell your doctor if you have any thought or behavior changes (such as problems with gambling or increased sex drive) that concern you. · This medicine can be habit-forming. Do not use more than your prescribed dose. Call your doctor if you think your medicine is not working. · Call your doctor if you still have trouble sleeping after you take this medicine for 7 to 10 days. · Do not stop using this medicine suddenly. Your doctor will need to slowly decrease your dose before you stop it completely. · Keep all medicine out of the reach of children. Never share your medicine with anyone. Possible Side Effects While Using This Medicine:   Call your doctor right away if you notice any of these side effects:  · Allergic reaction: Itching or hives, swelling in your face or hands, swelling or tingling in your mouth or throat, chest tightness, trouble breathing  · Anxiety, depression, nervousness, unusual behavior, or thoughts of hurting yourself  · Memory loss  · Seeing, hearing, or feeling things that are not there  · Severe confusion, drowsiness, muscle weakness  · Trouble breathing  If you notice these less serious side effects, talk with your doctor:   · Daytime drowsiness  If you notice other side effects that you think are caused by this medicine, tell your doctor. Call your doctor for medical advice about side effects. You may report side effects to FDA at 5-943-FDA-1219  © 2017 [facility] [place] Information is for End User's use only and may not be sold, redistributed or otherwise used for commercial purposes. The above information is an  only. It is not intended as medical advice for individual conditions or treatments.  Talk to your doctor, nurse or pharmacist before following any medical regimen to see if it is safe and effective for you.

## 2021-08-26 NOTE — PROGRESS NOTES
Chief Complaint   Patient presents with    Labs    Immunization/Injection    Insomnia    Dry Skin     Patient office today for multiple concerns:    1. Recheck on labs-cbc and tsh levels. Has been taking iron once daily. 2.Pt would like to discuss NP rec for covid vaccine. Questioning if safe for pt to get it. 3. Pt have c/o of insomnia that has worsened in the past few weeks. Pt have problems going to and staying asleep. Has had this problem before. Has previously been prescribed trazodone but caused nightmares. Usually every night. Then exhausted all day from poor sleep. On average 2-3 hrs of sleep per night, can wake up 1-2 x-will be up for couple hrs. Have tried and failed benadryl, Nyquil, and Advil pm.  Denies any caffeine in the afternoon or napping during the daytime. Anxiety is higher. 4. Pt have c/o of worsening of dry skin on arms. Noted no improvement with kenalog cream.   Pt uses Aquaphor otc. Using BID. Using dove soap daily. Pt has an appt with rheumatologist in October. Not interested in seeing derm at this time for her psoriasis, more interested in starting the rheum. Pt has an appt with surgical associates in September to discuss possibility of gastric sleeve. Denies any other concerns at this time. Chief Complaint   Patient presents with    Labs    Immunization/Injection    Insomnia    Dry Skin     she is a 42y.o. year old female who presents for evalution. Reviewed PmHx, RxHx, FmHx, SocHx, AllgHx and updated and dated in the chart.     Review of Systems - negative except as listed above in the HPI    Objective:     Vitals:    08/26/21 0801   BP: 131/75   Pulse: 88   Resp: 18   Temp: 99.5 °F (37.5 °C)   TempSrc: Oral   SpO2: 98%   Weight: 340 lb (154.2 kg)   Height: 5' 8\" (1.727 m)     Physical Examination: General appearance - alert, well appearing, and in no distress  Neck - thyroid exam: thyroid is normal in size without nodules or tenderness  Chest - clear to auscultation, no wheezes, rales or rhonchi, symmetric air entry  Heart - normal rate, regular rhythm, normal S1, S2, no murmurs  Skin - has plaques of dry skin on bilateral arms that appear consistent with psoriasis    Assessment/ Plan:   Diagnoses and all orders for this visit:    1. Iron deficiency anemia, unspecified iron deficiency anemia type  -     CBC WITH AUTOMATED DIFF; Future  -     IRON PROFILE; Future  -     FERRITIN; Future  Continue daily iron supplementation. 2. Leukocytosis, unspecified type  -     CBC WITH AUTOMATED DIFF; Future  Will notify results and deviate plan based on findings. 3. Abnormal thyroid function test  -     TSH 3RD GENERATION; Future  -     THYROID PEROXIDASE (TPO) AB; Future  -     T4, FREE; Future  Will notify results and deviate plan based on findings. 4. Insomnia, unspecified type  -     zolpidem (AMBIEN) 5 mg tablet; Take 1 Tablet by mouth nightly as needed for Sleep. Max Daily Amount: 5 mg. Start ambien before bedtime as needed. Reviewed SEs/ADRs of medication. Reviewed other pearls for better sleep hygiene. See pt instructions. 5. Need for COVID-19 vaccine  Recommended pt complete a 2 dose covid vaccine. 6. Psoriasis  -     betamethasone valerate (VALISONE) 0.1 % topical cream; Apply very thin layer sparingly as needed to affected areas on arms no more than once daily. Changing to a more potent cream. Apply as directed. Discussed that overuse can cause hypopigmentation of the skin. Continue frequent use of emollients. Follow-up and Dispositions    · Return if symptoms worsen or fail to improve. I have discussed the diagnosis with the patient and the intended plan as seen in the above orders. The patient has received an after-visit summary and questions were answered concerning future plans.      Medication Side Effects and Warnings were discussed with patient: yes  Patient Labs were reviewed and or requested: yes  Patient Past Records were reviewed and or requested  yes  Patient / Caregiver Understanding of treatment plan was verbalized during office visit AUSTIN Valerio    Patient Instructions          Insomnia: Care Instructions  Your Care Instructions     Insomnia is the inability to sleep well. It is a common problem for most people at some time. Insomnia may make it hard for you to get to sleep, stay asleep, or sleep as long as you need to. This can make you tired and grouchy during the day. It can also make you forgetful, less effective at work, and unhappy. Insomnia can be caused by conditions such as depression or anxiety. Pain can also affect your ability to sleep. When these problems are solved, the insomnia usually clears up. But sometimes bad sleep habits can cause insomnia. If insomnia is affecting your work or your enjoyment of life, you can take steps to improve your sleep. Follow-up care is a key part of your treatment and safety. Be sure to make and go to all appointments, and call your doctor if you are having problems. It's also a good idea to know your test results and keep a list of the medicines you take. How can you care for yourself at home? What to avoid   · Do not have drinks with caffeine, such as coffee or black tea, for 8 hours before bed. · Do not smoke or use other types of tobacco near bedtime. Nicotine is a stimulant and can keep you awake. · Avoid drinking alcohol late in the evening, because it can cause you to wake in the middle of the night. · Do not eat a big meal close to bedtime. If you are hungry, eat a light snack. · Do not drink a lot of water close to bedtime, because the need to urinate may wake you up during the night. · Do not read or watch TV in bed. Use the bed only for sleeping and sexual activity. What to try   · Go to bed at the same time every night, and wake up at the same time every morning. Do not take naps during the day.   · Keep your bedroom quiet, dark, and cool. · Sleep on a comfortable pillow and mattress. · If watching the clock makes you anxious, turn it facing away from you so you cannot see the time. · If you worry when you lie down, start a worry book. Well before bedtime, write down your worries, and then set the book and your concerns aside. · Try meditation or other relaxation techniques before you go to bed. · If you cannot fall asleep, get up and go to another room until you feel sleepy. Do something relaxing. Repeat your bedtime routine before you go to bed again. · Make your house quiet and calm about an hour before bedtime. Turn down the lights, turn off the TV, log off the computer, and turn down the volume on music. This can help you relax after a busy day. When should you call for help? Watch closely for changes in your health, and be sure to contact your doctor if:    · Your efforts to improve your sleep do not work.     · Your insomnia gets worse.     · You have been feeling down, depressed, or hopeless or have lost interest in things that you usually enjoy. Where can you learn more? Go to http://www.gray.com/  Enter P513 in the search box to learn more about \"Insomnia: Care Instructions. \"  Current as of: August 31, 2020               Content Version: 12.8  © 3734-9346 [employer]. Care instructions adapted under license by [employer] (which disclaims liability or warranty for this information). If you have questions about a medical condition or this instruction, always ask your healthcare professional. [place] [address] any warranty or liability for your use of this information. Zolpidem (By mouth)   Zolpidem Tartrate (zole-PI-dem TAR-trate)  Treats insomnia. Brand Name(s): Ambien, Ambien CR   There may be other brand names for this medicine. When This Medicine Should Not Be Used: This medicine is not right for everyone.  Do not use it if you had an allergic reaction to zolpidem. How to Use This Medicine:   Tablet, Long Acting Tablet  · Take your medicine as directed. Your dose may need to be changed several times to find what works best for you. · This medicine is not for long-term use. · This medicine is usually taken just before bedtime, or when you are having trouble falling asleep. Do not take this medicine if you are not able to sleep or rest for 7 to 8 hours before you need to be active again. · Do not take this medicine with food or right after a meal because it may not work as well. · Do not take this medicine if you have drank alcohol the same evening. · Swallow the extended-release tablet whole. Do not crush, break, or chew it. · This medicine should come with a Medication Guide. Ask your pharmacist for a copy if you do not have one. · Use this medicine only when you cannot sleep. You do not need to take it on a schedule. · Store the medicine in a closed container at room temperature, away from heat, moisture, and direct light. Drugs and Foods to Avoid:   Ask your doctor or pharmacist before using any other medicine, including over-the-counter medicines, vitamins, and herbal products. · Some medicines can affect how zolpidem works. Tell your doctor if you are using chlorpromazine, fluoxetine, imipramine, ketoconazole, rifampin, sertraline, or Denice's wort. · Tell your doctor if you use anything else that makes you sleepy. Some examples are allergy medicine, narcotic pain medicine, and alcohol. · Do not drink alcohol while you are using this medicine. Warnings While Using This Medicine:   · Tell your doctor if you are pregnant or breastfeeding, or if you have kidney disease, liver disease, lung disease or breathing problems (such as sleep apnea), or myasthenia gravis. Tell your doctor if you have a history of alcohol or drug addiction, depression, or mental health problems.   · This medicine may make you dizzy or drowsy, especially first thing the next morning. Do not drive or do anything that could be dangerous until you know how this medicine affects you. · This medicine may cause unusual moods and behaviors. You may also do things while you are still asleep that you may not remember the next morning, such as driving. Tell your doctor right away if you learn that this has happened. Also tell your doctor if you have any thought or behavior changes (such as problems with gambling or increased sex drive) that concern you. · This medicine can be habit-forming. Do not use more than your prescribed dose. Call your doctor if you think your medicine is not working. · Call your doctor if you still have trouble sleeping after you take this medicine for 7 to 10 days. · Do not stop using this medicine suddenly. Your doctor will need to slowly decrease your dose before you stop it completely. · Keep all medicine out of the reach of children. Never share your medicine with anyone. Possible Side Effects While Using This Medicine:   Call your doctor right away if you notice any of these side effects:  · Allergic reaction: Itching or hives, swelling in your face or hands, swelling or tingling in your mouth or throat, chest tightness, trouble breathing  · Anxiety, depression, nervousness, unusual behavior, or thoughts of hurting yourself  · Memory loss  · Seeing, hearing, or feeling things that are not there  · Severe confusion, drowsiness, muscle weakness  · Trouble breathing  If you notice these less serious side effects, talk with your doctor:   · Daytime drowsiness  If you notice other side effects that you think are caused by this medicine, tell your doctor. Call your doctor for medical advice about side effects. You may report side effects to FDA at 8-330-FDA-0152  © 2017 [facility] Information is for End User's use only and may not be sold, redistributed or otherwise used for commercial purposes.   The above information is an  only. It is not intended as medical advice for individual conditions or treatments. Talk to your doctor, nurse or pharmacist before following any medical regimen to see if it is safe and effective for you.

## 2021-08-26 NOTE — PROGRESS NOTES
Chief Complaint   Patient presents with    Labs    Immunization/Injection    Insomnia    Dry Skin     Patient office today for multiple concerns: 1. Recheck on labs-cbc and tsh levels. 2.Pt would like to discuss NP rec for covid vaccine. 3.Pt have c/o of insomnia that has worsened in the past few weeks. Pt have problems going and staying asleep. On average 2-3hrs of sleep per night,can wake up 1-2x-will be up for couple hrs. Have tried and failed benadryl,Nyquil,and Advil pm.    4.Pt have c/o of worsening of dry skin on arms. Noted no improvement with kenalog cream.  Pt uses Aquaphor otc. 1. Have you been to the ER, urgent care clinic since your last visit? Hospitalized since your last visit? No    2. Have you seen or consulted any other health care providers outside of the [address] [place] [address] since your last visit? Include any pap smears or colon screening.  No

## 2021-08-27 LAB
BASOPHILS # BLD AUTO: 0.1 X10E3/UL (ref 0–0.2)
BASOPHILS NFR BLD AUTO: 1 %
EOSINOPHIL # BLD AUTO: 0.4 X10E3/UL (ref 0–0.4)
EOSINOPHIL NFR BLD AUTO: 3 %
ERYTHROCYTE [DISTWIDTH] IN BLOOD BY AUTOMATED COUNT: 19.4 % (ref 11.7–15.4)
FERRITIN SERPL-MCNC: 8 NG/ML (ref 15–150)
HCT VFR BLD AUTO: 35 % (ref 34–46.6)
HGB BLD-MCNC: 9.4 G/DL (ref 11.1–15.9)
IMM GRANULOCYTES # BLD AUTO: 0.1 X10E3/UL (ref 0–0.1)
IMM GRANULOCYTES NFR BLD AUTO: 1 %
IRON SATN MFR SERPL: 4 % (ref 15–55)
IRON SERPL-MCNC: 19 UG/DL (ref 27–159)
LYMPHOCYTES # BLD AUTO: 3.7 X10E3/UL (ref 0.7–3.1)
LYMPHOCYTES NFR BLD AUTO: 27 %
MCH RBC QN AUTO: 20.3 PG (ref 26.6–33)
MCHC RBC AUTO-ENTMCNC: 26.9 G/DL (ref 31.5–35.7)
MCV RBC AUTO: 76 FL (ref 79–97)
MONOCYTES # BLD AUTO: 0.7 X10E3/UL (ref 0.1–0.9)
MONOCYTES NFR BLD AUTO: 5 %
NEUTROPHILS # BLD AUTO: 9 X10E3/UL (ref 1.4–7)
NEUTROPHILS NFR BLD AUTO: 63 %
PLATELET # BLD AUTO: 352 X10E3/UL (ref 150–450)
RBC # BLD AUTO: 4.62 X10E6/UL (ref 3.77–5.28)
T4 FREE SERPL-MCNC: 0.89 NG/DL (ref 0.82–1.77)
THYROPEROXIDASE AB SERPL-ACNC: 10 IU/ML (ref 0–34)
TIBC SERPL-MCNC: 467 UG/DL (ref 250–450)
TSH SERPL DL<=0.005 MIU/L-ACNC: 5.61 UIU/ML (ref 0.45–4.5)
UIBC SERPL-MCNC: 448 UG/DL (ref 131–425)
WBC # BLD AUTO: 13.9 X10E3/UL (ref 3.4–10.8)

## 2021-08-28 DIAGNOSIS — Z12.31 ENCOUNTER FOR SCREENING MAMMOGRAM FOR BREAST CANCER: ICD-10-CM

## 2021-08-28 DIAGNOSIS — E03.9 HYPOTHYROIDISM, UNSPECIFIED TYPE: Primary | ICD-10-CM

## 2021-08-28 DIAGNOSIS — D50.9 IRON DEFICIENCY ANEMIA, UNSPECIFIED IRON DEFICIENCY ANEMIA TYPE: Primary | ICD-10-CM

## 2021-08-28 DIAGNOSIS — E04.9 GOITER: ICD-10-CM

## 2021-08-28 RX ORDER — LEVOTHYROXINE SODIUM 125 UG/1
125 TABLET ORAL
Qty: 30 TABLET | Refills: 2 | Status: SHIPPED | OUTPATIENT
Start: 2021-08-28 | End: 2022-01-30

## 2021-08-28 NOTE — PROGRESS NOTES
Can you please call pt and make sure she has reviewed my message on mychart. Needs to start levothyroxine daily for hypothyroidism and repeat labs in 4-6 weeks. Needs to schedule and complete thyroid US. I also ordered a mammogram, needs to start annual screening and with iron def anemia need to update all HM. Needs to est care with Dr. Deanna Burden for persistent iron deficiency.

## 2021-08-30 ENCOUNTER — TELEPHONE (OUTPATIENT)
Dept: FAMILY MEDICINE CLINIC | Age: 41
End: 2021-08-30

## 2021-08-31 ENCOUNTER — TELEPHONE (OUTPATIENT)
Dept: FAMILY MEDICINE CLINIC | Age: 41
End: 2021-08-31

## 2021-09-01 ENCOUNTER — APPOINTMENT (OUTPATIENT)
Dept: GENERAL RADIOLOGY | Age: 41
End: 2021-09-01
Attending: EMERGENCY MEDICINE
Payer: COMMERCIAL

## 2021-09-01 ENCOUNTER — HOSPITAL ENCOUNTER (EMERGENCY)
Age: 41
Discharge: HOME | End: 2021-09-01
Attending: EMERGENCY MEDICINE
Payer: COMMERCIAL

## 2021-09-01 VITALS
DIASTOLIC BLOOD PRESSURE: 81 MMHG | TEMPERATURE: 99 F | HEART RATE: 81 BPM | SYSTOLIC BLOOD PRESSURE: 139 MMHG | HEIGHT: 68 IN | OXYGEN SATURATION: 98 % | BODY MASS INDEX: 44.41 KG/M2 | WEIGHT: 293 LBS | RESPIRATION RATE: 20 BRPM

## 2021-09-01 DIAGNOSIS — R09.1 PLEURISY: ICD-10-CM

## 2021-09-01 DIAGNOSIS — R07.89 ATYPICAL CHEST PAIN: Primary | ICD-10-CM

## 2021-09-01 LAB
ALBUMIN SERPL-MCNC: 3.1 G/DL (ref 3.5–5)
ALBUMIN/GLOB SERPL: 0.7 {RATIO} (ref 1.1–2.2)
ALP SERPL-CCNC: 75 U/L (ref 45–117)
ALT SERPL-CCNC: 18 U/L (ref 12–78)
ANION GAP SERPL CALC-SCNC: 11 MMOL/L (ref 5–15)
AST SERPL-CCNC: 9 U/L (ref 15–37)
BASOPHILS # BLD: 0 K/UL (ref 0–0.1)
BASOPHILS NFR BLD: 0 % (ref 0–1)
BILIRUB SERPL-MCNC: 0.2 MG/DL (ref 0.2–1)
BNP SERPL-MCNC: 75 PG/ML (ref 0–125)
BUN SERPL-MCNC: 9 MG/DL (ref 6–20)
BUN/CREAT SERPL: 12 (ref 12–20)
CALCIUM SERPL-MCNC: 8.3 MG/DL (ref 8.5–10.1)
CHLORIDE SERPL-SCNC: 104 MMOL/L (ref 97–108)
CK MB CFR SERPL CALC: NORMAL % (ref 0–2.5)
CK MB SERPL-MCNC: <1 NG/ML (ref 5–25)
CK SERPL-CCNC: 217 U/L (ref 26–192)
CO2 SERPL-SCNC: 27 MMOL/L (ref 21–32)
CREAT SERPL-MCNC: 0.77 MG/DL (ref 0.55–1.02)
D DIMER PPP FEU-MCNC: 0.48 MG/L FEU (ref 0–0.65)
DIFFERENTIAL METHOD BLD: ABNORMAL
EOSINOPHIL # BLD: 0.3 K/UL (ref 0–0.4)
EOSINOPHIL NFR BLD: 2 % (ref 0–7)
ERYTHROCYTE [DISTWIDTH] IN BLOOD BY AUTOMATED COUNT: 21.6 % (ref 11.5–14.5)
GLOBULIN SER CALC-MCNC: 4.3 G/DL (ref 2–4)
GLUCOSE SERPL-MCNC: 105 MG/DL (ref 65–100)
HCT VFR BLD AUTO: 32.6 % (ref 35–47)
HGB BLD-MCNC: 9.3 G/DL (ref 11.5–16)
IMM GRANULOCYTES # BLD AUTO: 0 K/UL (ref 0–0.04)
IMM GRANULOCYTES NFR BLD AUTO: 0 % (ref 0–0.5)
LACTATE SERPL-SCNC: 1.3 MMOL/L (ref 0.4–2)
LYMPHOCYTES # BLD: 3.4 K/UL (ref 0.8–3.5)
LYMPHOCYTES NFR BLD: 24 % (ref 12–49)
MCH RBC QN AUTO: 20.7 PG (ref 26–34)
MCHC RBC AUTO-ENTMCNC: 28.5 G/DL (ref 30–36.5)
MCV RBC AUTO: 72.6 FL (ref 80–99)
MONOCYTES # BLD: 0.8 K/UL (ref 0–1)
MONOCYTES NFR BLD: 6 % (ref 5–13)
NEUTS SEG # BLD: 9.6 K/UL (ref 1.8–8)
NEUTS SEG NFR BLD: 68 % (ref 32–75)
NRBC # BLD: 0 K/UL (ref 0–0.01)
NRBC BLD-RTO: 0 PER 100 WBC
PLATELET # BLD AUTO: 405 K/UL (ref 150–400)
PMV BLD AUTO: 10.3 FL (ref 8.9–12.9)
POTASSIUM SERPL-SCNC: 3.5 MMOL/L (ref 3.5–5.1)
PROT SERPL-MCNC: 7.4 G/DL (ref 6.4–8.2)
RBC # BLD AUTO: 4.49 M/UL (ref 3.8–5.2)
RBC MORPH BLD: ABNORMAL
SODIUM SERPL-SCNC: 142 MMOL/L (ref 136–145)
TROPONIN I SERPL-MCNC: <0.05 NG/ML
WBC # BLD AUTO: 14.1 K/UL (ref 3.6–11)
WBC MORPH BLD: ABNORMAL

## 2021-09-01 PROCEDURE — 36415 COLL VENOUS BLD VENIPUNCTURE: CPT

## 2021-09-01 PROCEDURE — 85379 FIBRIN DEGRADATION QUANT: CPT

## 2021-09-01 PROCEDURE — 83880 ASSAY OF NATRIURETIC PEPTIDE: CPT

## 2021-09-01 PROCEDURE — 96375 TX/PRO/DX INJ NEW DRUG ADDON: CPT

## 2021-09-01 PROCEDURE — 71045 X-RAY EXAM CHEST 1 VIEW: CPT

## 2021-09-01 PROCEDURE — 84484 ASSAY OF TROPONIN QUANT: CPT

## 2021-09-01 PROCEDURE — 99284 EMERGENCY DEPT VISIT MOD MDM: CPT

## 2021-09-01 PROCEDURE — 82553 CREATINE MB FRACTION: CPT

## 2021-09-01 PROCEDURE — 80053 COMPREHEN METABOLIC PANEL: CPT

## 2021-09-01 PROCEDURE — 93005 ELECTROCARDIOGRAM TRACING: CPT

## 2021-09-01 PROCEDURE — 74011250636 HC RX REV CODE- 250/636: Performed by: EMERGENCY MEDICINE

## 2021-09-01 PROCEDURE — 85025 COMPLETE CBC W/AUTO DIFF WBC: CPT

## 2021-09-01 PROCEDURE — 83605 ASSAY OF LACTIC ACID: CPT

## 2021-09-01 PROCEDURE — 96374 THER/PROPH/DIAG INJ IV PUSH: CPT

## 2021-09-01 PROCEDURE — 82550 ASSAY OF CK (CPK): CPT

## 2021-09-01 RX ORDER — KETOROLAC TROMETHAMINE 30 MG/ML
30 INJECTION, SOLUTION INTRAMUSCULAR; INTRAVENOUS
Status: COMPLETED | OUTPATIENT
Start: 2021-09-01 | End: 2021-09-01

## 2021-09-01 RX ORDER — ONDANSETRON 2 MG/ML
8 INJECTION INTRAMUSCULAR; INTRAVENOUS
Status: COMPLETED | OUTPATIENT
Start: 2021-09-01 | End: 2021-09-01

## 2021-09-01 RX ADMIN — ONDANSETRON 8 MG: 2 INJECTION INTRAMUSCULAR; INTRAVENOUS at 03:57

## 2021-09-01 RX ADMIN — KETOROLAC TROMETHAMINE 30 MG: 30 INJECTION, SOLUTION INTRAMUSCULAR at 03:57

## 2021-09-01 NOTE — ED NOTES
Reviewed discharge instructions with patient, verbalized understanding and no further questions at this time  Declined wheelchair, ambulated out of department without difficulties

## 2021-09-01 NOTE — Clinical Note
[address] EMERGENCY DEPT  [address] [facility] [id number] [phone]    Work/School Note    Date: 9/1/2021    To Whom It May concern:    Baljit Linton was seen and treated today in the emergency room by the following provider(s):  Attending Provider: Celina Booekr MD.      Baljit Linton is excused from work/school on 09/01/21 and 09/02/21. She is medically clear to return to work/school on 9/3/2021.        Sincerely,          Sharmin Kingsley MD

## 2021-09-01 NOTE — ED PROVIDER NOTES
44-year-old female with a past medical history significant for anxiety, asthma, hypertension, seasonal allergies, morbid obesity who presents to the ED with a complaint of sudden onset of midsternal sharp and stabbing discomfort, severity 5 out of 10, constant, worse with deep breaths, accompanied by nausea and diaphoresis. The patient states she was asleep when the symptoms began. She denies any fever chills, headache, neck and back pain, nausea, vomiting, diarrhea, constipation, dysuria, hematuria, dizziness, extremity weakness or numbness, sick contact, skin rash, recent travel, prior history of the same.            Past Medical History:   Diagnosis Date    Anxiety     Asthma     Hypertension     Rotator cuff tear     Seasonal allergic rhinitis        Past Surgical History:   Procedure Laterality Date    HX CHOLECYSTECTOMY      HX CYST REMOVAL      HX ORTHOPAEDIC      ankle surgery    HX TUBAL LIGATION           Family History:   Problem Relation Age of Onset    Hypertension Mother     Hypertension Sister     Cancer Maternal Aunt        Social History     Socioeconomic History    Marital status: SINGLE     Spouse name: Not on file    Number of children: Not on file    Years of education: Not on file    Highest education level: Not on file   Occupational History    Not on file   Tobacco Use    Smoking status: Former Smoker    Smokeless tobacco: Never Used   Substance and Sexual Activity    Alcohol use: No     Alcohol/week: 0.0 standard drinks    Drug use: No    Sexual activity: Yes     Partners: Male     Birth control/protection: Condom   Other Topics Concern    Not on file   Social History Narrative    Not on file     Social Determinants of Health     Financial Resource Strain:     Difficulty of Paying Living Expenses:    Food Insecurity:     Worried About Running Out of Food in the Last Year:     [address] in the Last Year:    Transportation Needs:     Lack of Transportation (Medical):  Lack of Transportation (Non-Medical):    Physical Activity:     Days of Exercise per Week:     Minutes of Exercise per Session:    Stress:     Feeling of Stress :    Social Connections:     Frequency of Communication with Friends and Family:     Frequency of Social Gatherings with Friends and Family:     Attends [employer] Services:     Active Member of Clubs or Organizations:     Attends Club or Organization Meetings:     Marital Status:    Intimate Partner Violence:     Fear of Current or Ex-Partner:     Emotionally Abused:     Physically Abused:     Sexually Abused: ALLERGIES: Bactrim [sulfamethoprim ds]    Review of Systems   All other systems reviewed and are negative. There were no vitals filed for this visit. Physical Exam  Vitals and nursing note reviewed. CONSTITUTIONAL: Well-appearing; well-nourished; in no apparent distress  HEAD: Normocephalic; atraumatic  EYES: PERRL; EOM intact; conjunctiva and sclera are clear bilaterally. ENT: No rhinorrhea; normal pharynx with no tonsillar hypertrophy; mucous membranes pink/moist, no erythema, no exudate. NECK: Supple; non-tender; no cervical lymphadenopathy  CARD: Normal S1, S2; no murmurs, rubs, or gallops. Regular rate and rhythm. RESP: Normal respiratory effort; breath sounds clear and equal bilaterally; no wheezes, rhonchi, or rales. ABD: Normal bowel sounds; non-distended; non-tender; no palpable organomegaly, no masses, no bruits. Back Exam: Normal inspection; no vertebral point tenderness, no CVA tenderness. Normal range of motion. EXT: Normal ROM in all four extremities; non-tender to palpation; no swelling or deformity; distal pulses are normal, no edema. SKIN: Warm; dry; no rash.   NEURO:Alert and oriented x 3, coherent, CHRISTINE-XII grossly intact, sensory and motor are non-focal.      MDM  Number of Diagnoses or Management Options  Diagnosis management comments: Assessment: 25-year-old female who presents to the ER for evaluation for pleuritic sharp and stabbing chest discomfort. Differential diagnosis include pleurisy, ACS, VTE, electrolyte abnormality and dehydration. Plan: Lab/EKG/chest x-ray/Toradol and Zofran/education, reassurance, symptomatic treatment/ Monitor and Reevaluate. Amount and/or Complexity of Data Reviewed  Clinical lab tests: ordered and reviewed  Tests in the radiology section of CPT®: ordered and reviewed  Tests in the medicine section of CPT®: ordered and reviewed  Discussion of test results with the performing providers: yes  Decide to obtain previous medical records or to obtain history from someone other than the patient: yes  Obtain history from someone other than the patient: yes  Review and summarize past medical records: yes  Discuss the patient with other providers: yes  Independent visualization of images, tracings, or specimens: yes    Risk of Complications, Morbidity, and/or Mortality  Presenting problems: moderate  Diagnostic procedures: moderate  Management options: moderate    Patient Progress  Patient progress: stable         Procedures    ED EKG interpretation:  Rhythm: sinus tachycardia; and irregular with PACs. Rate (approx.): 100; Axis: left axis deviation; P wave: normal; QRS interval: normal ; ST/T wave: non-specific changes; in  Lead: Diffusely; Other findings: abnormal ekg. This EKG was interpreted by Abdirahman De La Cruz MD,ED Provider. XRAY INTERPRETATION (ED MD)  Chest Xray  No acute process seen. Normal heart size. No bony abnormalities. No infiltrate. Matthieu Do MD 3:05 AM    Progress Note:   Pt has been reexamined by Abdirahman De La Cruz MD. Pt is feeling much better. Symptoms have improved. All available results have been reviewed with pt and any available family. Pt understands sx, dx, and tx in ED. Care plan has been outlined and questions have been answered. Pt is ready to go home. Will send home on chest pain and pleurisy instruction. . Outpatient referral with PCP as needed. Written by Robert Durham MD,5:11 AM    .   .

## 2021-09-02 NOTE — PROGRESS NOTES
Spoke with pt in regards to lab results; pt stated they understood. Pt states she started levothyroxine and noted insomnia and palpitations-went to ED due to sx,exam was normal.  Advised common SE of abnormal tsh levels is insomnia,weight gain/weight loss,hair loss,and palpitations-please continue with rx and will notify provider of sx;stated she understood.

## 2021-09-14 DIAGNOSIS — F41.9 ANXIETY: ICD-10-CM

## 2021-09-15 DIAGNOSIS — F41.9 ANXIETY: ICD-10-CM

## 2021-09-15 RX ORDER — CLONAZEPAM 1 MG/1
TABLET ORAL
Qty: 30 TABLET | Refills: 0 | OUTPATIENT
Start: 2021-09-15

## 2021-09-21 ENCOUNTER — TELEPHONE (OUTPATIENT)
Dept: ONCOLOGY | Age: 41
End: 2021-09-21

## 2021-10-25 ENCOUNTER — OFFICE VISIT (OUTPATIENT)
Dept: RHEUMATOLOGY | Age: 41
End: 2021-10-25
Payer: COMMERCIAL

## 2021-10-25 VITALS
WEIGHT: 293 LBS | HEART RATE: 92 BPM | TEMPERATURE: 98.5 F | DIASTOLIC BLOOD PRESSURE: 81 MMHG | SYSTOLIC BLOOD PRESSURE: 140 MMHG | RESPIRATION RATE: 20 BRPM | HEIGHT: 67 IN | BODY MASS INDEX: 45.99 KG/M2

## 2021-10-25 DIAGNOSIS — M70.62 TROCHANTERIC BURSITIS OF BOTH HIPS: ICD-10-CM

## 2021-10-25 DIAGNOSIS — M15.9 PRIMARY OSTEOARTHRITIS INVOLVING MULTIPLE JOINTS: ICD-10-CM

## 2021-10-25 DIAGNOSIS — M47.816 LUMBAR FACET ARTHROPATHY: Primary | ICD-10-CM

## 2021-10-25 DIAGNOSIS — E66.01 MORBID OBESITY (HCC): ICD-10-CM

## 2021-10-25 DIAGNOSIS — M70.61 TROCHANTERIC BURSITIS OF BOTH HIPS: ICD-10-CM

## 2021-10-25 PROCEDURE — 99205 OFFICE O/P NEW HI 60 MIN: CPT | Performed by: INTERNAL MEDICINE

## 2021-10-25 RX ORDER — MELATONIN
DAILY
COMMUNITY
End: 2022-03-24

## 2021-10-25 NOTE — LETTER
11/3/2021    Patient: Bhavna Chapman   YOB: 1980   Date of Visit: 10/25/2021     Maria M Sparks DO  [address]   [address] [facility] [address]  [address], NP  [address] [place] [address]    Dear DO Alex Tran NP,      Thank you for referring Ms. Nicola Maria to [address] [place] [address] for evaluation. My notes for this consultation are attached. If you have questions, please do not hesitate to call me. I look forward to following your patient along with you.       Sincerely,    Alex Corona MD

## 2021-10-25 NOTE — PATIENT INSTRUCTIONS
1. I think most of your back and lower body pain is related to muscular strain and mechanical stresses. Your low back pain fits a pattern of lumbar facet arthropathy. Sometimes, hidradenitis can cause joint inflammation, but at the moment your skin is well-controlled, and I don't think this explains your current pains. 2. Physical therapy for your low back, hips, and knees. I'm asking them to help you plan water therapy, and to trial a course of TENS for the low back. 3. Keep working with your PCP and Hematology to improve your iron levels, I think this will help the fatigue. For now, would favor tylenol 1000mg up to 2-3x/day over NSAIDs, since NSAIDs like Aleve can irritate the stomach lining and worsen iron deficiency. 4. If you feel motivated, I'd agree that bariatric surgery should improve some of your joint pain. There is a fourfold reduction in knee forces for every unit of weight lost (Arthritis Rheum. 2005 Jul;52(7):2026-32)     5. Return if you develop new rashes, joint swelling, or inflammatory eye disease (scleritis, uveitis, or persistent eye redness/inflammation). You're a return patient for up to 3 years.

## 2021-10-25 NOTE — PROGRESS NOTES
REASON FOR VISIT:    is a 42 y.o. female with history of asthma, hypothyroidism, and morbid obesity who is being referred to [facility] Rheumatology at the request of Dr. Heather Saleh, regarding a diagnosis of joint pain. HISTORY OF PRESENT ILLNESS    For about the last year, has had bilateral hip, low back, legs, and sometimes shoulder pains. Developed gradually, and gradually getting worse. Xray of the back and f/u MRI didn't show any advanced arthritis. Now has to ride a scooter when at [phone]. For activity doesn't tolerate anything more than walking up or down her stairs. Hard now to stand for more than 5 minutes. No obvious joint swelling other than an old left ankle break that swells. Has always had rashes in the Spring she attributes to pollen allergies. For the last 2 years has had more year-round bilateral extensor-predominant forearm and dorsal hand roughness and pruritis. Saw Dermatology for boils on the legs or buttocks primarily, believes she completed doxycycline for hidradenitis and hasn't needed treatment since 2019. Denies personal or family history of psoriasis or scaly rashes. For pain now, takes 2 Aleve twice a day or ibuprofen which she feels provides minimal improvement. Thyroid replacement has helped with her energy; she also takes iron twice a day and is scheduled to see Hematology for consideration of infusion. Attributes to menorrhagia. Works at [employer] for a [employer] center. Has a history of asthma, uses a rescue inhaler a couple days/week. Chronic dry cough. REVIEW OF SYSTEMS  A comprehensive review of systems was negative except for that written in the HPI. A 10-point review of systems is per the new patient questionnaire, which has been reviewed extensively and scanned into the electronic medical record for future reference. Review of systems is as above and is otherwise negative.     ALLERGIES  Bactrim [sulfamethoprim ds]    MEDICATIONS  Current Outpatient Medications   Medication Sig    clonazePAM (KlonoPIN) 1 mg tablet TAKE 1 TABLET BY MOUTH EVERY DAY AS NEEDED FOR ANXIETY    levothyroxine (SYNTHROID) 125 mcg tablet Take 1 Tablet by mouth Daily (before breakfast).  zolpidem (AMBIEN) 5 mg tablet Take 1 Tablet by mouth nightly as needed for Sleep. Max Daily Amount: 5 mg.  betamethasone valerate (VALISONE) 0.1 % topical cream Apply very thin layer sparingly as needed to affected areas on arms no more than once daily.  ferrous sulfate 325 mg (65 mg iron) tablet Take 1 Tablet by mouth two (2) times a day.  metFORMIN ER (GLUCOPHAGE XR) 500 mg tablet Take 1 Tablet by mouth daily (with dinner).  amLODIPine (NORVASC) 5 mg tablet Take 1 Tablet by mouth daily.  albuterol (PROVENTIL HFA, VENTOLIN HFA, PROAIR HFA) 90 mcg/actuation inhaler INHALE 2 PUFFS BY MOUTH EVERY 4 TO 6 HOURS AS NEEDED FOR BREATHING     No current facility-administered medications for this visit. PAST MEDICAL HISTORY  Past Medical History:   Diagnosis Date    Anxiety     Asthma     Hypertension     Rotator cuff tear     Seasonal allergic rhinitis        FAMILY HISTORY  family history includes Cancer in her maternal aunt; Hypertension in her mother and sister. No family history of lupus, RA, sarcoidosis. Two first cousins have MS. SOCIAL HISTORY  She  reports that she has quit smoking. She has never used smokeless tobacco. She reports that she does not drink alcohol and does not use drugs. Social History     Social History Narrative    Not on file   Social smoker in early 20's. Rare alcohol on Holidays. 4 children, all grown (youngest is 21). 2 grandchildren, both in the area. 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 DATA  Visit Vitals  BP (!) 140/81   Pulse 92   Temp 98.5 °F (36.9 °C)   Resp 20   Ht 5' 7\" (1.702 m)   Wt 338 lb (153.3 kg)   BMI 52.94 kg/m²    Body mass index is 52.94 kg/m². No flowsheet data found.     General:  The patient is pleasant, morbidly obese, alert, and in no apparent distress. Eyes: Sclera are anicteric. No conjunctival injection. HEENT:  Oropharynx is clear. No oral ulcers. Adequate salivary pooling. No cervical or supraclavicular lymphadenopathy. Lungs:  Clear to auscultation bilaterally, without wheeze or stridor. Normal respiratory effort. Cor:  Regular rate and rhythm. No murmur rub or gallop. Abdomen: Soft, non-tender, without hepatomegaly or masses. Extremities: No calf tenderness or edema. Warm and well perfused. Skin: Tattoos without nodularity or fading. No significant abnormalities. Neuro: Nonfocal. Negative seated SLR bilaterally. Musculoskeletal:    A comprehensive musculoskeletal exam was performed for all joints of each upper and lower extremity and assessed for swelling, tenderness and range of motion. Results are documented as below:  No FMTP. Diffuse paraspinous tightness, trapezius to supragluteal region most prominently in low back. Bilateral trochanteric tendernes  No evidence of synovitis in the small joints of the hands, wrists, shoulders, elbows, hips, knees or ankles. Labs:  21: WBC 13.7, Hgb 9.6, Plt 406; ESR 29, ; Cr 0.60, LFTs WNL, iron 4%sat, ferritin 7; A1c 6.0, RF <10, CCP neg; TSH 5.8, DARLING direct neg, DARLING IFA neg; CRP 10mg/L, C3 high, C4 WNL, Hep C Ab neg;     Imagin20 CT abd/pelvis with:  No evidence of acute abdominal or pelvic process. No significant change from the  prior study. ASSESSMENT AND PLAN  Ms. Brittni Segura is a 42 y.o. female with history of hypothyroidism and iron deficiency anemia who presents for evaluation of fatigue and primarily dependent degenerative-character arthralgia and myalgias in keeping with muscular strain and bursitis related primarily to habitus. Referring her to PT to help with core strengthening and posture. Provided reassurance she has no active synovitis or e/o a connective tissue disease, and labs have also been reassuring.      1. Trochanteric bursitis of both hips  - REFERRAL TO PHYSICAL THERAPY  - Cont OTC NSAIDs as needed  - Consider trochanteric injections through ortho or PCP if PT unhelpful, though if underlying risk factors aren't addressed then will continue to recur. 2. Lumbar facet arthropathy  - REFERRAL TO PHYSICAL THERAPY, consideration of TENS    3. Primary osteoarthritis involving multiple joints  --Reviewed the fourfold reduction in knee forces for every unit of weight lost (Arthritis Rheum. 2005 Jul;52(7):2026-32)   - REFERRAL TO PHYSICAL THERAPY    4. Morbid obesity ([address]  - REFERRAL TO PHYSICAL THERAPY    Patient Instructions   1. I think most of your back and lower body pain is related to muscular strain and mechanical stresses. Your low back pain fits a pattern of lumbar facet arthropathy. Sometimes, hidradenitis can cause joint inflammation, but at the moment your skin is well-controlled, and I don't think this explains your current pains. 2. Physical therapy for your low back, hips, and knees. I'm asking them to help you plan water therapy, and to trial a course of TENS for the low back. 3. Keep working with your PCP and Hematology to improve your iron levels, I think this will help the fatigue. For now, would favor tylenol 1000mg up to 2-3x/day over NSAIDs, since NSAIDs like Aleve can irritate the stomach lining and worsen iron deficiency. 4. If you feel motivated, I'd agree that bariatric surgery should improve some of your joint pain. There is a fourfold reduction in knee forces for every unit of weight lost (Arthritis Rheum. 2005 Jul;52(7):2026-32)     5. Return if you develop new rashes, joint swelling, or inflammatory eye disease (scleritis, uveitis, or persistent eye redness/inflammation). You're a return patient for up to 3 years.         Orders Placed This Encounter    REFERRAL TO PHYSICAL THERAPY    cholecalciferol (Vitamin D3) (1000 Units /25 mcg) tablet       Medications: I am having Vero CLARK Siva Solorzano" maintain her amLODIPine, albuterol, ferrous sulfate, metFORMIN ER, zolpidem, betamethasone valerate, levothyroxine, clonazePAM, and cholecalciferol. Follow up: Return if symptoms worsen or fail to improve.     Face to face time: 45 minutes  Note preparation and records review day of service: 25 minutes  Total provider time day of service: 70 minutes    Catalina Bazzi MD    Adult Rheumatology   [address] [place] [address], [address] [place] [address]   Phone [phone]  Fax [phone]

## 2021-11-10 ENCOUNTER — PATIENT MESSAGE (OUTPATIENT)
Dept: FAMILY MEDICINE CLINIC | Age: 41
End: 2021-11-10

## 2021-11-10 DIAGNOSIS — F41.9 ANXIETY: ICD-10-CM

## 2021-11-10 RX ORDER — CLONAZEPAM 1 MG/1
TABLET ORAL
Qty: 30 TABLET | Refills: 0 | Status: SHIPPED | OUTPATIENT
Start: 2021-11-10 | End: 2021-11-23 | Stop reason: DRUGHIGH

## 2021-11-23 ENCOUNTER — VIRTUAL VISIT (OUTPATIENT)
Dept: FAMILY MEDICINE CLINIC | Age: 41
End: 2021-11-23
Payer: COMMERCIAL

## 2021-11-23 DIAGNOSIS — F41.9 ANXIETY: Primary | ICD-10-CM

## 2021-11-23 PROCEDURE — 99213 OFFICE O/P EST LOW 20 MIN: CPT | Performed by: NURSE PRACTITIONER

## 2021-11-23 RX ORDER — CLONAZEPAM 2 MG/1
2 TABLET ORAL
Qty: 30 TABLET | Refills: 2 | Status: SHIPPED | OUTPATIENT
Start: 2021-11-23 | End: 2022-02-15

## 2021-11-23 NOTE — PROGRESS NOTES
Chief Complaint   Patient presents with    Anxiety    Medication Evaluation     Patient vv appt today for med check. Pt would like to discuss klonopin. Have noted an increase in usage of medication in the past yr. Have noted 2 tabs are required. Using 3-4 x per week. 1. Have you been to the ER, urgent care clinic since your last visit? Hospitalized since your last visit? No    2. Have you seen or consulted any other health care providers outside of the [address] [place] [address] since your last visit? Include any pap smears or colon screening.  No

## 2021-11-23 NOTE — PROGRESS NOTES
Effie Artis is a 42 y.o. female who was seen by synchronous (real-time) audio-video technology on 11/23/2021 for Anxiety and Medication Evaluation        Assessment & Plan:   Diagnoses and all orders for this visit:    1. Anxiety  -     clonazePAM (KlonoPIN) 2 mg tablet; Take 1 Tablet by mouth daily as needed (anxiety). Pt has been requiring 2 tabs for acute anxiety  Episodes. Recommended she consider a daily medication to better control anxiety but not interested in doing so. Has tried and failed multiple meds in the past. Will increase to 2 mg daily as needed and monitor use closely. Reinforced SEs/ADRs, only taking as prescribed, do not mix with etoh, and taking for acute sx of anxiety only. I spent at least 15 minutes on this visit with this established patient. 712  Subjective:     Chief Complaint   Patient presents with    Anxiety    Medication Evaluation     Patient vv appt today for med check. Pt would like to discuss klonopin. Have noted an increase in usage of medication in the past yr. Have noted 2 tabs are required. Using 3-4 x per week. Was on the 1 mg dose for many years and was not helping her acute anxiety episodes. Has tried other medications in the past but doesn't feel like she needs to take a daily medication for this. Has tried zoloft, cymbalta, prozac, and lexapro without being able to tolerate. Has things that trigger her anxiety. Prior to Admission medications    Medication Sig Start Date End Date Taking? Authorizing Provider   clonazePAM (KlonoPIN) 1 mg tablet TAKE 1 TABLET BY MOUTH EVERY DAY AS NEEDED FOR ANXIETY 11/10/21  Yes Mai Golden NP   albuterol (PROVENTIL HFA, VENTOLIN HFA, PROAIR HFA) 90 mcg/actuation inhaler Take 2 Puffs by inhalation every four (4) hours as needed for Wheezing. 11/9/21  Yes Prachi Haas MD   cholecalciferol (Vitamin D3) (1000 Units /25 mcg) tablet Take  by mouth daily.    Yes Provider, Historical   levothyroxine (SYNTHROID) 125 mcg tablet Take 1 Tablet by mouth Daily (before breakfast). 8/28/21  Yes Megan Call NP   ferrous sulfate 325 mg (65 mg iron) tablet Take 1 Tablet by mouth two (2) times a day. 7/29/21  Yes Megan Call NP   metFORMIN ER (GLUCOPHAGE XR) 500 mg tablet Take 1 Tablet by mouth daily (with dinner). 7/29/21  Yes Megan Call NP   amLODIPine (NORVASC) 5 mg tablet Take 1 Tablet by mouth daily. 6/24/21  Yes Lizett Khanna NP   zolpidem (AMBIEN) 5 mg tablet Take 1 Tablet by mouth nightly as needed for Sleep. Max Daily Amount: 5 mg. 8/26/21   Megan Call NP   betamethasone valerate (VALISONE) 0.1 % topical cream Apply very thin layer sparingly as needed to affected areas on arms no more than once daily. Patient not taking: Reported on 11/23/2021 8/26/21   Megan Call NP     Patient Active Problem List    Diagnosis Date Noted    Hypothyroidism 08/28/2021    Obesity, morbid ([facility] [address] 03/01/2018    Prediabetes 01/06/2017    Essential hypertension with goal blood pressure less than 140/90 07/27/2016    Asthma, mild intermittent 03/22/2016    Anxiety 10/14/2015     Current Outpatient Medications   Medication Sig Dispense Refill    clonazePAM (KlonoPIN) 2 mg tablet Take 1 Tablet by mouth daily as needed (anxiety). 30 Tablet 2    albuterol (PROVENTIL HFA, VENTOLIN HFA, PROAIR HFA) 90 mcg/actuation inhaler Take 2 Puffs by inhalation every four (4) hours as needed for Wheezing. 1 Each 5    cholecalciferol (Vitamin D3) (1000 Units /25 mcg) tablet Take  by mouth daily.  levothyroxine (SYNTHROID) 125 mcg tablet Take 1 Tablet by mouth Daily (before breakfast). 30 Tablet 2    ferrous sulfate 325 mg (65 mg iron) tablet Take 1 Tablet by mouth two (2) times a day. 180 Tablet 1    metFORMIN ER (GLUCOPHAGE XR) 500 mg tablet Take 1 Tablet by mouth daily (with dinner). 90 Tablet 1    amLODIPine (NORVASC) 5 mg tablet Take 1 Tablet by mouth daily.  90 Tablet 1     Allergies   Allergen Reactions    Bactrim [Sulfamethoprim Ds] Hives       ROS    Objective:     Patient-Reported Vitals 6/8/2020   Patient-Reported Weight 320   Patient-Reported Height 58   Patient-Reported LMP 05/15/2020      General: alert, cooperative, no distress   Mental  status: normal mood, behavior   HENT: NCAT   Neck: no visualized mass   Resp: no respiratory distress                 Additional exam findings: We discussed the expected course, resolution and complications of the diagnosis(es) in detail. Medication risks, benefits, costs, interactions, and alternatives were discussed as indicated. I advised her to contact the office if her condition worsens, changes or fails to improve as anticipated. She expressed understanding with the diagnosis(es) and plan. Demian Eli, was evaluated through a synchronous (real-time) audio-video encounter. The patient (or guardian if applicable) is aware that this is a billable service. Verbal consent to proceed has been obtained within the past 12 months. The visit was conducted pursuant to the emergency declaration under the [address] [place] [address] authority and the [email or web address] and [employer] General Act. Patient identification was verified, and a caregiver was present when appropriate. The patient was located in a state where the provider was credentialed to provide care.     Zarina Salvador NP

## 2021-12-08 NOTE — PROGRESS NOTES
[address] [facility] Oncology at [facility]  [phone]    Hematology / Oncology Consult    Reason for Visit:   Roxana Lind is a 42 y.o. female who is seen in consultation at the request of SONALI Escobar for evaluation of anemia. History of Present Illness:   Roxana Lind is a 42 y.o. female who is here for evaluation of anemia. Medical history significant for anxiety, osteoarthritis, asthma, hypothyroidism, and morbid obesity. No melena/hematochezia/hematuria. She endorses heavy menstrual periods requiring > 4-5 pads/tampons per day lasting 3-4 days, then lighter afterward. Has seen Gyn and used to take OCPs. She states they didn't help to reduce her periods. She has tried iron supplements twice daily, but has found it difficult to keep up with this given they cause stomach upset and nausea. She has not tried taking with food. She endorses ice cravings and fatigue. She stopped taking NSAIDs for OA after she was cautioned about risk of gastritis. Did have a EGD (normal) and colonoscopy (normal) per patient several years ago done given stomach discomfort. Past Medical History:   Diagnosis Date    Anxiety     Asthma     Hypertension     Rotator cuff tear     Seasonal allergic rhinitis       Past Surgical History:   Procedure Laterality Date    HX CHOLECYSTECTOMY      HX CYST REMOVAL      HX ORTHOPAEDIC      ankle surgery    HX TUBAL LIGATION        Social History     Tobacco Use    Smoking status: Former Smoker    Smokeless tobacco: Never Used   Substance Use Topics    Alcohol use: No     Alcohol/week: 0.0 standard drinks      Family History   Problem Relation Age of Onset    Hypertension Mother     Hypertension Sister     Cancer Maternal Aunt     Hypertension Brother      Current Outpatient Medications   Medication Sig    clonazePAM (KlonoPIN) 2 mg tablet Take 1 Tablet by mouth daily as needed (anxiety).     albuterol (PROVENTIL HFA, VENTOLIN HFA, PROAIR HFA) 90 mcg/actuation inhaler Take 2 Puffs by inhalation every four (4) hours as needed for Wheezing.  cholecalciferol (Vitamin D3) (1000 Units /25 mcg) tablet Take  by mouth daily.  levothyroxine (SYNTHROID) 125 mcg tablet Take 1 Tablet by mouth Daily (before breakfast).  ferrous sulfate 325 mg (65 mg iron) tablet Take 1 Tablet by mouth two (2) times a day.  metFORMIN ER (GLUCOPHAGE XR) 500 mg tablet Take 1 Tablet by mouth daily (with dinner).  amLODIPine (NORVASC) 5 mg tablet Take 1 Tablet by mouth daily. No current facility-administered medications for this visit. Allergies   Allergen Reactions    Bactrim [Sulfamethoprim Ds] Hives        Review of Systems: A complete review of systems was obtained, negative except as described above. Physical Exam:   Blood pressure (!) 177/108, pulse 98, resp. rate 16, height 5' 7\" (1.702 m), weight (!) 357 lb (161.9 kg), SpO2 96 %. ECOG PS: 0  General: no distress, obese  Eyes: PERRLA, anicteric sclerae  HENT: atraumatic, oropharynx clear. Neck: supple, no JVD or thyromegaly  Lymphatic: no cervical, supraclavicular, or axillary adenopathy  Respiratory: CTAB, normal respiratory effort  CV: normal rate, regular rhythm, no murmurs, no peripheral edema  GI: soft, nontender, nondistended, no masses, no hepatomegaly, no splenomegaly  MS: digits without clubbing or cyanosis. Skin: no rashes, no ecchymoses, no petechia. normal temperature, turgor, and texture. Psych: alert, oriented, appropriate affect, normal judgment/insight    Results:     Lab Results   Component Value Date/Time    WBC 14.1 (H) 09/01/2021 03:23 AM    HGB 9.3 (L) 09/01/2021 03:23 AM    HCT 32.6 (L) 09/01/2021 03:23 AM    PLATELET 692 (H) 71/30/5212 03:23 AM    MCV 72.6 (L) 09/01/2021 03:23 AM    ABS.  NEUTROPHILS 9.6 (H) 09/01/2021 03:23 AM     Lab Results   Component Value Date/Time    Sodium 142 09/01/2021 03:23 AM    Potassium 3.5 09/01/2021 03:23 AM    Chloride 104 09/01/2021 03:23 AM CO2 27 09/01/2021 03:23 AM    Glucose 105 (H) 09/01/2021 03:23 AM    BUN 9 09/01/2021 03:23 AM    Creatinine 0.77 09/01/2021 03:23 AM    GFR est AA >60 09/01/2021 03:23 AM    GFR est non-AA >60 09/01/2021 03:23 AM    Calcium 8.3 (L) 09/01/2021 03:23 AM     Lab Results   Component Value Date/Time    Bilirubin, total 0.2 09/01/2021 03:23 AM    ALT (SGPT) 18 09/01/2021 03:23 AM    Alk. phosphatase 75 09/01/2021 03:23 AM    Protein, total 7.4 09/01/2021 03:23 AM    Albumin 3.1 (L) 09/01/2021 03:23 AM    Globulin 4.3 (H) 09/01/2021 03:23 AM     Lab Results   Component Value Date/Time    Iron 19 (L) 08/26/2021 12:00 AM    TIBC 467 (H) 08/26/2021 12:00 AM    Iron % saturation 4 (LL) 08/26/2021 12:00 AM    Ferritin 8 (L) 08/26/2021 12:00 AM       No results found for: B12LT, FOL, RBCF  Lab Results   Component Value Date/Time    TSH 5.610 (H) 08/26/2021 12:00 AM     Lab Results   Component Value Date/Time    Hep C Virus Ab 0.1 07/26/2021 08:34 AM         Imaging:     Radiology report(s) reviewed. Assessment & Plan:   Dodie Dawson is a 42 y.o. female is seen for iron deficiency anemia. 1. Iron deficiency anemia:  2/2 menorrhagia. Has had chronic microcytic anemia since 2017 based on review of labs in our system. The patient has not been able to tolerate PO iron replacement. My recommendation is for IV iron therapy. We will set the patient up for therapy in the [facility] with injectafer 750mg IV weekly x2 doses. We discussed the potential risks of therapy, including allergic reaction, and the patient has consented to treatment. Plan to repeat CBC and ferritin 2-3 months after iron is completed to ensure an appropriate response. -- Recheck cbc, iron profile, ferritin now - send message or call with results. Need repeat labs to ensure iron infusions are covered. -- If significant MEGAN still present, will have pt proceed with Injectafer x 2 and alert scheduling  -- Trial of bid iron supplements with food.    -- Follow up in 12 weeks labs, MD visit    2. Thrombocytosis:  Mild. Likely reactive due to anemia and should improve as anemia is corrected    3. Leukocytosis:  Mostly neutrophils and likely reactive. Nonsmoker. 4. Menorrhagia:  Sees Dr. Sunita Gupta in [place]. States prior OCPs did not help decrease severity of menorrhagia. Urged pt to follow up to discuss additional methods to management menorrhagia since it is causing her iron deficiency anemia. Emotional well being: Pt is coping well with his/her disease and has excellent support. I appreciate the opportunity to participate in Ms. Vero Hallman's care.     Signed By: Isatu Goodwin MD     December 9, 2021

## 2021-12-09 ENCOUNTER — OFFICE VISIT (OUTPATIENT)
Dept: ONCOLOGY | Age: 41
End: 2021-12-09
Payer: COMMERCIAL

## 2021-12-09 VITALS
WEIGHT: 293 LBS | HEIGHT: 67 IN | SYSTOLIC BLOOD PRESSURE: 177 MMHG | OXYGEN SATURATION: 96 % | HEART RATE: 98 BPM | DIASTOLIC BLOOD PRESSURE: 108 MMHG | RESPIRATION RATE: 16 BRPM | BODY MASS INDEX: 45.99 KG/M2

## 2021-12-09 DIAGNOSIS — D72.9 NEUTROPHILIA: ICD-10-CM

## 2021-12-09 DIAGNOSIS — D50.0 IRON DEFICIENCY ANEMIA DUE TO CHRONIC BLOOD LOSS: ICD-10-CM

## 2021-12-09 DIAGNOSIS — D50.0 IRON DEFICIENCY ANEMIA DUE TO CHRONIC BLOOD LOSS: Primary | ICD-10-CM

## 2021-12-09 DIAGNOSIS — N92.0 MENORRHAGIA WITH REGULAR CYCLE: ICD-10-CM

## 2021-12-09 DIAGNOSIS — D75.839 THROMBOCYTOSIS: ICD-10-CM

## 2021-12-09 LAB
BASOPHILS # BLD: 0 K/UL (ref 0–0.1)
BASOPHILS NFR BLD: 0 % (ref 0–1)
DIFFERENTIAL METHOD BLD: ABNORMAL
EOSINOPHIL # BLD: 0.5 K/UL (ref 0–0.4)
EOSINOPHIL NFR BLD: 4 % (ref 0–7)
ERYTHROCYTE [DISTWIDTH] IN BLOOD BY AUTOMATED COUNT: 22.6 % (ref 11.5–14.5)
FERRITIN SERPL-MCNC: 8 NG/ML (ref 26–388)
HCT VFR BLD AUTO: 34.6 % (ref 35–47)
HGB BLD-MCNC: 9.8 G/DL (ref 11.5–16)
IMM GRANULOCYTES # BLD AUTO: 0 K/UL (ref 0–0.04)
IMM GRANULOCYTES NFR BLD AUTO: 0 % (ref 0–0.5)
IRON SATN MFR SERPL: 6 % (ref 20–50)
IRON SERPL-MCNC: 29 UG/DL (ref 35–150)
LYMPHOCYTES # BLD: 3.1 K/UL (ref 0.8–3.5)
LYMPHOCYTES NFR BLD: 25 % (ref 12–49)
MCH RBC QN AUTO: 21.9 PG (ref 26–34)
MCHC RBC AUTO-ENTMCNC: 28.3 G/DL (ref 30–36.5)
MCV RBC AUTO: 77.4 FL (ref 80–99)
MONOCYTES # BLD: 0.7 K/UL (ref 0–1)
MONOCYTES NFR BLD: 6 % (ref 5–13)
NEUTS SEG # BLD: 8.1 K/UL (ref 1.8–8)
NEUTS SEG NFR BLD: 65 % (ref 32–75)
NRBC # BLD: 0 K/UL (ref 0–0.01)
NRBC BLD-RTO: 0 PER 100 WBC
PLATELET # BLD AUTO: 451 K/UL (ref 150–400)
PMV BLD AUTO: 10.7 FL (ref 8.9–12.9)
RBC # BLD AUTO: 4.47 M/UL (ref 3.8–5.2)
RBC MORPH BLD: ABNORMAL
TIBC SERPL-MCNC: 500 UG/DL (ref 250–450)
WBC # BLD AUTO: 12.4 K/UL (ref 3.6–11)

## 2021-12-09 PROCEDURE — 99244 OFF/OP CNSLTJ NEW/EST MOD 40: CPT | Performed by: INTERNAL MEDICINE

## 2021-12-09 RX ORDER — TRAMADOL HYDROCHLORIDE 50 MG/1
TABLET ORAL
COMMUNITY
Start: 2021-12-07 | End: 2021-12-16

## 2021-12-09 NOTE — PROGRESS NOTES
Chief Complaint   Patient presents with    New Patient     Blake Rucker is a pleasant 42year old woman who presents as a new patient for anemia.  She denies pain

## 2021-12-09 NOTE — LETTER
12/9/2021    Patient: Effie Artis   YOB: 1980   Date of Visit: 12/9/2021     Girma Crowe NP  [address] [address]  Via In Basket    Dear Girma Crowe NP,      Thank you for referring Ms. Toya Fox to [facility] for evaluation. My notes for this consultation are attached. If you have questions, please do not hesitate to call me. I look forward to following your patient along with you.       Sincerely,    Marissa Harrison MD

## 2021-12-09 NOTE — LETTER
12/9/2021 11:30 AM    Patient:  Diana Paz   YOB: 1980  Date of Visit: 12/9/2021      Ninoska Forman, [address] [address]  Via Fax: [phone]    Dear Ninoska Forman MD,    Thank you for caring for our mutual patient, Ms. Anthony Hurtado. I have attached my recent progress note for your review. If you have questions, please do not hesitate to call me. I look forward to following Ms. Sharyle King along with you and will keep you updated as to her progress.            Sincerely,      David Field MD

## 2021-12-10 NOTE — PROGRESS NOTES
Please notify patient her iron levels are indeed low along with anemia. I recommend Injectafer x 2. Please order.

## 2021-12-13 PROBLEM — D50.0 IRON DEFICIENCY ANEMIA DUE TO CHRONIC BLOOD LOSS: Status: ACTIVE | Noted: 2021-12-13

## 2021-12-13 RX ORDER — EPINEPHRINE 1 MG/ML
0.3 INJECTION, SOLUTION, CONCENTRATE INTRAVENOUS AS NEEDED
Status: CANCELLED | OUTPATIENT
Start: 2021-12-28

## 2021-12-13 RX ORDER — ALBUTEROL SULFATE 0.83 MG/ML
2.5 SOLUTION RESPIRATORY (INHALATION) AS NEEDED
Status: CANCELLED
Start: 2021-12-28

## 2021-12-13 RX ORDER — ACETAMINOPHEN 325 MG/1
650 TABLET ORAL AS NEEDED
Status: CANCELLED
Start: 2021-12-28

## 2021-12-13 RX ORDER — SODIUM CHLORIDE 9 MG/ML
25 INJECTION, SOLUTION INTRAVENOUS CONTINUOUS
Status: CANCELLED | OUTPATIENT
Start: 2021-12-21

## 2021-12-13 RX ORDER — ONDANSETRON 2 MG/ML
8 INJECTION INTRAMUSCULAR; INTRAVENOUS AS NEEDED
Status: CANCELLED | OUTPATIENT
Start: 2021-12-21

## 2021-12-13 RX ORDER — SODIUM CHLORIDE 9 MG/ML
10 INJECTION INTRAMUSCULAR; INTRAVENOUS; SUBCUTANEOUS AS NEEDED
Status: CANCELLED | OUTPATIENT
Start: 2021-12-21

## 2021-12-13 RX ORDER — ACETAMINOPHEN 325 MG/1
650 TABLET ORAL AS NEEDED
Status: CANCELLED
Start: 2021-12-21

## 2021-12-13 RX ORDER — EPINEPHRINE 1 MG/ML
0.3 INJECTION, SOLUTION, CONCENTRATE INTRAVENOUS AS NEEDED
Status: CANCELLED | OUTPATIENT
Start: 2021-12-21

## 2021-12-13 RX ORDER — HEPARIN 100 UNIT/ML
300-500 SYRINGE INTRAVENOUS AS NEEDED
Status: CANCELLED
Start: 2021-12-28

## 2021-12-13 RX ORDER — HYDROCORTISONE SODIUM SUCCINATE 100 MG/2ML
100 INJECTION, POWDER, FOR SOLUTION INTRAMUSCULAR; INTRAVENOUS AS NEEDED
Status: CANCELLED | OUTPATIENT
Start: 2021-12-21

## 2021-12-13 RX ORDER — SODIUM CHLORIDE 9 MG/ML
10 INJECTION INTRAMUSCULAR; INTRAVENOUS; SUBCUTANEOUS AS NEEDED
Status: CANCELLED | OUTPATIENT
Start: 2021-12-28

## 2021-12-13 RX ORDER — SODIUM CHLORIDE 9 MG/ML
25 INJECTION, SOLUTION INTRAVENOUS CONTINUOUS
Status: CANCELLED | OUTPATIENT
Start: 2021-12-28

## 2021-12-13 RX ORDER — SODIUM CHLORIDE 0.9 % (FLUSH) 0.9 %
10 SYRINGE (ML) INJECTION AS NEEDED
Status: CANCELLED | OUTPATIENT
Start: 2021-12-21

## 2021-12-13 RX ORDER — ONDANSETRON 2 MG/ML
8 INJECTION INTRAMUSCULAR; INTRAVENOUS AS NEEDED
Status: CANCELLED | OUTPATIENT
Start: 2021-12-28

## 2021-12-13 RX ORDER — DIPHENHYDRAMINE HYDROCHLORIDE 50 MG/ML
50 INJECTION, SOLUTION INTRAMUSCULAR; INTRAVENOUS AS NEEDED
Status: CANCELLED
Start: 2021-12-21

## 2021-12-13 RX ORDER — DIPHENHYDRAMINE HYDROCHLORIDE 50 MG/ML
50 INJECTION, SOLUTION INTRAMUSCULAR; INTRAVENOUS AS NEEDED
Status: CANCELLED
Start: 2021-12-28

## 2021-12-13 RX ORDER — ALBUTEROL SULFATE 0.83 MG/ML
2.5 SOLUTION RESPIRATORY (INHALATION) AS NEEDED
Status: CANCELLED
Start: 2021-12-21

## 2021-12-13 RX ORDER — SODIUM CHLORIDE 0.9 % (FLUSH) 0.9 %
10 SYRINGE (ML) INJECTION AS NEEDED
Status: CANCELLED | OUTPATIENT
Start: 2021-12-28

## 2021-12-13 RX ORDER — HYDROCORTISONE SODIUM SUCCINATE 100 MG/2ML
100 INJECTION, POWDER, FOR SOLUTION INTRAMUSCULAR; INTRAVENOUS AS NEEDED
Status: CANCELLED | OUTPATIENT
Start: 2021-12-28

## 2021-12-13 RX ORDER — DIPHENHYDRAMINE HYDROCHLORIDE 50 MG/ML
25 INJECTION, SOLUTION INTRAMUSCULAR; INTRAVENOUS AS NEEDED
Status: CANCELLED
Start: 2021-12-28

## 2021-12-13 RX ORDER — HEPARIN 100 UNIT/ML
300-500 SYRINGE INTRAVENOUS AS NEEDED
Status: CANCELLED
Start: 2021-12-21

## 2021-12-13 RX ORDER — DIPHENHYDRAMINE HYDROCHLORIDE 50 MG/ML
25 INJECTION, SOLUTION INTRAMUSCULAR; INTRAVENOUS AS NEEDED
Status: CANCELLED
Start: 2021-12-21

## 2021-12-13 NOTE — PROGRESS NOTES
Attempted to call patient via home/cell phone number listed. No answer and unable to leave voicemail. Will attempt to call patient again later if no return call received.

## 2021-12-13 NOTE — PROGRESS NOTES
Called patient and advised of lab results and recommendations per Erin Batres NP. Reviewed administration process for Injectafer. Patient voiced understanding. Denies any further questions or concerns at this time and agreeable to plan. Call transferred to [facility]  to further assist in scheduling iron infusions.

## 2021-12-16 ENCOUNTER — HOSPITAL ENCOUNTER (EMERGENCY)
Age: 41
Discharge: HOME | End: 2021-12-17
Attending: EMERGENCY MEDICINE
Payer: COMMERCIAL

## 2021-12-16 VITALS
OXYGEN SATURATION: 96 % | HEIGHT: 67 IN | SYSTOLIC BLOOD PRESSURE: 160 MMHG | HEART RATE: 105 BPM | DIASTOLIC BLOOD PRESSURE: 92 MMHG | TEMPERATURE: 98.9 F | BODY MASS INDEX: 45.99 KG/M2 | RESPIRATION RATE: 16 BRPM | WEIGHT: 293 LBS

## 2021-12-16 DIAGNOSIS — R30.0 DYSURIA: Primary | ICD-10-CM

## 2021-12-16 LAB
APPEARANCE UR: ABNORMAL
BACTERIA URNS QL MICRO: ABNORMAL /HPF
BILIRUB UR QL: NEGATIVE
COLOR UR: ABNORMAL
EPITH CASTS URNS QL MICRO: ABNORMAL /LPF
GLUCOSE UR STRIP.AUTO-MCNC: NEGATIVE MG/DL
HGB UR QL STRIP: NEGATIVE
KETONES UR QL STRIP.AUTO: NEGATIVE MG/DL
LEUKOCYTE ESTERASE UR QL STRIP.AUTO: NEGATIVE
NITRITE UR QL STRIP.AUTO: NEGATIVE
PH UR STRIP: 5 [PH] (ref 5–8)
PROT UR STRIP-MCNC: NEGATIVE MG/DL
RBC #/AREA URNS HPF: ABNORMAL /HPF (ref 0–5)
SP GR UR REFRACTOMETRY: >1.03 (ref 1–1.03)
UR CULT HOLD, URHOLD: NORMAL
UROBILINOGEN UR QL STRIP.AUTO: 0.2 EU/DL (ref 0.2–1)
WBC URNS QL MICRO: ABNORMAL /HPF (ref 0–4)

## 2021-12-16 PROCEDURE — 81025 URINE PREGNANCY TEST: CPT

## 2021-12-16 PROCEDURE — 81001 URINALYSIS AUTO W/SCOPE: CPT

## 2021-12-16 PROCEDURE — 99283 EMERGENCY DEPT VISIT LOW MDM: CPT

## 2021-12-16 RX ORDER — CEPHALEXIN 500 MG/1
500 CAPSULE ORAL 3 TIMES DAILY
Qty: 15 CAPSULE | Refills: 0 | Status: SHIPPED | OUTPATIENT
Start: 2021-12-16 | End: 2021-12-21

## 2021-12-17 NOTE — ED PROVIDER NOTES
History of hypertension, asthma, anxiety. She presents with complaints of a 3-day history of urinary urgency, discomfort with urination, hesitancy. She denies hematuria. She has had some mild lower abdominal discomfort and bilateral lower back pain. She has a history of UTIs and her symptoms seem consistent with that. She has had a decreased appetite but has been drinking well. She has had nausea but no vomiting. Past Medical History:   Diagnosis Date    Anxiety     Asthma     Hypertension     Rotator cuff tear     Seasonal allergic rhinitis        Past Surgical History:   Procedure Laterality Date    HX CHOLECYSTECTOMY      HX CYST REMOVAL      HX ORTHOPAEDIC      ankle surgery    HX TUBAL LIGATION           Family History:   Problem Relation Age of Onset    Hypertension Mother     Hypertension Sister     Cancer Maternal Aunt     Hypertension Brother        Social History     Socioeconomic History    Marital status: SINGLE     Spouse name: Not on file    Number of children: Not on file    Years of education: Not on file    Highest education level: Not on file   Occupational History    Not on file   Tobacco Use    Smoking status: Former Smoker    Smokeless tobacco: Never Used   Substance and Sexual Activity    Alcohol use: No     Alcohol/week: 0.0 standard drinks    Drug use: No    Sexual activity: Yes     Partners: Male     Birth control/protection: Condom   Other Topics Concern    Not on file   Social History Narrative    Not on file     Social Determinants of Health     Financial Resource Strain:     Difficulty of Paying Living Expenses: Not on file   Food Insecurity:     Worried About [address] in the Last Year: Not on file    [address] in the Last Year: Not on file   Transportation Needs:     Lack of Transportation (Medical): Not on file    Lack of Transportation (Non-Medical):  Not on file   Physical Activity:     Days of Exercise per Week: Not on file    Minutes of Exercise per Session: Not on file   Stress:     Feeling of Stress : Not on file   Social Connections:     Frequency of Communication with Friends and Family: Not on file    Frequency of Social Gatherings with Friends and Family: Not on file    Attends Quaker Services: Not on file    Active Member of Clubs or Organizations: Not on file    Attends Club or Organization Meetings: Not on file    Marital Status: Not on file   Intimate Partner Violence:     Fear of Current or Ex-Partner: Not on file    Emotionally Abused: Not on file    Physically Abused: Not on file    Sexually Abused: Not on file   Housing Stability:     Unable to Pay for Housing in the Last Year: Not on file    Number of [place] in the Last Year: Not on file    Unstable Housing in the Last Year: Not on file         ALLERGIES: Bactrim [sulfamethoprim ds] and Tramadol    Review of Systems   All other systems reviewed and are negative. Vitals:    12/16/21 2300   BP: (!) 160/92   Pulse: (!) 105   Resp: 16   Temp: 98.9 °F (37.2 °C)   SpO2: 96%   Weight: 157.9 kg (348 lb 1.7 oz)   Height: 5' 7\" (1.702 m)            Physical Exam  Vitals and nursing note reviewed. Constitutional:       Appearance: She is well-developed. HENT:      Head: Normocephalic and atraumatic. Eyes:      Conjunctiva/sclera: Conjunctivae normal.   Neck:      Trachea: No tracheal deviation. Cardiovascular:      Rate and Rhythm: Normal rate and regular rhythm. Heart sounds: Normal heart sounds. No murmur heard. No friction rub. No gallop. Pulmonary:      Effort: Pulmonary effort is normal.      Breath sounds: Normal breath sounds. Abdominal:      Palpations: Abdomen is soft. Comments: Mild suprapubic tenderness. Musculoskeletal:         General: No deformity. Cervical back: Neck supple. Skin:     General: Skin is warm and dry. Neurological:      Mental Status: She is alert.       Comments: oriented MDM       Procedures    Progress Note:  Results, treatment, and follow up plan have been discussed with patient. Questions were answered. Donato Kim MD  11:48 PM    Assessment/plan: 3-day history of urinary urgency, discomfort with urination, hesitancy. She also has some low abdominal and low back pain. No fever or vomiting. Reassuring appearance/exam with stable vital signs. No peritoneal signs. UA is unimpressive but will treat based on symptoms with Keflex. PCP follow-up. Return precautions discussed.   Donato Kim MD  11:49 PM

## 2021-12-17 NOTE — ED NOTES
The patient was discharged home by Dr. Annette Dotson and Rosario Kurtz rn in stable condition, accompanied by family. The patient is alert and oriented, is in no respiratory distress and has vital signs within normal limits . The patient's diagnosis, condition and treatment were explained to patient. The patient expressed understanding. Prescriptions given to pt. No work/school note given to pt. A discharge plan has been developed. A  was not involved in the process. Aftercare instructions were given to the patient. Family will transport pt home.

## 2021-12-21 LAB — HCG UR QL: NEGATIVE

## 2021-12-21 RX ORDER — SODIUM CHLORIDE 0.9 % (FLUSH) 0.9 %
10 SYRINGE (ML) INJECTION AS NEEDED
Status: CANCELLED | OUTPATIENT
Start: 2022-01-31

## 2021-12-21 RX ORDER — ACETAMINOPHEN 325 MG/1
650 TABLET ORAL AS NEEDED
Status: CANCELLED
Start: 2022-01-27

## 2021-12-21 RX ORDER — HYDROCORTISONE SODIUM SUCCINATE 100 MG/2ML
100 INJECTION, POWDER, FOR SOLUTION INTRAMUSCULAR; INTRAVENOUS AS NEEDED
Status: CANCELLED | OUTPATIENT
Start: 2022-01-27

## 2021-12-21 RX ORDER — ONDANSETRON 2 MG/ML
8 INJECTION INTRAMUSCULAR; INTRAVENOUS AS NEEDED
Status: CANCELLED | OUTPATIENT
Start: 2022-01-19

## 2021-12-21 RX ORDER — SODIUM CHLORIDE 9 MG/ML
25 INJECTION, SOLUTION INTRAVENOUS CONTINUOUS
Status: CANCELLED | OUTPATIENT
Start: 2022-01-31

## 2021-12-21 RX ORDER — ACETAMINOPHEN 325 MG/1
650 TABLET ORAL AS NEEDED
Status: CANCELLED
Start: 2022-01-14

## 2021-12-21 RX ORDER — SODIUM CHLORIDE 0.9 % (FLUSH) 0.9 %
10 SYRINGE (ML) INJECTION AS NEEDED
Status: CANCELLED | OUTPATIENT
Start: 2022-01-14

## 2021-12-21 RX ORDER — SODIUM CHLORIDE 9 MG/ML
10 INJECTION INTRAMUSCULAR; INTRAVENOUS; SUBCUTANEOUS AS NEEDED
Status: CANCELLED | OUTPATIENT
Start: 2022-01-31

## 2021-12-21 RX ORDER — ONDANSETRON 2 MG/ML
8 INJECTION INTRAMUSCULAR; INTRAVENOUS AS NEEDED
Status: CANCELLED | OUTPATIENT
Start: 2022-01-14

## 2021-12-21 RX ORDER — HYDROCORTISONE SODIUM SUCCINATE 100 MG/2ML
100 INJECTION, POWDER, FOR SOLUTION INTRAMUSCULAR; INTRAVENOUS AS NEEDED
Status: CANCELLED | OUTPATIENT
Start: 2022-01-31

## 2021-12-21 RX ORDER — EPINEPHRINE 1 MG/ML
0.3 INJECTION, SOLUTION, CONCENTRATE INTRAVENOUS AS NEEDED
Status: CANCELLED | OUTPATIENT
Start: 2022-01-27

## 2021-12-21 RX ORDER — DIPHENHYDRAMINE HYDROCHLORIDE 50 MG/ML
25 INJECTION, SOLUTION INTRAMUSCULAR; INTRAVENOUS AS NEEDED
Status: CANCELLED
Start: 2022-01-25

## 2021-12-21 RX ORDER — DIPHENHYDRAMINE HYDROCHLORIDE 50 MG/ML
50 INJECTION, SOLUTION INTRAMUSCULAR; INTRAVENOUS AS NEEDED
Status: CANCELLED
Start: 2022-01-31

## 2021-12-21 RX ORDER — HEPARIN 100 UNIT/ML
300-500 SYRINGE INTRAVENOUS AS NEEDED
Status: CANCELLED
Start: 2022-01-31

## 2021-12-21 RX ORDER — EPINEPHRINE 1 MG/ML
0.3 INJECTION, SOLUTION, CONCENTRATE INTRAVENOUS AS NEEDED
Status: CANCELLED | OUTPATIENT
Start: 2022-01-19

## 2021-12-21 RX ORDER — DIPHENHYDRAMINE HYDROCHLORIDE 50 MG/ML
50 INJECTION, SOLUTION INTRAMUSCULAR; INTRAVENOUS AS NEEDED
Status: CANCELLED
Start: 2022-01-19

## 2021-12-21 RX ORDER — DIPHENHYDRAMINE HYDROCHLORIDE 50 MG/ML
50 INJECTION, SOLUTION INTRAMUSCULAR; INTRAVENOUS AS NEEDED
Status: CANCELLED
Start: 2022-01-27

## 2021-12-21 RX ORDER — HEPARIN 100 UNIT/ML
300-500 SYRINGE INTRAVENOUS AS NEEDED
Status: CANCELLED
Start: 2022-01-19

## 2021-12-21 RX ORDER — EPINEPHRINE 1 MG/ML
0.3 INJECTION, SOLUTION, CONCENTRATE INTRAVENOUS AS NEEDED
Status: CANCELLED | OUTPATIENT
Start: 2022-01-14

## 2021-12-21 RX ORDER — DIPHENHYDRAMINE HYDROCHLORIDE 50 MG/ML
25 INJECTION, SOLUTION INTRAMUSCULAR; INTRAVENOUS AS NEEDED
Status: CANCELLED
Start: 2022-01-27

## 2021-12-21 RX ORDER — SODIUM CHLORIDE 9 MG/ML
10 INJECTION INTRAMUSCULAR; INTRAVENOUS; SUBCUTANEOUS AS NEEDED
Status: CANCELLED | OUTPATIENT
Start: 2022-01-25

## 2021-12-21 RX ORDER — ONDANSETRON 2 MG/ML
8 INJECTION INTRAMUSCULAR; INTRAVENOUS AS NEEDED
Status: CANCELLED | OUTPATIENT
Start: 2022-01-31

## 2021-12-21 RX ORDER — SODIUM CHLORIDE 0.9 % (FLUSH) 0.9 %
10 SYRINGE (ML) INJECTION AS NEEDED
Status: CANCELLED | OUTPATIENT
Start: 2022-01-27

## 2021-12-21 RX ORDER — SODIUM CHLORIDE 9 MG/ML
25 INJECTION, SOLUTION INTRAVENOUS CONTINUOUS
Status: CANCELLED | OUTPATIENT
Start: 2022-01-14

## 2021-12-21 RX ORDER — EPINEPHRINE 1 MG/ML
0.3 INJECTION, SOLUTION, CONCENTRATE INTRAVENOUS AS NEEDED
Status: CANCELLED | OUTPATIENT
Start: 2022-01-31

## 2021-12-21 RX ORDER — HEPARIN 100 UNIT/ML
300-500 SYRINGE INTRAVENOUS AS NEEDED
Status: CANCELLED
Start: 2022-01-27

## 2021-12-21 RX ORDER — SODIUM CHLORIDE 9 MG/ML
25 INJECTION, SOLUTION INTRAVENOUS CONTINUOUS
Status: CANCELLED | OUTPATIENT
Start: 2022-01-27

## 2021-12-21 RX ORDER — HEPARIN 100 UNIT/ML
300-500 SYRINGE INTRAVENOUS AS NEEDED
Status: CANCELLED
Start: 2022-01-25

## 2021-12-21 RX ORDER — ALBUTEROL SULFATE 0.83 MG/ML
2.5 SOLUTION RESPIRATORY (INHALATION) AS NEEDED
Status: CANCELLED
Start: 2022-01-14

## 2021-12-21 RX ORDER — ONDANSETRON 2 MG/ML
8 INJECTION INTRAMUSCULAR; INTRAVENOUS AS NEEDED
Status: CANCELLED | OUTPATIENT
Start: 2022-01-27

## 2021-12-21 RX ORDER — EPINEPHRINE 1 MG/ML
0.3 INJECTION, SOLUTION, CONCENTRATE INTRAVENOUS AS NEEDED
Status: CANCELLED | OUTPATIENT
Start: 2022-01-25

## 2021-12-21 RX ORDER — DIPHENHYDRAMINE HYDROCHLORIDE 50 MG/ML
25 INJECTION, SOLUTION INTRAMUSCULAR; INTRAVENOUS AS NEEDED
Status: CANCELLED
Start: 2022-01-31

## 2021-12-21 RX ORDER — SODIUM CHLORIDE 0.9 % (FLUSH) 0.9 %
10 SYRINGE (ML) INJECTION AS NEEDED
Status: CANCELLED | OUTPATIENT
Start: 2022-01-25

## 2021-12-21 RX ORDER — HYDROCORTISONE SODIUM SUCCINATE 100 MG/2ML
100 INJECTION, POWDER, FOR SOLUTION INTRAMUSCULAR; INTRAVENOUS AS NEEDED
Status: CANCELLED | OUTPATIENT
Start: 2022-01-25

## 2021-12-21 RX ORDER — HEPARIN 100 UNIT/ML
300-500 SYRINGE INTRAVENOUS AS NEEDED
Status: CANCELLED
Start: 2022-01-14

## 2021-12-21 RX ORDER — ALBUTEROL SULFATE 0.83 MG/ML
2.5 SOLUTION RESPIRATORY (INHALATION) AS NEEDED
Status: CANCELLED
Start: 2022-01-25

## 2021-12-21 RX ORDER — ALBUTEROL SULFATE 0.83 MG/ML
2.5 SOLUTION RESPIRATORY (INHALATION) AS NEEDED
Status: CANCELLED
Start: 2022-01-27

## 2021-12-21 RX ORDER — HYDROCORTISONE SODIUM SUCCINATE 100 MG/2ML
100 INJECTION, POWDER, FOR SOLUTION INTRAMUSCULAR; INTRAVENOUS AS NEEDED
Status: CANCELLED | OUTPATIENT
Start: 2022-01-14

## 2021-12-21 RX ORDER — ONDANSETRON 2 MG/ML
8 INJECTION INTRAMUSCULAR; INTRAVENOUS AS NEEDED
Status: CANCELLED | OUTPATIENT
Start: 2022-01-25

## 2021-12-21 RX ORDER — ACETAMINOPHEN 325 MG/1
650 TABLET ORAL AS NEEDED
Status: CANCELLED
Start: 2022-01-19

## 2021-12-21 RX ORDER — ALBUTEROL SULFATE 0.83 MG/ML
2.5 SOLUTION RESPIRATORY (INHALATION) AS NEEDED
Status: CANCELLED
Start: 2022-01-19

## 2021-12-21 RX ORDER — SODIUM CHLORIDE 9 MG/ML
25 INJECTION, SOLUTION INTRAVENOUS CONTINUOUS
Status: CANCELLED | OUTPATIENT
Start: 2022-01-19

## 2021-12-21 RX ORDER — DIPHENHYDRAMINE HYDROCHLORIDE 50 MG/ML
50 INJECTION, SOLUTION INTRAMUSCULAR; INTRAVENOUS AS NEEDED
Status: CANCELLED
Start: 2022-01-14

## 2021-12-21 RX ORDER — SODIUM CHLORIDE 0.9 % (FLUSH) 0.9 %
10 SYRINGE (ML) INJECTION AS NEEDED
Status: CANCELLED | OUTPATIENT
Start: 2022-01-19

## 2021-12-21 RX ORDER — DIPHENHYDRAMINE HYDROCHLORIDE 50 MG/ML
25 INJECTION, SOLUTION INTRAMUSCULAR; INTRAVENOUS AS NEEDED
Status: CANCELLED
Start: 2022-01-14

## 2021-12-21 RX ORDER — HYDROCORTISONE SODIUM SUCCINATE 100 MG/2ML
100 INJECTION, POWDER, FOR SOLUTION INTRAMUSCULAR; INTRAVENOUS AS NEEDED
Status: CANCELLED | OUTPATIENT
Start: 2022-01-19

## 2021-12-21 RX ORDER — ACETAMINOPHEN 325 MG/1
650 TABLET ORAL AS NEEDED
Status: CANCELLED
Start: 2022-01-25

## 2021-12-21 RX ORDER — SODIUM CHLORIDE 9 MG/ML
10 INJECTION INTRAMUSCULAR; INTRAVENOUS; SUBCUTANEOUS AS NEEDED
Status: CANCELLED | OUTPATIENT
Start: 2022-01-14

## 2021-12-21 RX ORDER — SODIUM CHLORIDE 9 MG/ML
10 INJECTION INTRAMUSCULAR; INTRAVENOUS; SUBCUTANEOUS AS NEEDED
Status: CANCELLED | OUTPATIENT
Start: 2022-01-27

## 2021-12-21 RX ORDER — ACETAMINOPHEN 325 MG/1
650 TABLET ORAL AS NEEDED
Status: CANCELLED
Start: 2022-01-31

## 2021-12-21 RX ORDER — DIPHENHYDRAMINE HYDROCHLORIDE 50 MG/ML
50 INJECTION, SOLUTION INTRAMUSCULAR; INTRAVENOUS AS NEEDED
Status: CANCELLED
Start: 2022-01-25

## 2021-12-21 RX ORDER — ALBUTEROL SULFATE 0.83 MG/ML
2.5 SOLUTION RESPIRATORY (INHALATION) AS NEEDED
Status: CANCELLED
Start: 2022-01-31

## 2021-12-21 RX ORDER — SODIUM CHLORIDE 9 MG/ML
25 INJECTION, SOLUTION INTRAVENOUS CONTINUOUS
Status: CANCELLED | OUTPATIENT
Start: 2022-01-25

## 2021-12-21 RX ORDER — SODIUM CHLORIDE 9 MG/ML
10 INJECTION INTRAMUSCULAR; INTRAVENOUS; SUBCUTANEOUS AS NEEDED
Status: CANCELLED | OUTPATIENT
Start: 2022-01-19

## 2021-12-21 RX ORDER — DIPHENHYDRAMINE HYDROCHLORIDE 50 MG/ML
25 INJECTION, SOLUTION INTRAMUSCULAR; INTRAVENOUS AS NEEDED
Status: CANCELLED
Start: 2022-01-19

## 2021-12-22 ENCOUNTER — VIRTUAL VISIT (OUTPATIENT)
Dept: FAMILY MEDICINE CLINIC | Age: 41
End: 2021-12-22
Payer: COMMERCIAL

## 2021-12-22 DIAGNOSIS — I10 ESSENTIAL HYPERTENSION WITH GOAL BLOOD PRESSURE LESS THAN 140/90: Primary | ICD-10-CM

## 2021-12-22 PROCEDURE — 99213 OFFICE O/P EST LOW 20 MIN: CPT | Performed by: FAMILY MEDICINE

## 2021-12-22 RX ORDER — CHLORTHALIDONE 25 MG/1
25 TABLET ORAL DAILY
Qty: 30 TABLET | Refills: 1 | Status: SHIPPED | OUTPATIENT
Start: 2021-12-22 | End: 2022-01-13

## 2021-12-22 NOTE — PROGRESS NOTES
Progress Note    she is a 42y.o. year old female who presents for evalution. Subjective:     Patient reports that her blood pressures been elevated for the past few times it is been checked. She was without her OB/GYN this morning it was 170 over 90s. She is currently on amlodipine 5 mg once daily which she is tolerating. OB/GYN told to follow-up with PCP to get meds adjusted. Reviewed PmHx, RxHx, FmHx, SocHx, AllgHx and updated and dated in the chart. Review of Systems - negative except as listed above in the HPI    Objective: There were no vitals filed for this visit. Current Outpatient Medications   Medication Sig    chlorthalidone (HYGROTON) 25 mg tablet Take 1 Tablet by mouth daily.  clonazePAM (KlonoPIN) 2 mg tablet Take 1 Tablet by mouth daily as needed (anxiety).  albuterol (PROVENTIL HFA, VENTOLIN HFA, PROAIR HFA) 90 mcg/actuation inhaler Take 2 Puffs by inhalation every four (4) hours as needed for Wheezing.  cholecalciferol (Vitamin D3) (1000 Units /25 mcg) tablet Take  by mouth daily.  levothyroxine (SYNTHROID) 125 mcg tablet Take 1 Tablet by mouth Daily (before breakfast).  ferrous sulfate 325 mg (65 mg iron) tablet Take 1 Tablet by mouth two (2) times a day.  metFORMIN ER (GLUCOPHAGE XR) 500 mg tablet Take 1 Tablet by mouth daily (with dinner).  amLODIPine (NORVASC) 5 mg tablet Take 1 Tablet by mouth daily. No current facility-administered medications for this visit. Physical Examination: General appearance - alert, well appearing, and in no distress  Mental status - alert, oriented to person, place, and time      Assessment/ Plan:   Diagnoses and all orders for this visit:    1. Essential hypertension with goal blood pressure less than 140/90  -     chlorthalidone (HYGROTON) 25 mg tablet; Take 1 Tablet by mouth daily.     Continue with amlodipine start chlorthalidone 25 mg once daily  Follow-up and Dispositions    · Return in about 3 weeks (around 1/12/2022), or if symptoms worsen or fail to improve. I have discussed the diagnosis with the patient and the intended plan as seen in the above orders. The patient has received an after-visit summary and questions were answered concerning future plans. Pt conveyed understanding of plan. Medication Side Effects and Warnings were discussed with patient      Eli Gaxiola is being evaluated by a Virtual Visit (video visit) encounter to address concerns as mentioned above. A caregiver was present when appropriate. Due to this being a TeleHealth encounter (During [facility] public health emergency), evaluation of the following organ systems was limited: Vitals/Constitutional/EENT/Resp/CV/GI//MS/Neuro/Skin/Heme-Lymph-Imm. Pursuant to the emergency declaration under the [address] [place] [address], [address] [place] [address] authority and the [employer] Act, this Virtual Visit was conducted with patient's (and/or legal guardian's) consent, to reduce the patient's risk of exposure to COVID-19 and provide necessary medical care. The patient (and/or legal guardian) has also been advised to contact this office for worsening conditions or problems, and seek emergency medical treatment and/or call 911 if deemed necessary. Patient identification was verified at the start of the visit: Yes    Services were provided through a video synchronous discussion virtually to substitute for in-person clinic visit. Patient and provider were located at their individual homes.   --Caitlyn Quintero DO on 12/22/2021 at 2:30 PM

## 2021-12-22 NOTE — PATIENT INSTRUCTIONS
A Healthy Lifestyle: Care Instructions  Your Care Instructions     A healthy lifestyle can help you feel good, stay at a healthy weight, and have plenty of energy for both work and play. A healthy lifestyle is something you can share with your whole family. A healthy lifestyle also can lower your risk for serious health problems, such as high blood pressure, heart disease, and diabetes. You can follow a few steps listed below to improve your health and the health of your family. Follow-up care is a key part of your treatment and safety. Be sure to make and go to all appointments, and call your doctor if you are having problems. It's also a good idea to know your test results and keep a list of the medicines you take. How can you care for yourself at home? · Do not eat too much sugar, fat, or fast foods. You can still have dessert and treats now and then. The goal is moderation. · Start small to improve your eating habits. Pay attention to portion sizes, drink less juice and soda pop, and eat more fruits and vegetables. ? Eat a healthy amount of food. A 3-ounce serving of meat, for example, is about the size of a deck of cards. Fill the rest of your plate with vegetables and whole grains. ? Limit the amount of soda and sports drinks you have every day. Drink more water when you are thirsty. ? Eat plenty of fruits and vegetables every day. Have an apple or some carrot sticks as an afternoon snack instead of a candy bar. Try to have fruits and/or vegetables at every meal.  · Make exercise part of your daily routine. You may want to start with simple activities, such as walking, bicycling, or slow swimming. Try to be active 30 to 60 minutes every day. You do not need to do all 30 to 60 minutes all at once. For example, you can exercise 3 times a day for 10 or 20 minutes.  Moderate exercise is safe for most people, but it is always a good idea to talk to your doctor before starting an exercise program.  · Keep moving. Anusha Deems the lawn, work in the garden, or [employer]. Take the stairs instead of the elevator at work. · If you smoke, quit. People who smoke have an increased risk for heart attack, stroke, cancer, and other lung illnesses. Quitting is hard, but there are ways to boost your chance of quitting tobacco for good. ? Use nicotine gum, patches, or lozenges. ? Ask your doctor about stop-smoking programs and medicines. ? Keep trying. In addition to reducing your risk of diseases in the future, you will notice some benefits soon after you stop using tobacco. If you have shortness of breath or asthma symptoms, they will likely get better within a few weeks after you quit. · Limit how much alcohol you drink. Moderate amounts of alcohol (up to 2 drinks a day for men, 1 drink a day for women) are okay. But drinking too much can lead to liver problems, high blood pressure, and other health problems. Family health  If you have a family, there are many things you can do together to improve your health. · Eat meals together as a family as often as possible. · Eat healthy foods. This includes fruits, vegetables, lean meats and dairy, and whole grains. · Include your family in your fitness plan. Most people think of activities such as jogging or tennis as the way to fitness, but there are many ways you and your family can be more active. Anything that makes you breathe hard and gets your heart pumping is exercise. Here are some tips:  ? Walk to do errands or to take your child to school or the bus.  ? Go for a family bike ride after dinner instead of watching TV. Where can you learn more? Go to http://www.gray.com/  Enter Y253 in the search box to learn more about \"A Healthy Lifestyle: Care Instructions. \"  Current as of: June 16, 2021               Content Version: 13.0  © 5627-2736 [employer]wise, Incorporated.    Care instructions adapted under license by [employer] (which disclaims liability or warranty for this information). If you have questions about a medical condition or this instruction, always ask your healthcare professional. [address] any warranty or liability for your use of this information.

## 2021-12-28 ENCOUNTER — TELEPHONE (OUTPATIENT)
Dept: ONCOLOGY | Age: 41
End: 2021-12-28

## 2022-01-06 ENCOUNTER — TELEPHONE (OUTPATIENT)
Dept: FAMILY MEDICINE CLINIC | Age: 42
End: 2022-01-06

## 2022-01-06 NOTE — LETTER
1/7/2022 6:45 AM    Ms. Lawrence Eamon Andrade  [address] [facility] [address] [id number]    Jorge Green is currently under the care of Selma Daniel. Below is a list of her weights from [id number] for your review.     WT / BMI WEIGHT BODY MASS INDEX   12/16/2021 348 lb 1.7 oz 54.52 kg/m2   12/9/2021 357 lb 55.91 kg/m2   10/25/2021 338 lb 52.94 kg/m2   9/1/2021 340 lb 51.7 kg/m2   8/26/2021 340 lb 51.7 kg/m2   7/26/2021 341 lb 51.85 kg/m2   5/1/2020 320 lb 1.7 oz 48.67 kg/m2   1/1/2020 319 lb 10.7 oz 48.61 kg/m2       Sincerely,      Peggy Barlow NP

## 2022-01-06 NOTE — ED PROVIDER NOTES
Patient is a 44 y.o. female presenting with abdominal pain and back pain. Abdominal Pain    Associated symptoms include diarrhea, nausea, frequency and back pain. Pertinent negatives include no fever, no vomiting, no constipation, no dysuria, no hematuria and no headaches. Back Pain    Associated symptoms include abdominal pain. Pertinent negatives include no fever, no numbness, no headaches, no dysuria and no weakness. 45 yo AAF presents with b/l lower abdominal pain onset yesterday, crampy, 5/10, radiating to back. Hx of similar pain with bladder infection. C/o urinary frequency. +nausea, no vomiting. Denies fever, chills, constipation. +diarrhea, no bloody or black stools, onset yesterday, occurring x2 today, loose, not watery. Denies bowel/bladder incontinence or saddle anesthesia, weakness or numbness in legs. Past Medical History:   Diagnosis Date    Anxiety     Asthma     Hypertension        Past Surgical History:   Procedure Laterality Date    HX CHOLECYSTECTOMY      HX CYST REMOVAL      HX TUBAL LIGATION           Family History:   Problem Relation Age of Onset    Hypertension Mother     Hypertension Sister     Cancer Maternal Aunt        Social History     Social History    Marital status: SINGLE     Spouse name: N/A    Number of children: N/A    Years of education: N/A     Occupational History    Not on file. Social History Main Topics    Smoking status: Former Smoker    Smokeless tobacco: Never Used    Alcohol use No    Drug use: No    Sexual activity: Yes     Partners: Male     Birth control/ protection: Condom     Other Topics Concern    Not on file     Social History Narrative         ALLERGIES: Bactrim [sulfamethoprim ds]    Review of Systems   Constitutional: Negative for chills and fever. Respiratory: Negative for cough and shortness of breath. Gastrointestinal: Positive for abdominal pain, diarrhea and nausea.  Negative for blood in stool, constipation and vomiting. Genitourinary: Positive for frequency. Negative for dysuria, hematuria, vaginal bleeding and vaginal discharge. Musculoskeletal: Positive for back pain. Skin: Negative for rash. Neurological: Negative for weakness, numbness and headaches. All other systems reviewed and are negative. Vitals:    06/10/17 1920   BP: (!) 174/118   Pulse: (!) 112   Resp: 16   Temp: 98.1 °F (36.7 °C)   SpO2: 98%   Weight: 148 kg (326 lb 4.5 oz)   Height: 5' 7\" (1.702 m)            Physical Exam   Physical Examination: General appearance - alert, well appearing, and in no distress, oriented to person, place, and time and normal appearing weight  Eyes - pupils equal and reactive, extraocular eye movements intact  Neck - supple, no significant adenopathy  Chest - clear to auscultation, no wheezes, rales or rhonchi, symmetric air entry  Heart - normal rate, regular rhythm, normal S1, S2, no murmurs, rubs, clicks or gallops  Abdomen - soft, nontender, nondistended, no masses or organomegaly  Back exam - full range of motion, no tenderness, palpable spasm or pain on motion  Neurological - alert, oriented, normal speech, no focal findings or movement disorder noted  Musculoskeletal - no joint tenderness, deformity or swelling  Extremities - peripheral pulses normal, no pedal edema, no clubbing or cyanosis  Skin - normal coloration and turgor, no rashes, no suspicious skin lesions noted  MDM  Number of Diagnoses or Management Options     Amount and/or Complexity of Data Reviewed  Clinical lab tests: ordered and reviewed  Decide to obtain previous medical records or to obtain history from someone other than the patient: yes  Review and summarize past medical records: yes    Patient Progress  Patient progress: stable    ED Course       Procedures  Pt afebrile, nontoxic, abdomen soft, nontender on exam. States similar symptoms in the past with UTI. Denies vaginal discharge. Back pain is similar to her chronic back pain.  Will tx for UTI given 4+ bacteria in urine. Will send culture. Pt to return to ED if symptoms worsening over the next 24 hours. Will tx UTI with augmentin given hx of diverticuli on previous CT scan. Pt due for her BP med this evening, will take when she gets home. Denies cp/sob. Xolair Pregnancy And Lactation Text: This medication is Pregnancy Category B and is considered safe during pregnancy. This medication is excreted in breast milk.

## 2022-01-06 NOTE — TELEPHONE ENCOUNTER
Pt called in and is wondering if you would be able to type up a letter with pts weight history for 2020 and 2021. She needs it for her biatric dr. Almonte Broad she can print it off of [employer]. Call back number for her in case is [phone]. Thanks.  ia

## 2022-01-07 ENCOUNTER — TELEPHONE (OUTPATIENT)
Dept: ONCOLOGY | Age: 42
End: 2022-01-07

## 2022-01-07 NOTE — TELEPHONE ENCOUNTER
[address] at [place]  [phone]        01/07/22 3:53 PM Called patient and advised of Dr. Armstrong Commander response. Patient voiced understanding but inquires if Dr. Kristal Alicea can submit anything saying it is okay for patient to have surgery. Discussed that Dr. Kristal Alicea does not write medical clearance letters for the diagnosis she is seeing patient for. Advised that surgeon's office contact this office if they have any anemia-related concerns. Patient voiced understanding.

## 2022-01-07 NOTE — TELEPHONE ENCOUNTER
Patient called and requested a call back to discuss if she can have a letter for clearance for patient to have bariatric surgery. Patient has surgery towards the end of February. Patient also asked can she have this letter now or does she need to wait till after her infusions are done.  Please advise       AUGUSTINE# [phone]

## 2022-01-08 ENCOUNTER — HOSPITAL ENCOUNTER (OUTPATIENT)
Dept: INFUSION THERAPY | Age: 42
End: 2022-01-08

## 2022-01-08 ENCOUNTER — APPOINTMENT (OUTPATIENT)
Dept: INFUSION THERAPY | Age: 42
End: 2022-01-08

## 2022-01-10 ENCOUNTER — TELEPHONE (OUTPATIENT)
Dept: ONCOLOGY | Age: 42
End: 2022-01-10

## 2022-01-13 DIAGNOSIS — I10 ESSENTIAL HYPERTENSION WITH GOAL BLOOD PRESSURE LESS THAN 140/90: ICD-10-CM

## 2022-01-13 RX ORDER — CHLORTHALIDONE 25 MG/1
TABLET ORAL
Qty: 30 TABLET | Refills: 1 | Status: SHIPPED | OUTPATIENT
Start: 2022-01-13 | End: 2022-02-09

## 2022-01-14 ENCOUNTER — HOSPITAL ENCOUNTER (OUTPATIENT)
Dept: INFUSION THERAPY | Age: 42
Discharge: HOME | End: 2022-01-14
Payer: COMMERCIAL

## 2022-01-14 VITALS
HEIGHT: 67 IN | SYSTOLIC BLOOD PRESSURE: 128 MMHG | HEART RATE: 84 BPM | BODY MASS INDEX: 45.99 KG/M2 | RESPIRATION RATE: 16 BRPM | WEIGHT: 293 LBS | OXYGEN SATURATION: 97 % | TEMPERATURE: 98 F | DIASTOLIC BLOOD PRESSURE: 56 MMHG

## 2022-01-14 DIAGNOSIS — D50.0 IRON DEFICIENCY ANEMIA DUE TO CHRONIC BLOOD LOSS: Primary | ICD-10-CM

## 2022-01-14 PROCEDURE — 74011250636 HC RX REV CODE- 250/636: Performed by: NURSE PRACTITIONER

## 2022-01-14 PROCEDURE — 74011000258 HC RX REV CODE- 258: Performed by: NURSE PRACTITIONER

## 2022-01-14 PROCEDURE — 96365 THER/PROPH/DIAG IV INF INIT: CPT

## 2022-01-14 RX ORDER — SODIUM CHLORIDE 9 MG/ML
10 INJECTION INTRAMUSCULAR; INTRAVENOUS; SUBCUTANEOUS AS NEEDED
Status: ACTIVE | OUTPATIENT
Start: 2022-01-14 | End: 2022-01-14

## 2022-01-14 RX ORDER — SODIUM CHLORIDE 0.9 % (FLUSH) 0.9 %
10 SYRINGE (ML) INJECTION AS NEEDED
Status: DISPENSED | OUTPATIENT
Start: 2022-01-14 | End: 2022-01-14

## 2022-01-14 RX ORDER — HEPARIN 100 UNIT/ML
300-500 SYRINGE INTRAVENOUS AS NEEDED
Status: ACTIVE | OUTPATIENT
Start: 2022-01-14 | End: 2022-01-14

## 2022-01-14 RX ORDER — SODIUM CHLORIDE 9 MG/ML
25 INJECTION, SOLUTION INTRAVENOUS CONTINUOUS
Status: DISPENSED | OUTPATIENT
Start: 2022-01-14 | End: 2022-01-14

## 2022-01-14 RX ADMIN — SODIUM CHLORIDE 25 ML/HR: 9 INJECTION, SOLUTION INTRAVENOUS at 08:26

## 2022-01-14 RX ADMIN — IRON SUCROSE 200 MG: 20 INJECTION, SOLUTION INTRAVENOUS at 08:50

## 2022-01-14 NOTE — PROGRESS NOTES
[facility] Progress Note    Date: 2022    Name: Janice Habermann    MRN: [id number]         : 1980    Ms. Gilford Sievert Arrived ambulatory and in no distress for Dose 1 of 5  For Venofer. Assessment was completed, no acute issues at this time, no new complaints voiced. 24 G PIV established to R arm without difficulty. Ms. Johanna Rodriguez vitals were reviewed. Visit Vitals  BP (!) 128/56   Pulse 84   Temp 98 °F (36.7 °C)   Resp 16   Ht 5' 7\" (1.702 m)   Wt 152.8 kg (336 lb 12.8 oz)   SpO2 97%   Breastfeeding No   BMI 52.75 kg/m²       Medications:  Medications Administered     0.9% sodium chloride infusion     Admin Date  2022 Action  New Bag Dose  25 mL/hr Rate  25 mL/hr Route  IntraVENous Administered By  Natividad Underwood RN          iron sucrose (VENOFER) 200 mg in 0.9% sodium chloride 100 mL, overfill volume 10 mL IVPB     Admin Date  2022 Action  New Bag Dose  200 mg Rate  120 mL/hr Route  IntraVENous Administered By  Natividad Underwood, ADELAIDA                    Ms. Gilford Sievert tolerated treatment well and was discharged from [place] [address] in stable condition . PIV flushed & removed. Discharge instructions reviewed with patient, verbalized understanding. Patient politely declined to stay 30 minutes post infusion for monitoring. She is to return on   at 0800 for her next appointment.     Linsey Neal RN  2022

## 2022-01-15 ENCOUNTER — HOSPITAL ENCOUNTER (OUTPATIENT)
Dept: INFUSION THERAPY | Age: 42
End: 2022-01-15

## 2022-01-15 ENCOUNTER — APPOINTMENT (OUTPATIENT)
Dept: INFUSION THERAPY | Age: 42
End: 2022-01-15

## 2022-01-17 ENCOUNTER — HOSPITAL ENCOUNTER (OUTPATIENT)
Dept: INFUSION THERAPY | Age: 42
End: 2022-01-17

## 2022-01-19 ENCOUNTER — HOSPITAL ENCOUNTER (OUTPATIENT)
Dept: INFUSION THERAPY | Age: 42
Discharge: HOME | End: 2022-01-19
Payer: COMMERCIAL

## 2022-01-19 VITALS
OXYGEN SATURATION: 98 % | HEART RATE: 84 BPM | DIASTOLIC BLOOD PRESSURE: 83 MMHG | SYSTOLIC BLOOD PRESSURE: 135 MMHG | TEMPERATURE: 97.1 F

## 2022-01-19 DIAGNOSIS — D50.0 IRON DEFICIENCY ANEMIA DUE TO CHRONIC BLOOD LOSS: Primary | ICD-10-CM

## 2022-01-19 PROCEDURE — 74011250636 HC RX REV CODE- 250/636: Performed by: NURSE PRACTITIONER

## 2022-01-19 PROCEDURE — 74011000258 HC RX REV CODE- 258: Performed by: NURSE PRACTITIONER

## 2022-01-19 PROCEDURE — 96365 THER/PROPH/DIAG IV INF INIT: CPT

## 2022-01-19 RX ORDER — SODIUM CHLORIDE 9 MG/ML
25 INJECTION, SOLUTION INTRAVENOUS CONTINUOUS
Status: DISPENSED | OUTPATIENT
Start: 2022-01-19 | End: 2022-01-19

## 2022-01-19 RX ADMIN — IRON SUCROSE 200 MG: 20 INJECTION, SOLUTION INTRAVENOUS at 09:05

## 2022-01-19 RX ADMIN — SODIUM CHLORIDE 25 ML/HR: 9 INJECTION, SOLUTION INTRAVENOUS at 09:02

## 2022-01-19 NOTE — PROGRESS NOTES
[facility] Progress Note        Date: 2022    Name: Meredith Jackson    MRN: [id number]         : 1980    0820  Ms. Craig Cook Arrived ambulatory and in no distress for  C1D8 of Venofer Regimen. Assessment was completed, patient stated that she had mild stomach cramping with last infusion. RN advised patient to eat before each infusion to help minimize cramping. 22G PIV placed in right arm without difficulty. 1. Do you have any symptoms of COVID-19? SOB, coughing, fever, or generally not feeling well NO    2. Have you been exposed to COVID-19 recently? NO    3. Have you had any recent contact with family/friend that has a pending COVID test? NO           Ms. Luca Melendez vitals were reviewed. Patient Vitals for the past 12 hrs:   Temp Pulse BP SpO2   22 1022  84 135/83    22 0820 97.1 °F (36.2 °C) 98 134/85 98 %         Lab results were obtained and reviewed. No results found for this or any previous visit (from the past 12 hour(s)). Medications received:  Medications Administered     0.9% sodium chloride infusion     Admin Date  2022 Action  New Bag Dose  25 mL/hr Rate  25 mL/hr Route  IntraVENous Administered By  Anjum Brown RN          iron sucrose (VENOFER) 200 mg in 0.9% sodium chloride 100 mL, overfill volume 10 mL IVPB     Admin Date  2022 Action  New Bag Dose  200 mg Rate  120 mL/hr Route  IntraVENous Administered By  Anjum Brown RN                 Ms. Craig Cook tolerated treatment well and was discharged from [place] [address] in stable condition at 1025. She is to return on  2022 at 0730 for her next appointment.     Eddi Harvey RN  2022    Future Appointments:  Future Appointments   Date Time Provider Kecia Wagner   2022  7:30 AM SS INF2 CH4 <2H [facility]   2022  7:30 AM [facility]   2022 10:00 AM SS INF3 CH4 <2H [facility] [address] [place] [address]

## 2022-01-21 ENCOUNTER — HOSPITAL ENCOUNTER (OUTPATIENT)
Dept: INFUSION THERAPY | Age: 42
Discharge: HOME | End: 2022-01-21

## 2022-01-22 ENCOUNTER — APPOINTMENT (OUTPATIENT)
Dept: INFUSION THERAPY | Age: 42
End: 2022-01-22

## 2022-01-24 ENCOUNTER — HOSPITAL ENCOUNTER (OUTPATIENT)
Dept: INFUSION THERAPY | Age: 42
Discharge: HOME | End: 2022-01-24

## 2022-01-25 ENCOUNTER — HOSPITAL ENCOUNTER (OUTPATIENT)
Dept: INFUSION THERAPY | Age: 42
Discharge: HOME | End: 2022-01-25
Payer: COMMERCIAL

## 2022-01-25 VITALS
TEMPERATURE: 98.1 F | OXYGEN SATURATION: 93 % | SYSTOLIC BLOOD PRESSURE: 135 MMHG | DIASTOLIC BLOOD PRESSURE: 70 MMHG | HEART RATE: 101 BPM | RESPIRATION RATE: 18 BRPM

## 2022-01-25 DIAGNOSIS — D50.0 IRON DEFICIENCY ANEMIA DUE TO CHRONIC BLOOD LOSS: Primary | ICD-10-CM

## 2022-01-25 PROCEDURE — 74011000258 HC RX REV CODE- 258: Performed by: NURSE PRACTITIONER

## 2022-01-25 PROCEDURE — 74011250636 HC RX REV CODE- 250/636: Performed by: NURSE PRACTITIONER

## 2022-01-25 PROCEDURE — 96365 THER/PROPH/DIAG IV INF INIT: CPT

## 2022-01-25 RX ORDER — SODIUM CHLORIDE 9 MG/ML
25 INJECTION, SOLUTION INTRAVENOUS CONTINUOUS
Status: DISCONTINUED | OUTPATIENT
Start: 2022-01-25 | End: 2022-01-26 | Stop reason: HOSPADM

## 2022-01-25 RX ADMIN — SODIUM CHLORIDE 25 ML/HR: 900 INJECTION, SOLUTION INTRAVENOUS at 15:00

## 2022-01-25 RX ADMIN — IRON SUCROSE 200 MG: 20 INJECTION, SOLUTION INTRAVENOUS at 15:07

## 2022-01-25 NOTE — PROGRESS NOTES
[facility] Progress Note    Date: 2022    Name: Eileen Kiran    MRN: [id number]         : 1980    Ms. Sharol Lombard Arrived ambulatory and in no distress for Venofer Infusion. Assessment was completed, no acute issues at this time, no new complaints voiced. 24 G PIV established to right arm, + blood return. Ms. Ulises Negro vitals were reviewed. Visit Vitals  /70   Pulse (!) 101   Temp 98.1 °F (36.7 °C)   Resp 18   SpO2 93%     Medications:  Medications Administered     0.9% sodium chloride infusion     Admin Date  2022 Action  New Bag Dose  25 mL/hr Rate  25 mL/hr Route  IntraVENous Administered By  Shadi Aviles RN          iron sucrose (VENOFER) 200 mg in 0.9% sodium chloride 100 mL, overfill volume 10 mL IVPB     Admin Date  2022 Action  New Bag Dose  200 mg Rate  120 mL/hr Route  IntraVENous Administered By  Shadi Aviles, ADELAIDA                Ms. Sharol Lombard tolerated treatment well and was discharged from [place] [address] in stable condition. PIV flushed & removed. She is aware of future appointments.     Future Appointments   Date Time Provider Kecia Wagner   2022  2:30 PM SS INF1 CH4 <2H [facility]   2022  3:00 PM [facility] ADELAIDA  2022

## 2022-01-26 ENCOUNTER — HOSPITAL ENCOUNTER (OUTPATIENT)
Dept: INFUSION THERAPY | Age: 42
End: 2022-01-26

## 2022-01-27 ENCOUNTER — HOSPITAL ENCOUNTER (OUTPATIENT)
Dept: INFUSION THERAPY | Age: 42
Discharge: HOME | End: 2022-01-27
Payer: COMMERCIAL

## 2022-01-27 VITALS
WEIGHT: 293 LBS | SYSTOLIC BLOOD PRESSURE: 123 MMHG | RESPIRATION RATE: 18 BRPM | HEART RATE: 98 BPM | DIASTOLIC BLOOD PRESSURE: 60 MMHG | TEMPERATURE: 96.8 F | HEIGHT: 67 IN | BODY MASS INDEX: 45.99 KG/M2 | OXYGEN SATURATION: 98 %

## 2022-01-27 DIAGNOSIS — D50.0 IRON DEFICIENCY ANEMIA DUE TO CHRONIC BLOOD LOSS: Primary | ICD-10-CM

## 2022-01-27 PROCEDURE — 74011250636 HC RX REV CODE- 250/636: Performed by: NURSE PRACTITIONER

## 2022-01-27 PROCEDURE — 96365 THER/PROPH/DIAG IV INF INIT: CPT

## 2022-01-27 PROCEDURE — 74011000258 HC RX REV CODE- 258: Performed by: NURSE PRACTITIONER

## 2022-01-27 RX ORDER — SODIUM CHLORIDE 9 MG/ML
25 INJECTION, SOLUTION INTRAVENOUS CONTINUOUS
Status: DISCONTINUED | OUTPATIENT
Start: 2022-01-27 | End: 2022-01-28 | Stop reason: HOSPADM

## 2022-01-27 RX ORDER — SODIUM CHLORIDE 0.9 % (FLUSH) 0.9 %
10 SYRINGE (ML) INJECTION AS NEEDED
Status: DISCONTINUED | OUTPATIENT
Start: 2022-01-27 | End: 2022-01-28 | Stop reason: HOSPADM

## 2022-01-27 RX ORDER — SODIUM CHLORIDE 9 MG/ML
10 INJECTION INTRAMUSCULAR; INTRAVENOUS; SUBCUTANEOUS AS NEEDED
Status: DISCONTINUED | OUTPATIENT
Start: 2022-01-27 | End: 2022-01-28 | Stop reason: HOSPADM

## 2022-01-27 RX ORDER — HEPARIN 100 UNIT/ML
300-500 SYRINGE INTRAVENOUS AS NEEDED
Status: DISCONTINUED | OUTPATIENT
Start: 2022-01-27 | End: 2022-01-28 | Stop reason: HOSPADM

## 2022-01-27 RX ADMIN — IRON SUCROSE 200 MG: 20 INJECTION, SOLUTION INTRAVENOUS at 15:14

## 2022-01-27 RX ADMIN — SODIUM CHLORIDE 25 ML/HR: 9 INJECTION, SOLUTION INTRAVENOUS at 15:13

## 2022-01-27 NOTE — PROGRESS NOTES
[facility]patient [facility]fusion Center Short Visit Progress Note     Patient admitted to [facility] for Venofer ambulatory in stable condition. Assessment completed. No new concerns voiced. Vital Signs:  Visit Vitals  /61   Pulse 98   Temp 96.8 °F (36 °C)   Resp 18   Ht 5' 7\" (1.702 m)   Wt 158.1 kg (348 lb 9.6 oz)   SpO2 96%   BMI 54.60 kg/m²         PIV left arm with positive blood return. Lab Results:  none        Medications:  Medications Administered     0.9% sodium chloride infusion     Admin Date  01/27/2022 Action  New Bag Dose  25 mL/hr Rate  25 mL/hr Route  IntraVENous Administered By  Hoang Bridges RN          iron sucrose (VENOFER) 200 mg in 0.9% sodium chloride 100 mL, overfill volume 10 mL IVPB     Admin Date  01/27/2022 Action  New Bag Dose  200 mg Rate  120 mL/hr Route  IntraVENous Administered By  Hoang Bridges, ADELAIDA                 Patient tolerated treatment well. Patient discharged from [place] [address] ambulatory in no distress. Patient aware of next appointment.     Future Appointments   Date Time Provider Kecia Wagner   1/31/2022  3:00 PM SS INF2 CH4 <2H [facility]

## 2022-01-29 ENCOUNTER — APPOINTMENT (OUTPATIENT)
Dept: INFUSION THERAPY | Age: 42
End: 2022-01-29

## 2022-01-29 DIAGNOSIS — E03.9 HYPOTHYROIDISM, UNSPECIFIED TYPE: ICD-10-CM

## 2022-01-30 RX ORDER — LEVOTHYROXINE SODIUM 125 UG/1
TABLET ORAL
Qty: 90 TABLET | Refills: 1 | Status: SHIPPED | OUTPATIENT
Start: 2022-01-30

## 2022-01-31 ENCOUNTER — HOSPITAL ENCOUNTER (OUTPATIENT)
Dept: INFUSION THERAPY | Age: 42
Discharge: HOME | End: 2022-01-31
Payer: COMMERCIAL

## 2022-01-31 VITALS
HEART RATE: 92 BPM | TEMPERATURE: 97.7 F | RESPIRATION RATE: 18 BRPM | SYSTOLIC BLOOD PRESSURE: 137 MMHG | DIASTOLIC BLOOD PRESSURE: 65 MMHG | OXYGEN SATURATION: 98 %

## 2022-01-31 DIAGNOSIS — D50.0 IRON DEFICIENCY ANEMIA DUE TO CHRONIC BLOOD LOSS: Primary | ICD-10-CM

## 2022-01-31 PROCEDURE — 74011250636 HC RX REV CODE- 250/636: Performed by: NURSE PRACTITIONER

## 2022-01-31 PROCEDURE — 74011000258 HC RX REV CODE- 258: Performed by: NURSE PRACTITIONER

## 2022-01-31 PROCEDURE — 96365 THER/PROPH/DIAG IV INF INIT: CPT

## 2022-01-31 RX ORDER — SODIUM CHLORIDE 9 MG/ML
10 INJECTION INTRAMUSCULAR; INTRAVENOUS; SUBCUTANEOUS AS NEEDED
Status: DISCONTINUED | OUTPATIENT
Start: 2022-01-31 | End: 2022-02-01 | Stop reason: HOSPADM

## 2022-01-31 RX ORDER — ONDANSETRON 2 MG/ML
8 INJECTION INTRAMUSCULAR; INTRAVENOUS AS NEEDED
Status: DISCONTINUED | OUTPATIENT
Start: 2022-01-31 | End: 2022-02-01 | Stop reason: HOSPADM

## 2022-01-31 RX ORDER — SODIUM CHLORIDE 9 MG/ML
25 INJECTION, SOLUTION INTRAVENOUS CONTINUOUS
Status: DISCONTINUED | OUTPATIENT
Start: 2022-01-31 | End: 2022-02-01 | Stop reason: HOSPADM

## 2022-01-31 RX ORDER — ACETAMINOPHEN 325 MG/1
650 TABLET ORAL AS NEEDED
Status: DISCONTINUED | OUTPATIENT
Start: 2022-01-31 | End: 2022-02-01 | Stop reason: HOSPADM

## 2022-01-31 RX ORDER — DIPHENHYDRAMINE HYDROCHLORIDE 50 MG/ML
25 INJECTION, SOLUTION INTRAMUSCULAR; INTRAVENOUS AS NEEDED
Status: DISCONTINUED | OUTPATIENT
Start: 2022-01-31 | End: 2022-02-01 | Stop reason: HOSPADM

## 2022-01-31 RX ORDER — HEPARIN 100 UNIT/ML
300-500 SYRINGE INTRAVENOUS AS NEEDED
Status: DISCONTINUED | OUTPATIENT
Start: 2022-01-31 | End: 2022-02-01 | Stop reason: HOSPADM

## 2022-01-31 RX ORDER — SODIUM CHLORIDE 0.9 % (FLUSH) 0.9 %
10 SYRINGE (ML) INJECTION AS NEEDED
Status: DISCONTINUED | OUTPATIENT
Start: 2022-01-31 | End: 2022-02-01 | Stop reason: HOSPADM

## 2022-01-31 RX ADMIN — SODIUM CHLORIDE 25 ML/HR: 9 INJECTION, SOLUTION INTRAVENOUS at 15:26

## 2022-01-31 RX ADMIN — IRON SUCROSE 200 MG: 20 INJECTION, SOLUTION INTRAVENOUS at 15:37

## 2022-01-31 NOTE — PROGRESS NOTES
[facility] Progress Note    Date: 2022    Name: Greg Vyas    MRN: [id number]         : 1980    Ms. Chandana Sage Arrived ambulatory and in no distress for Dose 5 of 5 of Venofer. Assessment was completed, no acute issues at this time, no new complaints voiced. 24 G PIV established to R arm without difficulty. Ms. Aylin Maria vitals were reviewed. Patient Vitals for the past 12 hrs:   Temp Pulse Resp BP SpO2   22 1519 97.7 °F (36.5 °C) 92 18 (!) 156/80 100 %       Medications:    Medications Administered     0.9% sodium chloride infusion     Admin Date  2022 Action  New Bag Dose  25 mL/hr Rate  25 mL/hr Route  IntraVENous Administered By  Beto Gibson RN          iron sucrose (VENOFER) 200 mg in 0.9% sodium chloride 100 mL, overfill volume 10 mL IVPB     Admin Date  2022 Action  New Bag Dose  200 mg Rate  120 mL/hr Route  IntraVENous Administered By  Beto Gibson RN                  Ms. Chandana Sage tolerated treatment well and was discharged from [place] [address] in stable condition . PIV flushed & removed. Discharge instructions reviewed with patient, verbalized understanding. Patient politely declined to stay 30 minutes post infusion for monitoring. No future apt needed at this time.     Gertrude Baer RN  2022

## 2022-02-03 ENCOUNTER — TELEPHONE (OUTPATIENT)
Dept: ONCOLOGY | Age: 42
End: 2022-02-03

## 2022-02-03 NOTE — TELEPHONE ENCOUNTER
[facility] [employer] at [facility]  [phone]        02/03/22 4:05 PM Called patient. Patient states employer is requesting a letter stating that patient was under Dr. Michael younger from 01/14/2022 through 01/31/2022. Patient received Venofer infusions on 01/14, 01/19, 01/25, 01/27, and 01/31 per chart review. Patient states she was able to arrive to work late after first two infusions, but took day off when she received each remaining iron infusion. Patient states she had experienced stomach cramping after receiving infusion. Patient attended work on days she did not receive Venofer. Advised this nurse would forward above request to Dr. Sherrill Castle. If approved, will send letter to patient via [employer] per her request. Explained that if any questions or concerns, then this nurse will call patient back. She voiced understanding. 02/04/22 11:27 AM Called patient and updated of message from NP. Letter drafted. Will upload to [employer] per patient request once signed.

## 2022-02-03 NOTE — TELEPHONE ENCOUNTER
Patient called and stated that needs a doctors note/ letter stating that she was under Dr. Nadiya younger from 1/14/22 to 1/37/2022. Patient advised me she turned in her FMLA paperwork and her employer stated that it was not filled out correctly. so patient will need a not/letter of care.  Please advise       # [phone]

## 2022-02-05 ENCOUNTER — APPOINTMENT (OUTPATIENT)
Dept: INFUSION THERAPY | Age: 42
End: 2022-02-05

## 2022-02-09 DIAGNOSIS — I10 ESSENTIAL HYPERTENSION WITH GOAL BLOOD PRESSURE LESS THAN 140/90: ICD-10-CM

## 2022-02-09 RX ORDER — CHLORTHALIDONE 25 MG/1
TABLET ORAL
Qty: 30 TABLET | Refills: 1 | Status: SHIPPED | OUTPATIENT
Start: 2022-02-09 | End: 2022-03-09

## 2022-02-15 DIAGNOSIS — F41.9 ANXIETY: ICD-10-CM

## 2022-02-15 RX ORDER — CLONAZEPAM 2 MG/1
TABLET ORAL
Qty: 30 TABLET | Refills: 0 | Status: SHIPPED | OUTPATIENT
Start: 2022-02-17 | End: 2022-03-14 | Stop reason: SDUPTHER

## 2022-03-09 DIAGNOSIS — I10 ESSENTIAL HYPERTENSION WITH GOAL BLOOD PRESSURE LESS THAN 140/90: ICD-10-CM

## 2022-03-09 RX ORDER — CHLORTHALIDONE 25 MG/1
TABLET ORAL
Qty: 30 TABLET | Refills: 1 | Status: SHIPPED | OUTPATIENT
Start: 2022-03-09 | End: 2022-04-05

## 2022-03-18 PROBLEM — E03.9 HYPOTHYROIDISM: Status: ACTIVE | Noted: 2021-08-28

## 2022-03-19 PROBLEM — D50.0 IRON DEFICIENCY ANEMIA DUE TO CHRONIC BLOOD LOSS: Status: ACTIVE | Noted: 2021-12-13

## 2022-03-19 PROBLEM — E66.01 OBESITY, MORBID (HCC): Status: ACTIVE | Noted: 2018-03-01

## 2022-03-19 PROBLEM — R73.03 PREDIABETES: Status: ACTIVE | Noted: 2017-01-06

## 2022-03-24 ENCOUNTER — APPOINTMENT (OUTPATIENT)
Dept: GENERAL RADIOLOGY | Age: 42
End: 2022-03-24
Attending: EMERGENCY MEDICINE
Payer: COMMERCIAL

## 2022-03-24 ENCOUNTER — HOSPITAL ENCOUNTER (EMERGENCY)
Age: 42
Discharge: HOME | End: 2022-03-24
Attending: EMERGENCY MEDICINE
Payer: COMMERCIAL

## 2022-03-24 VITALS
HEART RATE: 98 BPM | RESPIRATION RATE: 20 BRPM | HEIGHT: 67 IN | DIASTOLIC BLOOD PRESSURE: 78 MMHG | BODY MASS INDEX: 45.99 KG/M2 | WEIGHT: 293 LBS | TEMPERATURE: 97.1 F | OXYGEN SATURATION: 95 % | SYSTOLIC BLOOD PRESSURE: 166 MMHG

## 2022-03-24 DIAGNOSIS — M79.601 RIGHT ARM PAIN: Primary | ICD-10-CM

## 2022-03-24 PROCEDURE — 99283 EMERGENCY DEPT VISIT LOW MDM: CPT

## 2022-03-24 PROCEDURE — 73030 X-RAY EXAM OF SHOULDER: CPT

## 2022-03-24 RX ORDER — TRAMADOL HYDROCHLORIDE 50 MG/1
50 TABLET ORAL
COMMUNITY

## 2022-03-25 DIAGNOSIS — J45.20 MILD INTERMITTENT ASTHMA WITHOUT COMPLICATION: ICD-10-CM

## 2022-03-25 NOTE — DISCHARGE INSTRUCTIONS
You presented to ED with 3 days of anterior right arm pain at the shoulder. You x-ray shows no signs of fracture or dislocation. Most likely this is a muscle skeletal injury to include the tendons and muscles. I recommend taking Aleve/naproxen 220 mg twice a day for the next 5 days. Additionally you may apply heat to the arm as needed. If supportive garments you feel better you could purchase a sling from the pharmacy. Additionally may trial muscle relaxers you already have at home as these may help at night. You may also take Tylenol up to 4000 mg a day for pain relief. If pain is not improving follow-up with your PCP for possible physical therapy referral.  If physical therapy does not resolve your pain you may talk to your primary care doctor about a possible referral to orthopedic surgery.

## 2022-03-25 NOTE — ED PROVIDER NOTES
Patient is a 79-year-old female presenting to the ED with right anterior shoulder and arm pain that is been present for approximately 3 days. Patient denies any trauma. Patient also is concerned she may have a DVT in this arm based on [employer] research. Patient did have an IV in the lower part of her right arm about a month ago for iron infusion but has had no problems since then. No history of blood clotting disorders. PERC negative. No erythema or cord at palpation of the site. The history is provided by the patient and the spouse. Arm Pain   This is a new problem. The current episode started more than 2 days ago. The problem occurs constantly. The problem has not changed since onset. The pain is present in the right arm. The quality of the pain is described as aching. The pain is at a severity of 5/10. The pain is moderate. Pertinent negatives include no numbness and full range of motion. The symptoms are aggravated by palpation and movement. She has tried nothing for the symptoms. The treatment provided no relief. There has been no history of extremity trauma. Past Medical History:   Diagnosis Date    Anxiety     Asthma     Hypertension     Rotator cuff tear     Seasonal allergic rhinitis        Past Surgical History:   Procedure Laterality Date    HX CHOLECYSTECTOMY      HX CYST REMOVAL      HX ORTHOPAEDIC      ankle surgery    HX TUBAL LIGATION           Family History:   Problem Relation Age of Onset    Hypertension Mother     Hypertension Sister     Cancer Maternal Aunt     Hypertension Brother        Social History     Socioeconomic History    Marital status: SINGLE     Spouse name: Not on file    Number of children: Not on file    Years of education: Not on file    Highest education level: Not on file   Occupational History    Not on file   Tobacco Use    Smoking status: Former Smoker    Smokeless tobacco: Never Used   Substance and Sexual Activity    Alcohol use:  No Alcohol/week: 0.0 standard drinks    Drug use: No    Sexual activity: Yes     Partners: Male     Birth control/protection: Condom   Other Topics Concern    Not on file   Social History Narrative    Not on file     Social Determinants of Health     Financial Resource Strain:     Difficulty of Paying Living Expenses: Not on file   Food Insecurity:     Worried About Running Out of Food in the Last Year: Not on file    Robles of Food in the Last Year: Not on file   Transportation Needs:     Lack of Transportation (Medical): Not on file    Lack of Transportation (Non-Medical): Not on file   Physical Activity:     Days of Exercise per Week: Not on file    Minutes of Exercise per Session: Not on file   Stress:     Feeling of Stress : Not on file   Social Connections:     Frequency of Communication with Friends and Family: Not on file    Frequency of Social Gatherings with Friends and Family: Not on file    Attends Sabianist Services: Not on file    Active Member of [address] [place] [address] or Organizations: Not on file    Attends Club or Organization Meetings: Not on file    Marital Status: Not on file   Intimate Partner Violence:     Fear of Current or Ex-Partner: Not on file    Emotionally Abused: Not on file    Physically Abused: Not on file    Sexually Abused: Not on file   Housing Stability:     Unable to Pay for Housing in the Last Year: Not on file    Number of [place] in the Last Year: Not on file    Unstable Housing in the Last Year: Not on file         ALLERGIES: Bactrim [sulfamethoprim ds] and Tramadol    Review of Systems   Constitutional: Negative. HENT: Negative. Eyes: Negative. Respiratory: Negative. Cardiovascular: Negative. Gastrointestinal: Negative. Endocrine: Negative. Genitourinary: Negative. Musculoskeletal: Positive for arthralgias. Skin: Negative. Allergic/Immunologic: Negative. Neurological: Negative. Negative for numbness. Hematological: Negative. Psychiatric/Behavioral: Negative. Vitals:    03/24/22 2141 03/24/22 2156 03/24/22 2226 03/24/22 2234   BP:    (!) 166/78   Pulse:       Resp:       Temp:       SpO2: 95% 95% 95%    Weight: 155.4 kg (342 lb 9.5 oz)      Height: 5' 7\" (1.702 m)               Physical Exam  Vitals and nursing note reviewed. Constitutional:       General: She is not in acute distress. Appearance: Normal appearance. HENT:      Head: Normocephalic and atraumatic. Right Ear: External ear normal.      Left Ear: External ear normal.      Nose: Nose normal.   Eyes:      Extraocular Movements: Extraocular movements intact. Conjunctiva/sclera: Conjunctivae normal.   Cardiovascular:      Rate and Rhythm: Normal rate. Pulses: Normal pulses. Radial pulses are 2+ on the right side and 2+ on the left side. Heart sounds: Normal heart sounds. Pulmonary:      Effort: Pulmonary effort is normal.      Breath sounds: Normal breath sounds. Chest:      Chest wall: No deformity or tenderness. Abdominal:      General: Abdomen is flat. There is no distension. Tenderness: There is no abdominal tenderness. Musculoskeletal:         General: Tenderness present. No swelling, deformity or signs of injury. Normal range of motion. Cervical back: Normal range of motion and neck supple. No tenderness. Skin:     General: Skin is warm and dry. Capillary Refill: Capillary refill takes less than 2 seconds. Findings: No erythema. Neurological:      General: No focal deficit present. Mental Status: She is alert and oriented to person, place, and time. Psychiatric:         Attention and Perception: Attention normal.         Mood and Affect: Mood normal.         Behavior: Behavior normal.          MDM       IMAGING RESULTS:  XR SHOULDER RT AP/LAT MIN 2 V   Final Result   No acute abnormality.           MEDICATIONS GIVEN:  Medications - No data to display    Differential diagnosis: Shoulder dislocation, humerus fracture, muscle skeletal injury, frozen shoulder, biceps tendon injury    ED physician interpretation of imaging: X-ray shows no signs of fracture or dislocation. MDM: Patient is a 50-year-old female presenting to the ED with right frontal arm pain at the shoulder. X-ray unremarkable. Doubt fracture or dislocation. Patient did have questions about blood clots. Patient has had no recent long term IVs.,  Last IV iron transfusion was 1 month ago. Physical exam not consistent with thrombophlebitis. Patient has no significant risk factors for blood clots and is PERC negative. No history of blood clots. Patient vital signs are stable, patient is afebrile. Key Discharge Instructions and summary of care: You presented to ED with 3 days of anterior right arm pain at the shoulder. You x-ray shows no signs of fracture or dislocation. Most likely this is a muscle skeletal injury to include the tendons and muscles. I recommend taking Aleve/naproxen 220 mg twice a day for the next 5 days. Additionally you may apply heat to the arm as needed. If supportive garments you feel better you could purchase a sling from the pharmacy. Additionally may trial muscle relaxers you already have at home as these may help at night. You may also take Tylenol up to 4000 mg a day for pain relief. If pain is not improving follow-up with your PCP for possible physical therapy referral.  If physical therapy does not resolve your pain you may talk to your primary care doctor about a possible referral to orthopedic surgery. ED Impression:    ICD-10-CM ICD-9-CM    1. Right arm pain  M79.601 729.5         DISPOSITION: Discharged    rTamaine Shelby MD          Procedures

## 2022-03-25 NOTE — ED TRIAGE NOTES
Patient arrived ambulatory to treatment area with c/o right arm pain x 2 days. Denies any known trauma or injury. States she has history of a torn rotator cuff and she had an injection once but states it feels worse today. Has tried tramadol today without relief.

## 2022-03-28 RX ORDER — ALBUTEROL SULFATE 90 UG/1
2 AEROSOL, METERED RESPIRATORY (INHALATION)
Qty: 1 EACH | Refills: 5 | Status: SHIPPED | OUTPATIENT
Start: 2022-03-28 | End: 2022-08-31

## 2022-04-05 ENCOUNTER — VIRTUAL VISIT (OUTPATIENT)
Dept: FAMILY MEDICINE CLINIC | Age: 42
End: 2022-04-05
Payer: COMMERCIAL

## 2022-04-05 DIAGNOSIS — F41.9 ANXIETY: ICD-10-CM

## 2022-04-05 DIAGNOSIS — I10 ESSENTIAL HYPERTENSION WITH GOAL BLOOD PRESSURE LESS THAN 140/90: Primary | ICD-10-CM

## 2022-04-05 DIAGNOSIS — R73.03 PREDIABETES: ICD-10-CM

## 2022-04-05 DIAGNOSIS — E66.01 MORBID OBESITY WITH BMI OF 50.0-59.9, ADULT (HCC): ICD-10-CM

## 2022-04-05 DIAGNOSIS — E03.9 HYPOTHYROIDISM, UNSPECIFIED TYPE: ICD-10-CM

## 2022-04-05 DIAGNOSIS — I10 ESSENTIAL HYPERTENSION WITH GOAL BLOOD PRESSURE LESS THAN 140/90: ICD-10-CM

## 2022-04-05 PROCEDURE — 99214 OFFICE O/P EST MOD 30 MIN: CPT | Performed by: FAMILY MEDICINE

## 2022-04-05 RX ORDER — CHLORTHALIDONE 25 MG/1
TABLET ORAL
Qty: 30 TABLET | Refills: 1 | Status: SHIPPED | OUTPATIENT
Start: 2022-04-05 | End: 2022-04-21 | Stop reason: SDUPTHER

## 2022-04-05 RX ORDER — CLONAZEPAM 2 MG/1
TABLET ORAL
Qty: 30 TABLET | Refills: 2 | Status: SHIPPED | OUTPATIENT
Start: 2022-04-05 | End: 2022-06-30 | Stop reason: SDUPTHER

## 2022-04-05 NOTE — PROGRESS NOTES
Progress Note    she is a 36y.o. year old female who presents for evalution. Subjective:     Patient requesting refill of clonazepam she is not due yet but she does not want to wait till she is completely out. Recently refilled on the 14th per . Medication works well for her anxiety no issues with sedation per patient. Currently on levothyroxine 125 mcg daily she is due for a recheck. She is doing well with this. Hypertension I had added on chlorthalidone and she is on amlodipine. She reports her blood pressure 120s over 80s has been checking regularly now. Patient is having gastric sleeve surgery on May 2. She is prediabetic previous hemoglobin A1c had gone up from 5.7-6.0. Reviewed PmHx, RxHx, FmHx, SocHx, AllgHx and updated and dated in the chart. Review of Systems - negative except as listed above in the HPI    Objective: There were no vitals filed for this visit. Current Outpatient Medications   Medication Sig    clonazePAM (KlonoPIN) 2 mg tablet TAKE 1 TABLET BY MOUTH DAILY AS NEEDED FOR ANXIETY    albuterol (PROVENTIL HFA, VENTOLIN HFA, PROAIR HFA) 90 mcg/actuation inhaler Take 2 Puffs by inhalation every four (4) hours as needed for Wheezing.  traMADoL (ULTRAM) 50 mg tablet Take 50 mg by mouth every six (6) hours as needed for Pain.  chlorthalidone (HYGROTON) 25 mg tablet TAKE 1 TABLET BY MOUTH EVERY DAY    levothyroxine (SYNTHROID) 125 mcg tablet TAKE 1 TABLET BY MOUTH EVERY DAY BEFORE BREAKFAST    ferrous sulfate 325 mg (65 mg iron) tablet Take 1 Tablet by mouth two (2) times a day. (Patient not taking: Reported on 3/24/2022)    amLODIPine (NORVASC) 5 mg tablet Take 1 Tablet by mouth daily. No current facility-administered medications for this visit.        Physical Examination: General appearance - alert, well appearing, and in no distress  Mental status - alert, oriented to person, place, and time      Assessment/ Plan:   Diagnoses and all orders for this visit:    1. Essential hypertension with goal blood pressure less than 797/61  -     METABOLIC PANEL, BASIC; Future    2. Anxiety  -     clonazePAM (KlonoPIN) 2 mg tablet; TAKE 1 TABLET BY MOUTH DAILY AS NEEDED FOR ANXIETY    3. Hypothyroidism, unspecified type  -     TSH 3RD GENERATION; Future  -     T4, FREE; Future    4. Prediabetes  -     HEMOGLOBIN A1C WITH EAG; Future    5. Morbid obesity with BMI of 50.0-59.9, adult [facility])  Undergoing gastric sleeve surgery on May 2     Follow-up and Dispositions    · Return in about 3 months (around 7/5/2022), or if symptoms worsen or fail to improve. I have discussed the diagnosis with the patient and the intended plan as seen in the above orders. The patient has received an after-visit summary and questions were answered concerning future plans. Pt conveyed understanding of plan. Medication Side Effects and Warnings were discussed with patient      Alean Apley is being evaluated by a Virtual Visit (video visit) encounter to address concerns as mentioned above. A caregiver was present when appropriate. Due to this being a TeleHealth encounter (During GYJJU-29 public health emergency), evaluation of the following organ systems was limited: Vitals/Constitutional/EENT/Resp/CV/GI//MS/Neuro/Skin/Heme-Lymph-Imm. Pursuant to the emergency declaration under the [address] [place] [address], [address] [place] [address] authority and the [employer] and [employer] Act, this Virtual Visit was conducted with patient's (and/or legal guardian's) consent, to reduce the patient's risk of exposure to COVID-19 and provide necessary medical care. The patient (and/or legal guardian) has also been advised to contact this office for worsening conditions or problems, and seek emergency medical treatment and/or call 911 if deemed necessary.     Patient identification was verified at the start of the visit: Yes    Services were provided through a video synchronous discussion virtually to substitute for in-person clinic visit. Patient and provider were located at their individual homes.   --Dina Bonner DO on 4/5/2022 at 9:24 AM

## 2022-04-05 NOTE — PATIENT INSTRUCTIONS
A Healthy Lifestyle: Care Instructions  Your Care Instructions     A healthy lifestyle can help you feel good, stay at a healthy weight, and have plenty of energy for both work and play. A healthy lifestyle is something you can share with your whole family. A healthy lifestyle also can lower your risk for serious health problems, such as high blood pressure, heart disease, and diabetes. You can follow a few steps listed below to improve your health and the health of your family. Follow-up care is a key part of your treatment and safety. Be sure to make and go to all appointments, and call your doctor if you are having problems. It's also a good idea to know your test results and keep a list of the medicines you take. How can you care for yourself at home? · Do not eat too much sugar, fat, or fast foods. You can still have dessert and treats now and then. The goal is moderation. · Start small to improve your eating habits. Pay attention to portion sizes, drink less juice and soda pop, and eat more fruits and vegetables. ? Eat a healthy amount of food. A 3-ounce serving of meat, for example, is about the size of a deck of cards. Fill the rest of your plate with vegetables and whole grains. ? Limit the amount of soda and sports drinks you have every day. Drink more water when you are thirsty. ? Eat plenty of fruits and vegetables every day. Have an apple or some carrot sticks as an afternoon snack instead of a candy bar. Try to have fruits and/or vegetables at every meal.  · Make exercise part of your daily routine. You may want to start with simple activities, such as walking, bicycling, or slow swimming. Try to be active 30 to 60 minutes every day. You do not need to do all 30 to 60 minutes all at once. For example, you can exercise 3 times a day for 10 or 20 minutes.  Moderate exercise is safe for most people, but it is always a good idea to talk to your doctor before starting an exercise program.  · Keep moving. Lydia Espanais the lawn, work in the garden, or [employer]. Take the stairs instead of the elevator at work. · If you smoke, quit. People who smoke have an increased risk for heart attack, stroke, cancer, and other lung illnesses. Quitting is hard, but there are ways to boost your chance of quitting tobacco for good. ? Use nicotine gum, patches, or lozenges. ? Ask your doctor about stop-smoking programs and medicines. ? Keep trying. In addition to reducing your risk of diseases in the future, you will notice some benefits soon after you stop using tobacco. If you have shortness of breath or asthma symptoms, they will likely get better within a few weeks after you quit. · Limit how much alcohol you drink. Moderate amounts of alcohol (up to 2 drinks a day for men, 1 drink a day for women) are okay. But drinking too much can lead to liver problems, high blood pressure, and other health problems. Family health  If you have a family, there are many things you can do together to improve your health. · Eat meals together as a family as often as possible. · Eat healthy foods. This includes fruits, vegetables, lean meats and dairy, and whole grains. · Include your family in your fitness plan. Most people think of activities such as jogging or tennis as the way to fitness, but there are many ways you and your family can be more active. Anything that makes you breathe hard and gets your heart pumping is exercise. Here are some tips:  ? Walk to do errands or to take your child to school or the bus.  ? Go for a family bike ride after dinner instead of watching TV. Where can you learn more? Go to http://www.gray.com/  Enter W773 in the search box to learn more about \"A Healthy Lifestyle: Care Instructions. \"  Current as of: June 16, 2021               Content Version: 13.2  © 4411-9904 Healthwise, [employer].    Care instructions adapted under license by [employer] (which disclaims liability or warranty for this information). If you have questions about a medical condition or this instruction, always ask your healthcare professional. [address] any warranty or liability for your use of this information.

## 2022-04-18 ENCOUNTER — DOCUMENTATION ONLY (OUTPATIENT)
Dept: FAMILY MEDICINE CLINIC | Age: 42
End: 2022-04-18

## 2022-04-18 NOTE — PROGRESS NOTES
CYRUS HYLTON [facility] chest xray report was put on SONALI Tucker's desk (covering paperwork for Dr Tonja Shetty)

## 2022-04-21 DIAGNOSIS — I10 ESSENTIAL HYPERTENSION WITH GOAL BLOOD PRESSURE LESS THAN 140/90: ICD-10-CM

## 2022-04-21 RX ORDER — CHLORTHALIDONE 25 MG/1
25 TABLET ORAL DAILY
Qty: 30 TABLET | Refills: 1 | Status: SHIPPED | OUTPATIENT
Start: 2022-04-21 | End: 2022-05-03

## 2022-04-21 RX ORDER — AMLODIPINE BESYLATE 5 MG/1
5 TABLET ORAL DAILY
Qty: 90 TABLET | Refills: 1 | Status: SHIPPED | OUTPATIENT
Start: 2022-04-21

## 2022-05-02 DIAGNOSIS — I10 ESSENTIAL HYPERTENSION WITH GOAL BLOOD PRESSURE LESS THAN 140/90: ICD-10-CM

## 2022-05-03 ENCOUNTER — DOCUMENTATION ONLY (OUTPATIENT)
Dept: FAMILY MEDICINE CLINIC | Age: 42
End: 2022-05-03

## 2022-05-03 RX ORDER — CHLORTHALIDONE 25 MG/1
TABLET ORAL
Qty: 30 TABLET | Refills: 1 | Status: SHIPPED | OUTPATIENT
Start: 2022-05-03

## 2022-05-17 DIAGNOSIS — L30.9 ECZEMA, UNSPECIFIED TYPE: ICD-10-CM

## 2022-05-17 RX ORDER — LEVOCETIRIZINE DIHYDROCHLORIDE 5 MG/1
TABLET, FILM COATED ORAL
Qty: 90 TABLET | Refills: 1 | Status: SHIPPED | OUTPATIENT
Start: 2022-05-17

## 2022-06-30 DIAGNOSIS — F41.9 ANXIETY: ICD-10-CM

## 2022-07-05 RX ORDER — CLONAZEPAM 2 MG/1
TABLET ORAL
Qty: 30 TABLET | Refills: 0 | Status: SHIPPED | OUTPATIENT
Start: 2022-07-05 | End: 2022-08-02 | Stop reason: SDUPTHER

## 2022-07-05 RX ORDER — NALOXONE HYDROCHLORIDE 4 MG/.1ML
SPRAY NASAL
Qty: 1 EACH | Refills: 11 | Status: SHIPPED | OUTPATIENT
Start: 2022-07-05

## 2022-07-05 NOTE — TELEPHONE ENCOUNTER
Make sure she has a prescription for naloxone from her pain management office. I will also send this in now.   30-day sent she will need an appointment for any further refills of Klonopin

## 2022-07-05 NOTE — TELEPHONE ENCOUNTER
Called and spoke with patient. She states that she does already have Narcan that she got from pain management. I advised she can disregard that RX when picking up the Klonopin. Also advised that she is due to be seen for further refills. Patient verbalized understanding.  Apt scheduled for 8/2/22

## 2022-08-02 ENCOUNTER — TELEPHONE (OUTPATIENT)
Dept: FAMILY MEDICINE CLINIC | Age: 42
End: 2022-08-02

## 2022-08-02 ENCOUNTER — VIRTUAL VISIT (OUTPATIENT)
Dept: FAMILY MEDICINE CLINIC | Age: 42
End: 2022-08-02
Payer: COMMERCIAL

## 2022-08-02 DIAGNOSIS — F41.9 ANXIETY: ICD-10-CM

## 2022-08-02 PROCEDURE — 99213 OFFICE O/P EST LOW 20 MIN: CPT | Performed by: FAMILY MEDICINE

## 2022-08-02 RX ORDER — CLONAZEPAM 2 MG/1
TABLET ORAL
Qty: 30 TABLET | Refills: 2 | Status: SHIPPED | OUTPATIENT
Start: 2022-08-02 | End: 2022-10-24 | Stop reason: SDUPTHER

## 2022-08-02 NOTE — TELEPHONE ENCOUNTER
Called and spoke with patient. This is for the labs ordered in April.  Lab apt scheduled for 8/11/22

## 2022-08-02 NOTE — PATIENT INSTRUCTIONS
A Healthy Lifestyle: Care Instructions  Your Care Instructions     A healthy lifestyle can help you feel good, stay at a healthy weight, and have plenty of energy for both work and play. A healthy lifestyle is something you can share with your whole family. A healthy lifestyle also can lower your risk for serious health problems, such as high blood pressure, heart disease, and diabetes. You can follow a few steps listed below to improve your health and the health of your family. Follow-up care is a key part of your treatment and safety. Be sure to make and go to all appointments, and call your doctor if you are having problems. It's also a good idea to know your test results and keep a list of the medicines you take. How can you care for yourself at home? Do not eat too much sugar, fat, or fast foods. You can still have dessert and treats now and then. The goal is moderation. Start small to improve your eating habits. Pay attention to portion sizes, drink less juice and soda pop, and eat more fruits and vegetables. Eat a healthy amount of food. A 3-ounce serving of meat, for example, is about the size of a deck of cards. Fill the rest of your plate with vegetables and whole grains. Limit the amount of soda and sports drinks you have every day. Drink more water when you are thirsty. Eat plenty of fruits and vegetables every day. Have an apple or some carrot sticks as an afternoon snack instead of a candy bar. Try to have fruits and/or vegetables at every meal.  Make exercise part of your daily routine. You may want to start with simple activities, such as walking, bicycling, or slow swimming. Try to be active 30 to 60 minutes every day. You do not need to do all 30 to 60 minutes all at once. For example, you can exercise 3 times a day for 10 or 20 minutes. Moderate exercise is safe for most people, but it is always a good idea to talk to your doctor before starting an exercise program.  Keep moving.  Anusha Cowart the lawn, work in the garden, or clean your house. Take the stairs instead of the elevator at work. If you smoke, quit. People who smoke have an increased risk for heart attack, stroke, cancer, and other lung illnesses. Quitting is hard, but there are ways to boost your chance of quitting tobacco for good. Use nicotine gum, patches, or lozenges. Ask your doctor about stop-smoking programs and medicines. Keep trying. In addition to reducing your risk of diseases in the future, you will notice some benefits soon after you stop using tobacco. If you have shortness of breath or asthma symptoms, they will likely get better within a few weeks after you quit. Limit how much alcohol you drink. Moderate amounts of alcohol (up to 2 drinks a day for men, 1 drink a day for women) are okay. But drinking too much can lead to liver problems, high blood pressure, and other health problems. Family health  If you have a family, there are many things you can do together to improve your health. Eat meals together as a family as often as possible. Eat healthy foods. This includes fruits, vegetables, lean meats and dairy, and whole grains. Include your family in your fitness plan. Most people think of activities such as jogging or tennis as the way to fitness, but there are many ways you and your family can be more active. Anything that makes you breathe hard and gets your heart pumping is exercise. Here are some tips:  Walk to do errands or to take your child to school or the bus. Go for a family bike ride after dinner instead of watching TV. Where can you learn more? Go to http://www.gray.com/  Enter E385 in the search box to learn more about \"A Healthy Lifestyle: Care Instructions. \"  Current as of: June 16, 2021               Content Version: 13.2  © 9905-5748 Healthwise[employer].    Care instructions adapted under license by [employer] (which disclaims liability or warranty for this information). If you have questions about a medical condition or this instruction, always ask your healthcare professional. [place] [address] any warranty or liability for your use of this information.

## 2022-08-02 NOTE — TELEPHONE ENCOUNTER
Patient had a VV with provider this morning and wanted to schedule her labs. There are labs from April and not from today's visit. Please call patient and discuss.  [phone]

## 2022-08-02 NOTE — PROGRESS NOTES
Progress Note    she is a 36y.o. year old female who presents for evalution. Subjective:     Patient here for refill of Klonopin use for anxiety. No history of abuse or misuse. Not having any issues with this. She is on tramadol on occasion from another doctor she does have naloxone which I sent in for her. She does not take the 2 medications together.  checked and appropriate. Reviewed PmHx, RxHx, FmHx, SocHx, AllgHx and updated and dated in the chart. Review of Systems - negative except as listed above in the HPI    Objective: There were no vitals filed for this visit. Current Outpatient Medications   Medication Sig    clonazePAM (KlonoPIN) 2 mg tablet TAKE 1 TABLET BY MOUTH DAILY AS NEEDED FOR ANXIETY    naloxone (NARCAN) 4 mg/actuation nasal spray Use 1 spray intranasally, then discard. Repeat with new spray every 2 min as needed for opioid overdose symptoms, alternating nostrils. levocetirizine (XYZAL) 5 mg tablet TAKE 1 TABLET BY MOUTH EVERY DAY    chlorthalidone (HYGROTON) 25 mg tablet TAKE 1 TABLET BY MOUTH EVERY DAY    amLODIPine (NORVASC) 5 mg tablet Take 1 Tablet by mouth daily. albuterol (PROVENTIL HFA, VENTOLIN HFA, PROAIR HFA) 90 mcg/actuation inhaler Take 2 Puffs by inhalation every four (4) hours as needed for Wheezing. traMADoL (ULTRAM) 50 mg tablet Take 50 mg by mouth every six (6) hours as needed for Pain.    levothyroxine (SYNTHROID) 125 mcg tablet TAKE 1 TABLET BY MOUTH EVERY DAY BEFORE BREAKFAST    ferrous sulfate 325 mg (65 mg iron) tablet Take 1 Tablet by mouth two (2) times a day. (Patient not taking: Reported on 3/24/2022)     No current facility-administered medications for this visit. Physical Examination: General appearance - alert, well appearing, and in no distress  Mental status - alert, oriented to person, place, and time      Assessment/ Plan:   Diagnoses and all orders for this visit:    1.  Anxiety  -     clonazePAM (KlonoPIN) 2 mg tablet; TAKE 1 TABLET BY MOUTH DAILY AS NEEDED FOR ANXIETY  Patient has been reminded to please get the labs that I ordered back in April. Follow-up and Dispositions    Return in about 3 months (around 11/2/2022), or if symptoms worsen or fail to improve. I have discussed the diagnosis with the patient and the intended plan as seen in the above orders. The patient has received an after-visit summary and questions were answered concerning future plans. Pt conveyed understanding of plan. Medication Side Effects and Warnings were discussed with patient      Nicholas Gunn is being evaluated by a Virtual Visit (video visit) encounter to address concerns as mentioned above. A caregiver was present when appropriate. Due to this being a TeleHealth encounter (During JZIAG-05 public health emergency), evaluation of the following organ systems was limited: Vitals/Constitutional/EENT/Resp/CV/GI//MS/Neuro/Skin/Heme-Lymph-Imm. Pursuant to the emergency declaration under the [address] [place] [address] authority and the [employer] Act, this Virtual Visit was conducted with patient's (and/or legal guardian's) consent, to reduce the patient's risk of exposure to COVID-19 and provide necessary medical care. The patient (and/or legal guardian) has also been advised to contact this office for worsening conditions or problems, and seek emergency medical treatment and/or call 911 if deemed necessary. Patient identification was verified at the start of the visit: Yes    Services were provided through a video synchronous discussion virtually to substitute for in-person clinic visit. Patient and provider were located at their individual homes.   --[address] [place] [address], DO on 8/2/2022 at 9:59 AM

## 2022-08-22 ENCOUNTER — TRANSCRIBE ORDER (OUTPATIENT)
Dept: SCHEDULING | Age: 42
End: 2022-08-22

## 2022-08-22 DIAGNOSIS — M51.26 OTHER INTERVERTEBRAL DISC DISPLACEMENT, LUMBAR REGION: Primary | ICD-10-CM

## 2022-08-30 DIAGNOSIS — J45.20 MILD INTERMITTENT ASTHMA WITHOUT COMPLICATION: ICD-10-CM

## 2022-08-31 RX ORDER — ALBUTEROL SULFATE 90 UG/1
AEROSOL, METERED RESPIRATORY (INHALATION)
Qty: 6.7 EACH | Refills: 5 | Status: SHIPPED | OUTPATIENT
Start: 2022-08-31

## 2022-09-23 ENCOUNTER — HOSPITAL ENCOUNTER (OUTPATIENT)
Dept: MRI IMAGING | Age: 42
Discharge: HOME | End: 2022-09-23
Attending: NURSE PRACTITIONER
Payer: COMMERCIAL

## 2022-09-23 DIAGNOSIS — M51.26 OTHER INTERVERTEBRAL DISC DISPLACEMENT, LUMBAR REGION: ICD-10-CM

## 2022-09-23 PROCEDURE — 72148 MRI LUMBAR SPINE W/O DYE: CPT

## 2022-10-24 DIAGNOSIS — F41.9 ANXIETY: ICD-10-CM

## 2022-10-24 RX ORDER — CLONAZEPAM 2 MG/1
TABLET ORAL
Qty: 30 TABLET | Refills: 0 | Status: SHIPPED | OUTPATIENT
Start: 2022-10-24 | End: 2022-11-22 | Stop reason: SDUPTHER

## 2022-10-25 NOTE — TELEPHONE ENCOUNTER
LVM for patient of 1 month supply sent to pharmacy and that she is due to be seen next month for her next set of refills

## 2022-11-10 ENCOUNTER — DOCUMENTATION ONLY (OUTPATIENT)
Dept: FAMILY MEDICINE CLINIC | Age: 42
End: 2022-11-10

## 2022-11-11 DIAGNOSIS — J45.20 MILD INTERMITTENT ASTHMA WITHOUT COMPLICATION: ICD-10-CM

## 2022-11-14 RX ORDER — ALBUTEROL SULFATE 90 UG/1
2 AEROSOL, METERED RESPIRATORY (INHALATION)
Qty: 6.7 EACH | Refills: 5 | Status: SHIPPED | OUTPATIENT
Start: 2022-11-14

## 2022-11-22 ENCOUNTER — VIRTUAL VISIT (OUTPATIENT)
Dept: FAMILY MEDICINE CLINIC | Age: 42
End: 2022-11-22
Payer: COMMERCIAL

## 2022-11-22 DIAGNOSIS — F41.9 ANXIETY: ICD-10-CM

## 2022-11-22 PROCEDURE — 99212 OFFICE O/P EST SF 10 MIN: CPT | Performed by: NURSE PRACTITIONER

## 2022-11-22 RX ORDER — CLONAZEPAM 2 MG/1
TABLET ORAL
Qty: 30 TABLET | Refills: 2 | Status: SHIPPED | OUTPATIENT
Start: 2022-11-22

## 2022-11-22 RX ORDER — NORETHINDRONE 0.35 MG/1
TABLET ORAL
COMMUNITY
Start: 2022-10-20

## 2022-11-22 NOTE — PROGRESS NOTES
Chief Complaint   Patient presents with    Anxiety    Insomnia    Medication Evaluation       1. Patient vv appt today for med check. Klonopin works well for sleep hygiene and anxiety. Have no concerns.

## 2022-11-22 NOTE — PROGRESS NOTES
Ke Rios is a 36 y.o. female who was seen by synchronous (real-time) audio-video technology on 11/22/2022 for Anxiety, Insomnia, and Medication Evaluation        Assessment & Plan:   Diagnoses and all orders for this visit:    1. Anxiety  -     clonazePAM (KlonoPIN) 2 mg tablet; TAKE 1 TABLET BY MOUTH DAILY AS NEEDED FOR ANXIETY  Refilled rx. Stable on med as prescribed. Request pt follow up in office for next med refill and she verbalized understanding of this. I spent at least 10 minutes on this visit with this established patient. 712  Subjective:     Chief Complaint   Patient presents with    Anxiety    Insomnia    Medication Evaluation     Patient vv appt today for med check. Klonopin works well for sleep hygiene and anxiety. Taking at night for insomnia mostly, sometimes during the day for acute anxiety attacks. Denies any SEs of medication. Medication last refilled on 10/24. Has not had in person visit since August 2021. Prior to Admission medications    Medication Sig Start Date End Date Taking? Authorizing Provider    [place] 0.35 mg tab TAKE 1 TABLET BY MOUTH EVERY DAY FOR 90 DAYS 10/20/22  Yes Provider, Historical   albuterol (PROVENTIL HFA, VENTOLIN HFA, PROAIR HFA) 90 mcg/actuation inhaler Take 2 Puffs by inhalation every four (4) hours as needed for Wheezing. 11/14/22  Yes Perlita Monson MD   clonazePAM (KlonoPIN) 2 mg tablet TAKE 1 TABLET BY MOUTH DAILY AS NEEDED FOR ANXIETY 10/24/22  Yes Luciana Palafox,    naloxone (NARCAN) 4 mg/actuation nasal spray Use 1 spray intranasally, then discard. Repeat with new spray every 2 min as needed for opioid overdose symptoms, alternating nostrils. 7/5/22  Yes Allie Palafox DO   levocetirizine (XYZAL) 5 mg tablet TAKE 1 TABLET BY MOUTH EVERY DAY 5/17/22  Yes Perlita Monson MD   amLODIPine (NORVASC) 5 mg tablet Take 1 Tablet by mouth daily.  4/21/22  Yes Allie Palafox DO   traMADoL (ULTRAM) 50 mg tablet Take 50 mg by mouth every six (6) hours as needed for Pain. Yes Other, MD Tha   chlorthalidone (HYGROTON) 25 mg tablet TAKE 1 TABLET BY MOUTH EVERY DAY  Patient not taking: Reported on 11/22/2022 5/3/22 11/22/22  Radha Du DO   levothyroxine (SYNTHROID) 125 mcg tablet TAKE 1 TABLET BY MOUTH EVERY DAY BEFORE BREAKFAST  Patient not taking: Reported on 11/22/2022 1/30/22 11/22/22  Aidan Ward NP   ferrous sulfate 325 mg (65 mg iron) tablet Take 1 Tablet by mouth two (2) times a day. Patient not taking: No sig reported 7/29/21 11/22/22  Aidan Ward NP     Patient Active Problem List    Diagnosis Date Noted    Iron deficiency anemia due to chronic blood loss 12/13/2021    Hypothyroidism 08/28/2021    Obesity, morbid ([facility] [address] 03/01/2018    Prediabetes 01/06/2017    Essential hypertension with goal blood pressure less than 140/90 07/27/2016    Asthma, mild intermittent 03/22/2016    Anxiety 10/14/2015     Current Outpatient Medications   Medication Sig Dispense Refill    [place]assia 0.35 mg tab TAKE 1 TABLET BY MOUTH EVERY DAY FOR 90 DAYS      clonazePAM (KlonoPIN) 2 mg tablet TAKE 1 TABLET BY MOUTH DAILY AS NEEDED FOR ANXIETY 30 Tablet 2    albuterol (PROVENTIL HFA, VENTOLIN HFA, PROAIR HFA) 90 mcg/actuation inhaler Take 2 Puffs by inhalation every four (4) hours as needed for Wheezing. 6.7 Each 5    naloxone (NARCAN) 4 mg/actuation nasal spray Use 1 spray intranasally, then discard. Repeat with new spray every 2 min as needed for opioid overdose symptoms, alternating nostrils. 1 Each 11    levocetirizine (XYZAL) 5 mg tablet TAKE 1 TABLET BY MOUTH EVERY DAY 90 Tablet 1    amLODIPine (NORVASC) 5 mg tablet Take 1 Tablet by mouth daily. 90 Tablet 1    traMADoL (ULTRAM) 50 mg tablet Take 50 mg by mouth every six (6) hours as needed for Pain.        Allergies   Allergen Reactions    Bactrim [Sulfamethoprim Ds] Hives    Tramadol Hives     Patient states she is not allergic to Tramadol       ROS    Objective:     Patient-Reported Vitals 4/5/2022 Patient-Reported Weight -   Patient-Reported Height -   Patient-Reported Systolic  275   Patient-Reported Diastolic 82   Patient-Reported LMP -      General: alert, cooperative, no distress   Mental  status: normal mood, behavior, speech, dress, motor activity, and thought processes, able to follow commands   HENT: NCAT   Neck: no visualized mass   Resp: no respiratory distress   Neuro: no gross deficits   Skin: no discoloration or lesions of concern on visible areas   Psychiatric: normal affect, consistent with stated mood, no evidence of hallucinations     Additional exam findings: We discussed the expected course, resolution and complications of the diagnosis(es) in detail. Medication risks, benefits, costs, interactions, and alternatives were discussed as indicated. I advised her to contact the office if her condition worsens, changes or fails to improve as anticipated. She expressed understanding with the diagnosis(es) and plan. David Patel, was evaluated through a synchronous (real-time) audio-video encounter. The patient (or guardian if applicable) is aware that this is a billable service, which includes applicable co-pays. This Virtual Visit was conducted with patient's (and/or legal guardian's) consent. The visit was conducted pursuant to the emergency declaration under the [address] [place] [address] authority and the [employer] Act. Patient identification was verified, and a caregiver was present when appropriate.   The patient was located at: Home: [address] [place] [address] Dr Jose Roca [id number]  The provider was located at: Home: [unfilled]        Adia Murphy NP

## 2023-01-29 DIAGNOSIS — I10 ESSENTIAL HYPERTENSION WITH GOAL BLOOD PRESSURE LESS THAN 140/90: ICD-10-CM

## 2023-01-31 RX ORDER — AMLODIPINE BESYLATE 5 MG/1
TABLET ORAL
Qty: 90 TABLET | Refills: 1 | Status: SHIPPED | OUTPATIENT
Start: 2023-01-31

## 2023-02-16 DIAGNOSIS — F41.9 ANXIETY: ICD-10-CM

## 2023-02-17 RX ORDER — CLONAZEPAM 2 MG/1
TABLET ORAL
Qty: 30 TABLET | Refills: 2 | Status: SHIPPED | OUTPATIENT
Start: 2023-02-17

## 2023-03-15 DIAGNOSIS — J45.20 MILD INTERMITTENT ASTHMA WITHOUT COMPLICATION: ICD-10-CM

## 2023-03-15 RX ORDER — ALBUTEROL SULFATE 90 UG/1
AEROSOL, METERED RESPIRATORY (INHALATION)
Qty: 6.7 EACH | Refills: 5 | Status: SHIPPED | OUTPATIENT
Start: 2023-03-15

## 2023-05-08 DIAGNOSIS — L30.9 DERMATITIS, UNSPECIFIED: ICD-10-CM

## 2023-05-09 RX ORDER — LEVOCETIRIZINE DIHYDROCHLORIDE 5 MG/1
TABLET, FILM COATED ORAL
Qty: 30 TABLET | Refills: 5 | Status: SHIPPED | OUTPATIENT
Start: 2023-05-09

## 2023-05-11 ENCOUNTER — TELEPHONE (OUTPATIENT)
Age: 43
End: 2023-05-11

## 2023-05-23 ENCOUNTER — TELEMEDICINE (OUTPATIENT)
Age: 43
End: 2023-05-23
Payer: MEDICAID

## 2023-05-23 DIAGNOSIS — F41.9 ANXIETY: Primary | ICD-10-CM

## 2023-05-23 DIAGNOSIS — F51.04 PSYCHOPHYSIOLOGICAL INSOMNIA: ICD-10-CM

## 2023-05-23 PROCEDURE — 99213 OFFICE O/P EST LOW 20 MIN: CPT | Performed by: FAMILY MEDICINE

## 2023-05-23 RX ORDER — CLONAZEPAM 2 MG/1
TABLET ORAL
Qty: 60 TABLET | Refills: 5 | Status: SHIPPED | OUTPATIENT
Start: 2023-05-23 | End: 2023-11-23

## 2023-05-23 ASSESSMENT — PATIENT HEALTH QUESTIONNAIRE - PHQ9
SUM OF ALL RESPONSES TO PHQ QUESTIONS 1-9: 0
1. LITTLE INTEREST OR PLEASURE IN DOING THINGS: 0
SUM OF ALL RESPONSES TO PHQ QUESTIONS 1-9: 0
2. FEELING DOWN, DEPRESSED OR HOPELESS: 0
SUM OF ALL RESPONSES TO PHQ9 QUESTIONS 1 & 2: 0

## 2023-05-23 NOTE — PATIENT INSTRUCTIONS
Patient Education        A Healthy Lifestyle: Care Instructions  A healthy lifestyle can help you feel good, have more energy, and stay at a weight that's healthy for you. You can share a healthy lifestyle with your friends and family. And you can do it on your own. Eat meals with your friends or family. You could try cooking together. Plan activities with other people. Go for a walk with a friend, try a free online fitness class, or join a sports league. Eat a variety of healthy foods. These include fruits, vegetables, whole grains, low-fat dairy, and lean protein. Choose healthy portions of food. You can use the Nutrition Facts label on food packages as a guide. Eat more fruits and vegetables. You could add vegetables to sandwiches or add fruit to cereal.   Drink water when you are thirsty. Limit soda, juice, and sports drinks. Try to exercise most days. Aim for at least 2½ hours of exercise each week. Keep moving. Work in the garden or take your dog on a walk. Use the stairs instead of the elevator. If you use tobacco or nicotine, try to quit. Ask your doctor about programs and medicines to help you quit. Limit alcohol. Men should have no more than 2 drinks a day. Women should have no more than 1. For some people, no alcohol is the best choice. Follow-up care is a key part of your treatment and safety. Be sure to make and go to all appointments, and call your doctor if you are having problems. It's also a good idea to know your test results and keep a list of the medicines you take. Where can you learn more? Go to http://www.diamond.com/ and enter U807 to learn more about \"A Healthy Lifestyle: Care Instructions. \"  Current as of: November 14, 2022               Content Version: 13.6  © 6998-1028 [employer]. Care instructions adapted under license by [address]  If you have questions about a medical condition or this instruction, always ask your

## 2023-05-23 NOTE — PROGRESS NOTES
Progress Note    she is a 39y.o. year old female who presents for evaluation. Subjective:     Patient here for refill of clonazepam 2 mg taken at night for anxiety and insomnia. Medication is working well for her without taking it she gets very anxious at night difficulty with sleeping otherwise she sleeps well with the medication. Denies any issues with side effects. She is no longer on any narcotics through pain management states that they are making her too loopy so she is using Tylenol as needed.  checked and appropriate. We discussed that she is very overdue for labs and that she is to set a follow-up appointment for in the office for her prediabetes etc.      Reviewed PmHx, RxHx, FmHx, SocHx, AllgHx and updated and dated in the chart. Review of Systems - negative except as listed above in the HPI    Objective: There were no vitals filed for this visit. Current Outpatient Medications   Medication Sig    clonazePAM (KLONOPIN) 2 MG tablet TAKE 1 TABLET BY MOUTH EVERY DAY AS NEEDED FOR ANXIETY    levocetirizine (XYZAL) 5 MG tablet TAKE 1 TABLET BY MOUTH EVERY DAY    albuterol sulfate HFA (PROVENTIL;VENTOLIN;PROAIR) 108 (90 Base) MCG/ACT inhaler Inhale 2 puffs into the lungs every 4 hours as needed    amLODIPine (NORVASC) 5 MG tablet Take 1 tablet by mouth daily    norethindrone (MICRONOR) 0.35 MG tablet TAKE 1 TABLET BY MOUTH EVERY DAY FOR 90 DAYS     No current facility-administered medications for this visit. Physical Examination: General appearance - alert, well appearing, and in no distress  Mental status - alert, oriented to person, place, and time      Assessment/ Plan:   Monica was seen today for medication refill.     Diagnoses and all orders for this visit:    Anxiety  -     clonazePAM (KLONOPIN) 2 MG tablet; TAKE 1 TABLET BY MOUTH EVERY DAY AS NEEDED FOR ANXIETY    Psychophysiological insomnia  -     clonazePAM (KLONOPIN) 2 MG tablet; TAKE 1 TABLET BY MOUTH EVERY DAY AS

## 2023-07-18 LAB — HBA1C MFR BLD HPLC: 5.4 %

## 2023-09-11 DIAGNOSIS — J45.20 MILD INTERMITTENT ASTHMA, UNCOMPLICATED: ICD-10-CM

## 2023-09-12 RX ORDER — ALBUTEROL SULFATE 90 UG/1
AEROSOL, METERED RESPIRATORY (INHALATION)
Qty: 6.7 EACH | Refills: 5 | Status: SHIPPED | OUTPATIENT
Start: 2023-09-12

## 2023-10-03 ENCOUNTER — TELEMEDICINE (OUTPATIENT)
Age: 43
End: 2023-10-03
Payer: COMMERCIAL

## 2023-10-03 DIAGNOSIS — G56.03 BILATERAL CARPAL TUNNEL SYNDROME: ICD-10-CM

## 2023-10-03 DIAGNOSIS — L30.9 ECZEMA, UNSPECIFIED TYPE: Primary | ICD-10-CM

## 2023-10-03 PROCEDURE — 99214 OFFICE O/P EST MOD 30 MIN: CPT | Performed by: FAMILY MEDICINE

## 2023-10-03 RX ORDER — TRIAMCINOLONE ACETONIDE 1 MG/G
OINTMENT TOPICAL 2 TIMES DAILY PRN
Qty: 30 G | Refills: 1 | Status: SHIPPED | OUTPATIENT
Start: 2023-10-03 | End: 2023-10-17

## 2023-10-03 NOTE — PROGRESS NOTES
Uses a just a couple times a week. Return if symptoms worsen or fail to improve. I have discussed the diagnosis with the patient and the intended plan as seen in the above orders. The patient has received an after-visit summary and questions were answered concerning future plans. Pt conveyed understanding of plan. Medication Side Effects and Warnings were discussed with patient      Xavier Chavez is being evaluated by a Virtual Visit (video visit) encounter to address concerns as mentioned above. A caregiver was present when appropriate. Due to this being a TeleHealth encounter (During [facility] public health emergency), evaluation of the following organ systems was limited: Vitals/Constitutional/EENT/Resp/CV/GI//MS/Neuro/Skin/Heme-Lymph-Imm. Pursuant to the emergency declaration under the [address] and the [employer] Act, this Virtual Visit was conducted with patient's (and/or legal guardian's) consent, to reduce the patient's risk of exposure to COVID-19 and provide necessary medical care. The patient (and/or legal guardian) has also been advised to contact this office for worsening conditions or problems, and seek emergency medical treatment and/or call 911 if deemed necessary. Patient identification was verified at the start of the visit: Yes    Services were provided through a video synchronous discussion virtually to substitute for in-person clinic visit. Patient and provider were located at their individual homes.   --Malinda Ramos DO on 10/3/2023 at 8:25 AM

## 2023-11-06 DIAGNOSIS — F41.9 ANXIETY: ICD-10-CM

## 2023-11-06 DIAGNOSIS — F51.04 PSYCHOPHYSIOLOGICAL INSOMNIA: ICD-10-CM

## 2023-11-07 RX ORDER — CLONAZEPAM 2 MG/1
TABLET ORAL
Qty: 60 TABLET | Refills: 4 | Status: SHIPPED | OUTPATIENT
Start: 2023-11-07 | End: 2024-05-08

## 2023-11-09 ENCOUNTER — CLINICAL DOCUMENTATION (OUTPATIENT)
Age: 43
End: 2023-11-09

## 2024-01-31 DIAGNOSIS — L30.9 DERMATITIS, UNSPECIFIED: ICD-10-CM

## 2024-01-31 DIAGNOSIS — J45.20 MILD INTERMITTENT ASTHMA, UNCOMPLICATED: ICD-10-CM

## 2024-01-31 RX ORDER — LEVOCETIRIZINE DIHYDROCHLORIDE 5 MG/1
TABLET, FILM COATED ORAL
Qty: 90 TABLET | Refills: 2 | Status: SHIPPED | OUTPATIENT
Start: 2024-01-31

## 2024-01-31 RX ORDER — ALBUTEROL SULFATE 90 UG/1
AEROSOL, METERED RESPIRATORY (INHALATION)
Qty: 6.7 EACH | Refills: 5 | Status: SHIPPED | OUTPATIENT
Start: 2024-01-31

## 2024-03-18 NOTE — ED TRIAGE NOTES
Pt arrives ambulatory to treatment room in no obvious distress complaining of nausea, lower back and lower abdominal pain since Friday. Diarrhea started today. Denies vomiting.
done

## 2024-04-18 ENCOUNTER — OFFICE VISIT (OUTPATIENT)
Age: 44
End: 2024-04-18
Payer: COMMERCIAL

## 2024-04-18 ENCOUNTER — TELEPHONE (OUTPATIENT)
Age: 44
End: 2024-04-18

## 2024-04-18 VITALS
TEMPERATURE: 98.7 F | DIASTOLIC BLOOD PRESSURE: 82 MMHG | HEIGHT: 67 IN | BODY MASS INDEX: 39.39 KG/M2 | HEART RATE: 70 BPM | SYSTOLIC BLOOD PRESSURE: 122 MMHG | RESPIRATION RATE: 20 BRPM | WEIGHT: 251 LBS | OXYGEN SATURATION: 99 %

## 2024-04-18 DIAGNOSIS — K64.9 HEMORRHOIDS, UNSPECIFIED HEMORRHOID TYPE: ICD-10-CM

## 2024-04-18 DIAGNOSIS — J30.2 SEASONAL ALLERGIES: Primary | ICD-10-CM

## 2024-04-18 DIAGNOSIS — Z86.2 HISTORY OF IRON DEFICIENCY ANEMIA: ICD-10-CM

## 2024-04-18 DIAGNOSIS — R09.81 NASAL CONGESTION: ICD-10-CM

## 2024-04-18 DIAGNOSIS — J34.89 SINUS PAIN: ICD-10-CM

## 2024-04-18 DIAGNOSIS — J02.9 SORE THROAT: ICD-10-CM

## 2024-04-18 LAB
EXP DATE SOLUTION: NORMAL
EXP DATE SWAB: NORMAL
EXPIRATION DATE: NORMAL
LOT NUMBER POC: NORMAL
LOT NUMBER SOLUTION: NORMAL
LOT NUMBER SWAB: NORMAL
SARS-COV-2 RNA, POC: NEGATIVE

## 2024-04-18 PROCEDURE — 87635 SARS-COV-2 COVID-19 AMP PRB: CPT | Performed by: PHYSICIAN ASSISTANT

## 2024-04-18 PROCEDURE — 3074F SYST BP LT 130 MM HG: CPT | Performed by: PHYSICIAN ASSISTANT

## 2024-04-18 PROCEDURE — 3079F DIAST BP 80-89 MM HG: CPT | Performed by: PHYSICIAN ASSISTANT

## 2024-04-18 PROCEDURE — 99214 OFFICE O/P EST MOD 30 MIN: CPT | Performed by: PHYSICIAN ASSISTANT

## 2024-04-18 RX ORDER — FLUTICASONE PROPIONATE 50 MCG
2 SPRAY, SUSPENSION (ML) NASAL DAILY
Qty: 16 G | Refills: 5 | Status: SHIPPED | OUTPATIENT
Start: 2024-04-18

## 2024-04-18 RX ORDER — TRAMADOL HYDROCHLORIDE 50 MG/1
50 TABLET ORAL 2 TIMES DAILY PRN
COMMUNITY

## 2024-04-18 RX ORDER — AZELASTINE HYDROCHLORIDE, FLUTICASONE PROPIONATE 137; 50 UG/1; UG/1
SPRAY, METERED NASAL
Qty: 1 EACH | Refills: 3 | Status: SHIPPED | OUTPATIENT
Start: 2024-04-18

## 2024-04-18 RX ORDER — AZELASTINE 1 MG/ML
2 SPRAY, METERED NASAL 2 TIMES DAILY
Qty: 60 ML | Refills: 5 | Status: SHIPPED | OUTPATIENT
Start: 2024-04-18

## 2024-04-18 RX ORDER — HYDROCORTISONE ACETATE 25 MG/1
25 SUPPOSITORY RECTAL EVERY 12 HOURS
Qty: 12 SUPPOSITORY | Refills: 1 | Status: SHIPPED | OUTPATIENT
Start: 2024-04-18

## 2024-04-18 SDOH — ECONOMIC STABILITY: FOOD INSECURITY: WITHIN THE PAST 12 MONTHS, THE FOOD YOU BOUGHT JUST DIDN'T LAST AND YOU DIDN'T HAVE MONEY TO GET MORE.: NEVER TRUE

## 2024-04-18 SDOH — ECONOMIC STABILITY: HOUSING INSECURITY
IN THE LAST 12 MONTHS, WAS THERE A TIME WHEN YOU DID NOT HAVE A STEADY PLACE TO SLEEP OR SLEPT IN A SHELTER (INCLUDING NOW)?: NO

## 2024-04-18 SDOH — ECONOMIC STABILITY: FOOD INSECURITY: WITHIN THE PAST 12 MONTHS, YOU WORRIED THAT YOUR FOOD WOULD RUN OUT BEFORE YOU GOT MONEY TO BUY MORE.: NEVER TRUE

## 2024-04-18 SDOH — ECONOMIC STABILITY: INCOME INSECURITY: HOW HARD IS IT FOR YOU TO PAY FOR THE VERY BASICS LIKE FOOD, HOUSING, MEDICAL CARE, AND HEATING?: NOT HARD AT ALL

## 2024-04-18 ASSESSMENT — PATIENT HEALTH QUESTIONNAIRE - PHQ9
SUM OF ALL RESPONSES TO PHQ QUESTIONS 1-9: 0
2. FEELING DOWN, DEPRESSED OR HOPELESS: NOT AT ALL
1. LITTLE INTEREST OR PLEASURE IN DOING THINGS: NOT AT ALL
SUM OF ALL RESPONSES TO PHQ9 QUESTIONS 1 & 2: 0

## 2024-04-18 NOTE — PROGRESS NOTES
Monica Schilling is a 44 y.o. female , id x 2(name and ). Reviewed record, history, and  medications.      Chief Complaint   Patient presents with    URI     Patient c/o headache, head congestion, ears hurt, sore throat, runny nose, cough, some sob, using inhaler, since Saturday, worse over the last 2 days, no exposure, no covid test.     Hemorrhoids     Patient c/o hemorrhoids, since Monday, this is a chronic issue for the patient, no bleeding. Patient does not have a Gastro primary.          Vitals:    24 1050   Weight: 113.9 kg (251 lb)   Height: 1.702 m (5' 7\")               2024    10:54 AM   PHQ-9    Little interest or pleasure in doing things 0   Feeling down, depressed, or hopeless 0   PHQ-2 Score 0   PHQ-9 Total Score 0            No data to display                Social Determinants of Health     Tobacco Use: Medium Risk (2024)    Patient History     Smoking Tobacco Use: Former     Smokeless Tobacco Use: Never     Passive Exposure: Not on file   Alcohol Use: Not on file   Financial Resource Strain: Low Risk  (2024)    Overall Financial Resource Strain (CARDIA)     Difficulty of Paying Living Expenses: Not hard at all   Food Insecurity: No Food Insecurity (2024)    Hunger Vital Sign     Worried About Running Out of Food in the Last Year: Never true     Ran Out of Food in the Last Year: Never true   Transportation Needs: Unknown (2024)    PRAPARE - Transportation     Lack of Transportation (Medical): Not on file     Lack of Transportation (Non-Medical): No   Physical Activity: Not on file   Stress: Not on file   Social Connections: Not on file   Intimate Partner Violence: Not on file   Depression: Not at risk (2024)    PHQ-2     PHQ-2 Score: 0   Housing Stability: Unknown (2024)    Housing Stability Vital Sign     Unable to Pay for Housing in the Last Year: Not on file     Number of Places Lived in the Last Year: Not on file     Unstable Housing in the Last Year: No 
    Unable to Pay for Housing in the Last Year: Not on file     Number of Places Lived in the Last Year: Not on file     Unstable Housing in the Last Year: No   Interpersonal Safety: Not on file   Utilities: Not on file       \"Have you been to the ER, urgent care clinic since your last visit?  Hospitalized since your last visit?\"    YES - When: approximately 1 months ago.  Where and Why[facility], cold symptoms, covid test/neg.    “Have you seen or consulted any other health care providers outside of [facility] since your last visit?”    NO     “Have you had a pap smear?”    YES - Where: [facility] Nurse/CMA to request most recent records if not in the chart    No cervical cancer screening on file         “Have you had a colorectal cancer screening such as a colonoscopy/FIT/Cologuard?    YES - Type: Colonoscopy - Where: Patient doesn't remember Nurse/CMA to request most recent records if not in the chart     No colonoscopy on file  No cologuard on file  No FIT/FOBT on file   No flexible sigmoidoscopy on file     Chief Complaint   Patient presents with    URI     Patient c/o headache, head congestion, ears hurt, sore throat, runny nose, cough, some sob, using inhaler, since Saturday, worse over the last 2 days, no exposure, no covid test.     Hemorrhoids     Patient c/o hemorrhoids, since Monday, this is a chronic issue for the patient, no bleeding. Patient does not have a Gastro primary.      she is a 44 y.o. year old female who presents for evaluation.    Reviewed and agree with Nurse Note and duplicated in this note.  Reviewed PmHx, RxHx, FmHx, SocHx, AllgHx and updated and dated in the chart.    Review of Systems - negative except as listed above    Objective:     Vitals:    04/18/24 1050   BP: 122/82   Site: Right Upper Arm   Position: Sitting   Cuff Size: Large Adult   Pulse: 70   Resp: 20   Temp: 98.7 °F (37.1 °C)   TempSrc: Oral   SpO2: 99%   Weight: 113.9 kg (251 lb)

## 2024-04-18 NOTE — TELEPHONE ENCOUNTER
Flonase and astelin has been sent to the pharmacy to replace the dymista that her insurance will not cover.

## 2024-05-19 DIAGNOSIS — F51.04 PSYCHOPHYSIOLOGICAL INSOMNIA: ICD-10-CM

## 2024-05-19 DIAGNOSIS — F41.9 ANXIETY: ICD-10-CM

## 2024-05-21 DIAGNOSIS — F51.04 PSYCHOPHYSIOLOGICAL INSOMNIA: ICD-10-CM

## 2024-05-21 DIAGNOSIS — F41.9 ANXIETY: ICD-10-CM

## 2024-05-21 RX ORDER — CLONAZEPAM 2 MG/1
TABLET ORAL
Qty: 60 TABLET | Refills: 4 | OUTPATIENT
Start: 2024-05-21 | End: 2024-11-18

## 2024-05-21 RX ORDER — CLONAZEPAM 2 MG/1
TABLET ORAL
Qty: 30 TABLET | OUTPATIENT
Start: 2024-05-21

## 2024-05-21 NOTE — TELEPHONE ENCOUNTER
Called and spoke with patient. Advised that she needs to be seen. Patient verbalized understanding. Apt scheduled for 5/23/24

## 2024-05-25 ENCOUNTER — HOSPITAL ENCOUNTER (EMERGENCY)
Facility: HOSPITAL | Age: 44
Discharge: HOME | End: 2024-05-25
Attending: EMERGENCY MEDICINE
Payer: COMMERCIAL

## 2024-05-25 VITALS
DIASTOLIC BLOOD PRESSURE: 81 MMHG | HEART RATE: 66 BPM | SYSTOLIC BLOOD PRESSURE: 144 MMHG | BODY MASS INDEX: 37.67 KG/M2 | WEIGHT: 240 LBS | OXYGEN SATURATION: 99 % | RESPIRATION RATE: 18 BRPM | HEIGHT: 67 IN | TEMPERATURE: 97.6 F

## 2024-05-25 DIAGNOSIS — K59.00 CONSTIPATION, UNSPECIFIED CONSTIPATION TYPE: Primary | ICD-10-CM

## 2024-05-25 DIAGNOSIS — R35.0 URINARY FREQUENCY: ICD-10-CM

## 2024-05-25 LAB
APPEARANCE UR: CLEAR
BACTERIA URNS QL MICRO: ABNORMAL /HPF
BILIRUB UR QL: NEGATIVE
COLOR UR: ABNORMAL
EPITH CASTS URNS QL MICRO: ABNORMAL /LPF
GLUCOSE UR STRIP.AUTO-MCNC: NEGATIVE MG/DL
HCG UR QL: NEGATIVE
HGB UR QL STRIP: NEGATIVE
KETONES UR QL STRIP.AUTO: NEGATIVE MG/DL
LEUKOCYTE ESTERASE UR QL STRIP.AUTO: NEGATIVE
NITRITE UR QL STRIP.AUTO: NEGATIVE
PH UR STRIP: 7 (ref 5–8)
PROT UR STRIP-MCNC: NEGATIVE MG/DL
RBC #/AREA URNS HPF: ABNORMAL /HPF (ref 0–5)
SP GR UR REFRACTOMETRY: 1.01 (ref 1–1.03)
UROBILINOGEN UR QL STRIP.AUTO: 0.1 EU/DL (ref 0.2–1)
WBC URNS QL MICRO: ABNORMAL /HPF (ref 0–4)

## 2024-05-25 PROCEDURE — 81003 URINALYSIS AUTO W/O SCOPE: CPT

## 2024-05-25 PROCEDURE — 6370000000 HC RX 637 (ALT 250 FOR IP): Performed by: EMERGENCY MEDICINE

## 2024-05-25 PROCEDURE — 81025 URINE PREGNANCY TEST: CPT

## 2024-05-25 PROCEDURE — 99283 EMERGENCY DEPT VISIT LOW MDM: CPT

## 2024-05-25 RX ORDER — IBUPROFEN 800 MG/1
800 TABLET ORAL
Status: DISCONTINUED | OUTPATIENT
Start: 2024-05-25 | End: 2024-05-25

## 2024-05-25 RX ORDER — IBUPROFEN 200 MG
400 TABLET ORAL EVERY 8 HOURS PRN
Qty: 20 TABLET | Refills: 0 | Status: SHIPPED | OUTPATIENT
Start: 2024-05-25 | End: 2024-06-01

## 2024-05-25 RX ORDER — DOCUSATE SODIUM 100 MG/1
100 CAPSULE, LIQUID FILLED ORAL 2 TIMES DAILY
Qty: 30 CAPSULE | Refills: 0 | Status: SHIPPED | OUTPATIENT
Start: 2024-05-25 | End: 2024-06-24

## 2024-05-25 RX ORDER — ACETAMINOPHEN 500 MG
1000 TABLET ORAL
Status: COMPLETED | OUTPATIENT
Start: 2024-05-25 | End: 2024-05-25

## 2024-05-25 RX ORDER — POLYETHYLENE GLYCOL 3350 17 G/17G
17 POWDER ORAL DAILY
Qty: 116 G | Refills: 0 | Status: SHIPPED | OUTPATIENT
Start: 2024-05-25 | End: 2024-06-24

## 2024-05-25 RX ORDER — ACETAMINOPHEN 500 MG
1000 TABLET ORAL EVERY 8 HOURS PRN
Qty: 360 TABLET | Refills: 1 | Status: SHIPPED | OUTPATIENT
Start: 2024-05-25

## 2024-05-25 RX ADMIN — ACETAMINOPHEN 1000 MG: 500 TABLET ORAL at 03:40

## 2024-05-25 ASSESSMENT — PAIN SCALES - GENERAL: PAINLEVEL_OUTOF10: 7

## 2024-05-25 ASSESSMENT — LIFESTYLE VARIABLES
HOW OFTEN DO YOU HAVE A DRINK CONTAINING ALCOHOL: NEVER
HOW MANY STANDARD DRINKS CONTAINING ALCOHOL DO YOU HAVE ON A TYPICAL DAY: PATIENT DOES NOT DRINK

## 2024-05-25 ASSESSMENT — PAIN - FUNCTIONAL ASSESSMENT: PAIN_FUNCTIONAL_ASSESSMENT: 0-10

## 2024-05-25 NOTE — ED PROVIDER NOTES
symptoms other than urinary frequency.  Alert nontoxic-appearing.  Abdomen soft nontender without guarding or rebound.  No CVA tenderness.    UA is unremarkable with negative pregnancy.  No signs of UTI.  No clinical concern for bowel obstruction/appendicitis/or diverticulitis that warrants radiographic study  Patient was discharged with symptomatic treatment ibuprofen Tylenol for lower abdominal discomfort and urinary frequency likely due to constipation.  Patient was discharged with bowel cleansing regimen that consists of Dulcolax and MiraLAX.  Return cautions given            Records Reviewed (source and summary of external notes): Prior medical records and Nursing notes.    Vitals:    Vitals:    05/25/24 0311   BP: (!) 144/81   Pulse: 66   Resp: 18   Temp: 97.6 °F (36.4 °C)   TempSrc: Oral   SpO2: 99%   Weight: 108.9 kg (240 lb)   Height: 1.702 m (5' 7\")        ED COURSE     Ambulated well without difficulty.  No nausea or vomiting.  Tolerated p.o. well without vomiting      Patient was given the following medications:  Medications   acetaminophen (TYLENOL) tablet 1,000 mg (1,000 mg Oral Given 5/25/24 0340)       CONSULTS: See ED Course/MDM for further details.            PROCEDURES   Unless otherwise noted above, none  Procedures      CRITICAL CARE TIME       ED IMPRESSION     1. Constipation, unspecified constipation type    2. Urinary frequency          DISPOSITION/PLAN   DISPOSITION Decision To Discharge 05/25/2024 03:36:51 AM    Discharge Note: The patient is stable for discharge home. The signs, symptoms, diagnosis, and discharge instructions have been discussed, understanding conveyed, and agreed upon. The patient is to follow up as recommended or return to ER should their symptoms worsen.      PATIENT REFERRED TO:  Your Primary Care doctor    Call in 1 day          DISCHARGE MEDICATIONS:     Medication List        START taking these medications      acetaminophen 500 MG tablet  Commonly known as:

## 2024-05-29 ENCOUNTER — TELEMEDICINE (OUTPATIENT)
Age: 44
End: 2024-05-29
Payer: COMMERCIAL

## 2024-05-29 DIAGNOSIS — F51.04 PSYCHOPHYSIOLOGICAL INSOMNIA: ICD-10-CM

## 2024-05-29 DIAGNOSIS — F41.9 ANXIETY: ICD-10-CM

## 2024-05-29 PROCEDURE — 99213 OFFICE O/P EST LOW 20 MIN: CPT | Performed by: NURSE PRACTITIONER

## 2024-05-29 RX ORDER — CLONAZEPAM 2 MG/1
TABLET ORAL
Qty: 30 TABLET | Refills: 1 | Status: SHIPPED | OUTPATIENT
Start: 2024-05-29 | End: 2024-12-19

## 2024-05-29 NOTE — PROGRESS NOTES
Monica Schilling, was evaluated through a synchronous (real-time) audio-video encounter. The patient (or guardian if applicable) is aware that this is a billable service, which includes applicable co-pays. This Virtual Visit was conducted with patient's (and/or legal guardian's) consent. Patient identification was verified, and a caregiver was present when appropriate.   The patient was located at Home: [address] [place] [address] [place] [address]  Provider was located at Home (Appt Dept State): [place]  Confirm you are appropriately licensed, registered, or certified to deliver care in the state where the patient is located as indicated above. If you are not or unsure, please re-schedule the visit: Yes, I confirm.     Monica Schilling (:  1980) is a Established patient, presenting virtually for evaluation of the following:    Assessment & Plan   Below is the assessment and plan developed based on review of pertinent history, physical exam, labs, studies, and medications.  1. Anxiety  -     clonazePAM (KLONOPIN) 2 MG tablet; TAKE 1 TABLET BY MOUTH EVERY DAY AS NEEDED FOR ANXIETYTAKE 1 TABLET BY MOUTH EVERY DAY AS NEEDED FOR ANXIETY, Disp-30 tablet, R-1Normal  2. Psychophysiological insomnia  -     clonazePAM (KLONOPIN) 2 MG tablet; TAKE 1 TABLET BY MOUTH EVERY DAY AS NEEDED FOR ANXIETYTAKE 1 TABLET BY MOUTH EVERY DAY AS NEEDED FOR ANXIETY, Disp-30 tablet, R-1Normal    Refills updated today, encouraged her to cut them in half for a more appropriate dose  Will make appt for cpx in the near future with labs    No follow-ups on file.       Subjective   HPI  She is due for refill of klonopin 2mg  Takes a few times a week  Does leave her groggy the next day  Takes for sleep    Review of Systems   A comprehensive review of system was obtained and negative except findings in the HPI      Objective   Patient-Reported Vitals  No data recorded     Physical Exam  [INSTRUCTIONS:  \"[x]\" Indicates a positive item  \"[]\"

## 2024-05-29 NOTE — PROGRESS NOTES
,  Chief Complaint   Patient presents with    Follow-up    Medication Refill     Patient is on virtual today for a f/u and med refills.    No other concerns.    Patient stated she is in the [facility] at the time of this virtual visit.      \"Have you been to the ER, urgent care clinic since your last visit?  Hospitalized since your last visit?\"    YES - When: approximately 5/25 ago.  Where and Why: constipation SEC.    “Have you seen or consulted any other health care providers outside of [facility] since your last visit?”    NO    Have you had a mammogram?”   NO    No breast cancer screening on file      “Have you had a pap smear?”    NO    No cervical cancer screening on file         “Have you had a colorectal cancer screening such as a colonoscopy/FIT/Cologuard?    NO    No colonoscopy on file  No cologuard on file  No FIT/FOBT on file   No flexible sigmoidoscopy on file         Click Here for Release of Records Request

## 2024-05-29 NOTE — TELEPHONE ENCOUNTER
..ED to Inpatient Handoff Report    Notified Maria G that electronic handoff available and patient ready for transport to room 725.    Safety Risks: None identified    Patient in Restraints: no    Constant Observer or Patient : no    Telemetry Monitoring Ordered: Yes          Order to transfer to unit without monitor: NO    Last MEWS: 1 Time completed: 0108         Vitals:    05/28/24 1616 05/28/24 2240 05/29/24 0048 05/29/24 0108   BP: (!) 145/75 (!) 141/69 135/66 132/63   Pulse: (!) 120 100 (!) 103 99   Resp: 16 16 16 16   Temp: 97.5 °F (36.4 °C)   97.9 °F (36.6 °C)   TempSrc:    Oral   SpO2: 99% 99% 94% 96%   Weight: 85.3 kg (188 lb)      Height: 1.549 m (5' 1\")          Opportunity for questions and clarification was provided.     Call patient to schedule npt appt patient stated she would call back when she get off work. sdh

## 2024-07-02 ENCOUNTER — HOSPITAL ENCOUNTER (EMERGENCY)
Facility: HOSPITAL | Age: 44
Discharge: HOME | End: 2024-07-02
Attending: EMERGENCY MEDICINE
Payer: COMMERCIAL

## 2024-07-02 ENCOUNTER — APPOINTMENT (OUTPATIENT)
Facility: HOSPITAL | Age: 44
End: 2024-07-02
Payer: COMMERCIAL

## 2024-07-02 VITALS
TEMPERATURE: 99 F | WEIGHT: 240 LBS | RESPIRATION RATE: 18 BRPM | DIASTOLIC BLOOD PRESSURE: 81 MMHG | SYSTOLIC BLOOD PRESSURE: 148 MMHG | BODY MASS INDEX: 37.67 KG/M2 | OXYGEN SATURATION: 98 % | HEART RATE: 77 BPM | HEIGHT: 67 IN

## 2024-07-02 DIAGNOSIS — M79.10 MYALGIA: Primary | ICD-10-CM

## 2024-07-02 DIAGNOSIS — B34.9 VIRAL ILLNESS: ICD-10-CM

## 2024-07-02 LAB
ALBUMIN SERPL-MCNC: 4.2 G/DL (ref 3.5–5.2)
ALBUMIN/GLOB SERPL: 1.2 (ref 1.1–2.2)
ALP SERPL-CCNC: 63 U/L (ref 35–104)
ALT SERPL-CCNC: 6 U/L (ref 10–35)
ANION GAP SERPL CALC-SCNC: 10 MMOL/L (ref 5–15)
APPEARANCE UR: CLEAR
AST SERPL-CCNC: 12 U/L (ref 10–35)
BACTERIA URNS QL MICRO: NEGATIVE /HPF
BASOPHILS # BLD: 0.1 K/UL (ref 0–0.1)
BASOPHILS NFR BLD: 1 % (ref 0–1)
BILIRUB SERPL-MCNC: 0.5 MG/DL (ref 0.2–1)
BILIRUB UR QL: NEGATIVE
BUN SERPL-MCNC: 10 MG/DL (ref 6–20)
BUN/CREAT SERPL: 17 (ref 12–20)
CALCIUM SERPL-MCNC: 8.9 MG/DL (ref 8.6–10)
CHLORIDE SERPL-SCNC: 104 MMOL/L (ref 98–107)
CO2 SERPL-SCNC: 24 MMOL/L (ref 22–29)
COLOR UR: ABNORMAL
CREAT SERPL-MCNC: 0.6 MG/DL (ref 0.5–0.9)
DIFFERENTIAL METHOD BLD: ABNORMAL
EOSINOPHIL # BLD: 0.4 K/UL (ref 0–0.4)
EOSINOPHIL NFR BLD: 4 % (ref 0–7)
EPITH CASTS URNS QL MICRO: ABNORMAL /LPF
ERYTHROCYTE [DISTWIDTH] IN BLOOD BY AUTOMATED COUNT: 19.8 % (ref 11.5–14.5)
FLUAV RNA SPEC QL NAA+PROBE: NOT DETECTED
FLUBV RNA SPEC QL NAA+PROBE: NOT DETECTED
GLOBULIN SER CALC-MCNC: 3.4 G/DL (ref 2–4)
GLUCOSE SERPL-MCNC: 85 MG/DL (ref 65–100)
GLUCOSE UR STRIP.AUTO-MCNC: NEGATIVE MG/DL
HCG UR QL: NEGATIVE
HCT VFR BLD AUTO: 29.2 % (ref 35–47)
HGB BLD-MCNC: 8.3 G/DL (ref 11.5–16)
HGB UR QL STRIP: NEGATIVE
IMM GRANULOCYTES # BLD AUTO: 0 K/UL (ref 0–0.04)
IMM GRANULOCYTES NFR BLD AUTO: 0 % (ref 0–0.5)
KETONES UR QL STRIP.AUTO: NEGATIVE MG/DL
LEUKOCYTE ESTERASE UR QL STRIP.AUTO: NEGATIVE
LIPASE SERPL-CCNC: 25 U/L (ref 13–60)
LYMPHOCYTES # BLD: 2.6 K/UL (ref 0.8–3.5)
LYMPHOCYTES NFR BLD: 24 % (ref 12–49)
MCH RBC QN AUTO: 19.8 PG (ref 26–34)
MCHC RBC AUTO-ENTMCNC: 28.4 G/DL (ref 30–36.5)
MCV RBC AUTO: 69.5 FL (ref 80–99)
MONOCYTES # BLD: 0.6 K/UL (ref 0–1)
MONOCYTES NFR BLD: 6 % (ref 5–13)
MUCOUS THREADS URNS QL MICRO: ABNORMAL /LPF
NEUTS SEG # BLD: 7.1 K/UL (ref 1.8–8)
NEUTS SEG NFR BLD: 65 % (ref 32–75)
NITRITE UR QL STRIP.AUTO: NEGATIVE
NRBC # BLD: 0 K/UL (ref 0–0.01)
NRBC BLD-RTO: 0 PER 100 WBC
PH UR STRIP: 6 (ref 5–8)
PLATELET # BLD AUTO: 427 K/UL (ref 150–400)
PLATELET COMMENT: ABNORMAL
PMV BLD AUTO: 10.5 FL (ref 8.9–12.9)
POTASSIUM SERPL-SCNC: 4.2 MMOL/L (ref 3.5–5.1)
PROT SERPL-MCNC: 7.6 G/DL (ref 6.4–8.3)
PROT UR STRIP-MCNC: NEGATIVE MG/DL
RBC # BLD AUTO: 4.2 M/UL (ref 3.8–5.2)
RBC #/AREA URNS HPF: ABNORMAL /HPF
RBC MORPH BLD: ABNORMAL
SARS-COV-2 RNA RESP QL NAA+PROBE: NOT DETECTED
SODIUM SERPL-SCNC: 138 MMOL/L (ref 136–145)
SP GR UR REFRACTOMETRY: 1.01 (ref 1–1.03)
SPECIMEN HOLD: NORMAL
UROBILINOGEN UR QL STRIP.AUTO: 0.2 EU/DL (ref 0.2–1)
WBC # BLD AUTO: 10.8 K/UL (ref 3.6–11)
WBC URNS QL MICRO: ABNORMAL /HPF (ref 0–4)

## 2024-07-02 PROCEDURE — 96374 THER/PROPH/DIAG INJ IV PUSH: CPT

## 2024-07-02 PROCEDURE — 81025 URINE PREGNANCY TEST: CPT

## 2024-07-02 PROCEDURE — 80053 COMPREHEN METABOLIC PANEL: CPT

## 2024-07-02 PROCEDURE — 6360000004 HC RX CONTRAST MEDICATION: Performed by: EMERGENCY MEDICINE

## 2024-07-02 PROCEDURE — 36415 COLL VENOUS BLD VENIPUNCTURE: CPT

## 2024-07-02 PROCEDURE — 74177 CT ABD & PELVIS W/CONTRAST: CPT

## 2024-07-02 PROCEDURE — 83690 ASSAY OF LIPASE: CPT

## 2024-07-02 PROCEDURE — 99285 EMERGENCY DEPT VISIT HI MDM: CPT

## 2024-07-02 PROCEDURE — 6360000002 HC RX W HCPCS: Performed by: EMERGENCY MEDICINE

## 2024-07-02 PROCEDURE — 81001 URINALYSIS AUTO W/SCOPE: CPT

## 2024-07-02 PROCEDURE — 87636 SARSCOV2 & INF A&B AMP PRB: CPT

## 2024-07-02 PROCEDURE — 85025 COMPLETE CBC W/AUTO DIFF WBC: CPT

## 2024-07-02 PROCEDURE — 2580000003 HC RX 258: Performed by: EMERGENCY MEDICINE

## 2024-07-02 RX ORDER — KETOROLAC TROMETHAMINE 30 MG/ML
15 INJECTION, SOLUTION INTRAMUSCULAR; INTRAVENOUS ONCE
Status: COMPLETED | OUTPATIENT
Start: 2024-07-02 | End: 2024-07-02

## 2024-07-02 RX ORDER — 0.9 % SODIUM CHLORIDE 0.9 %
1000 INTRAVENOUS SOLUTION INTRAVENOUS ONCE
Status: COMPLETED | OUTPATIENT
Start: 2024-07-02 | End: 2024-07-02

## 2024-07-02 RX ADMIN — KETOROLAC TROMETHAMINE 15 MG: 30 INJECTION, SOLUTION INTRAMUSCULAR at 20:18

## 2024-07-02 RX ADMIN — SODIUM CHLORIDE 1000 ML: 9 INJECTION, SOLUTION INTRAVENOUS at 20:18

## 2024-07-02 RX ADMIN — IOPAMIDOL 100 ML: 755 INJECTION, SOLUTION INTRAVENOUS at 20:57

## 2024-07-02 ASSESSMENT — LIFESTYLE VARIABLES
HOW MANY STANDARD DRINKS CONTAINING ALCOHOL DO YOU HAVE ON A TYPICAL DAY: PATIENT DOES NOT DRINK
HOW OFTEN DO YOU HAVE A DRINK CONTAINING ALCOHOL: NEVER

## 2024-07-02 ASSESSMENT — PAIN - FUNCTIONAL ASSESSMENT: PAIN_FUNCTIONAL_ASSESSMENT: 0-10

## 2024-07-02 ASSESSMENT — PAIN SCALES - GENERAL
PAINLEVEL_OUTOF10: 0
PAINLEVEL_OUTOF10: 6

## 2024-07-02 ASSESSMENT — ENCOUNTER SYMPTOMS
NAUSEA: 1
VOMITING: 0
ABDOMINAL PAIN: 1

## 2024-07-02 ASSESSMENT — PAIN DESCRIPTION - LOCATION: LOCATION: HEAD

## 2024-07-02 NOTE — ED TRIAGE NOTES
Pt presents with cold like symptoms that started this morning when they woke up. States they have bad allergies so they have a hard time distinguishing their symptoms for that. States they have a headache and chills.     NO medication taken prior to arrival

## 2024-07-03 NOTE — ED PROVIDER NOTES
[facility] EMERGENCY DEPT  EMERGENCY DEPARTMENT ENCOUNTER      Pt Name: Monica Schilling  MRN: [id number]  Birthdate 1980  Date of evaluation: 7/2/2024  Provider: Key Garcia MD    CHIEF COMPLAINT       Chief Complaint   Patient presents with    Chills    Headache         HISTORY OF PRESENT ILLNESS   (Location/Symptom, Timing/Onset, Context/Setting, Quality, Duration, Modifying Factors, Severity)  Note limiting factors.   The history is provided by the patient.   Abdominal Pain  Pain location:  Generalized  Pain quality: aching and cramping    Pain radiates to:  RLQ  Pain severity:  Moderate  Duration:  2 days  Timing:  Constant  Progression:  Waxing and waning  Chronicity:  New  Context: not recent illness, not sick contacts and not suspicious food intake    Ineffective treatments:  None tried  Associated symptoms: chills, fatigue and nausea    Associated symptoms: no fever and no vomiting    Risk factors: multiple surgeries          Review of External Medical Records:     Nursing Notes were reviewed.    REVIEW OF SYSTEMS    (2-9 systems for level 4, 10 or more for level 5)     Review of Systems   Constitutional:  Positive for chills and fatigue. Negative for fever.   HENT:  Positive for congestion.    Gastrointestinal:  Positive for abdominal pain and nausea. Negative for vomiting.       Except as noted above the remainder of the review of systems was reviewed and negative.       PAST MEDICAL HISTORY     Past Medical History:   Diagnosis Date    Anemia     Anxiety     Arthritis     Asthma     Chronic pain     Contact dermatitis and eczema due to cause 03/01/2020    Hypertension     Rotator cuff tear     Seasonal allergic rhinitis          SURGICAL HISTORY       Past Surgical History:   Procedure Laterality Date    CHOLECYSTECTOMY      CYST REMOVAL      GASTRIC BAND      ORTHOPEDIC SURGERY      ankle surgery    TUBAL LIGATION           CURRENT MEDICATIONS       Previous Medications    ACETAMINOPHEN (TYLENOL)

## 2024-07-24 DIAGNOSIS — F41.9 ANXIETY: ICD-10-CM

## 2024-07-24 DIAGNOSIS — F51.04 PSYCHOPHYSIOLOGICAL INSOMNIA: ICD-10-CM

## 2024-07-24 RX ORDER — CLONAZEPAM 2 MG/1
TABLET ORAL
Qty: 30 TABLET | Refills: 1 | OUTPATIENT
Start: 2024-07-24 | End: 2025-02-13

## 2024-07-26 DIAGNOSIS — F51.04 PSYCHOPHYSIOLOGICAL INSOMNIA: ICD-10-CM

## 2024-07-26 DIAGNOSIS — F41.9 ANXIETY: ICD-10-CM

## 2024-07-26 RX ORDER — CLONAZEPAM 2 MG/1
TABLET ORAL
Qty: 30 TABLET | Refills: 1 | OUTPATIENT
Start: 2024-07-26

## 2024-07-30 ENCOUNTER — TELEPHONE (OUTPATIENT)
Age: 44
End: 2024-07-30

## 2024-07-30 DIAGNOSIS — F41.9 ANXIETY: ICD-10-CM

## 2024-07-30 DIAGNOSIS — F51.04 PSYCHOPHYSIOLOGICAL INSOMNIA: ICD-10-CM

## 2024-07-30 RX ORDER — CLONAZEPAM 2 MG/1
2 TABLET ORAL NIGHTLY PRN
Qty: 30 TABLET | Refills: 0 | Status: SHIPPED | OUTPATIENT
Start: 2024-07-30 | End: 2024-08-29

## 2024-07-30 NOTE — TELEPHONE ENCOUNTER
Patient would like to speak with nurse as to why her Clonazepam was denied. Patient is working until 8:00. Patient's phone: [phone]

## 2024-07-30 NOTE — TELEPHONE ENCOUNTER
Patient stated that her last appointment was in May 05/29/24. Can the next appointment vv if she needs it?

## 2024-09-03 DIAGNOSIS — F41.9 ANXIETY: ICD-10-CM

## 2024-09-03 DIAGNOSIS — L30.9 DERMATITIS, UNSPECIFIED: ICD-10-CM

## 2024-09-03 DIAGNOSIS — J45.20 MILD INTERMITTENT ASTHMA, UNCOMPLICATED: ICD-10-CM

## 2024-09-03 DIAGNOSIS — F51.04 PSYCHOPHYSIOLOGICAL INSOMNIA: ICD-10-CM

## 2024-09-03 RX ORDER — ALBUTEROL SULFATE 90 UG/1
2 AEROSOL, METERED RESPIRATORY (INHALATION) 4 TIMES DAILY
Qty: 6.7 EACH | Refills: 5 | Status: SHIPPED | OUTPATIENT
Start: 2024-09-03

## 2024-09-03 RX ORDER — CLONAZEPAM 2 MG/1
2 TABLET ORAL NIGHTLY PRN
Qty: 30 TABLET | Refills: 0 | Status: SHIPPED | OUTPATIENT
Start: 2024-09-03 | End: 2024-10-03

## 2024-09-03 RX ORDER — LEVOCETIRIZINE DIHYDROCHLORIDE 5 MG/1
5 TABLET, FILM COATED ORAL DAILY
Qty: 90 TABLET | Refills: 2 | Status: SHIPPED | OUTPATIENT
Start: 2024-09-03

## 2024-10-06 DIAGNOSIS — F51.04 PSYCHOPHYSIOLOGICAL INSOMNIA: ICD-10-CM

## 2024-10-06 DIAGNOSIS — F41.9 ANXIETY: ICD-10-CM

## 2024-10-07 RX ORDER — CLONAZEPAM 2 MG/1
2 TABLET ORAL NIGHTLY PRN
Qty: 30 TABLET | Refills: 0 | OUTPATIENT
Start: 2024-10-07 | End: 2024-11-06

## 2024-10-10 DIAGNOSIS — F51.04 PSYCHOPHYSIOLOGICAL INSOMNIA: ICD-10-CM

## 2024-10-10 DIAGNOSIS — F41.9 ANXIETY: ICD-10-CM

## 2024-10-10 RX ORDER — CLONAZEPAM 2 MG/1
2 TABLET ORAL NIGHTLY PRN
Qty: 30 TABLET | Refills: 0 | Status: SHIPPED | OUTPATIENT
Start: 2024-10-10 | End: 2024-11-09

## 2024-10-16 ENCOUNTER — CLINICAL DOCUMENTATION (OUTPATIENT)
Age: 44
End: 2024-10-16

## 2024-10-16 ENCOUNTER — TELEMEDICINE (OUTPATIENT)
Age: 44
End: 2024-10-16

## 2024-10-16 DIAGNOSIS — D50.0 IRON DEFICIENCY ANEMIA DUE TO CHRONIC BLOOD LOSS: ICD-10-CM

## 2024-10-16 DIAGNOSIS — R73.03 PREDIABETES: ICD-10-CM

## 2024-10-16 DIAGNOSIS — I10 ESSENTIAL HYPERTENSION WITH GOAL BLOOD PRESSURE LESS THAN 140/90: ICD-10-CM

## 2024-10-16 DIAGNOSIS — Z00.00 WELL EXAM WITHOUT ABNORMAL FINDINGS OF PATIENT 18 YEARS OF AGE OR OLDER: Primary | ICD-10-CM

## 2024-10-16 RX ORDER — AMLODIPINE BESYLATE 5 MG/1
5 TABLET ORAL DAILY
Qty: 30 TABLET | Refills: 5 | Status: SHIPPED | OUTPATIENT
Start: 2024-10-16

## 2024-10-16 NOTE — PROGRESS NOTES
[facility]. A copy was made and faxed successfully to the number provided then sent to central scanning. Copy was placed in Forms folder for future reference.

## 2024-10-16 NOTE — PROGRESS NOTES
Chief Complaint   Patient presents with    Follow-up    Lab Collection    Medication Refill     Patient presents in the office today for a f/u, labs, med refills.  Patient stated she would like her iron checked stated she used to do iron infusions.  Patient stated that her blood pressure has been a little elevated as well, last one was 140/89.  No other concerns.    Patient stated she is in the [facility] at the time of this virtual visit.      \"Have you been to the ER, urgent care clinic since your last visit?  Hospitalized since your last visit?\"    NO    “Have you seen or consulted any other health care providers outside our system since your last visit?”    NO    Have you had a mammogram?”   NO    No breast cancer screening on file      “Have you had a pap smear?”    NO    No cervical cancer screening on file       “Have you had a colorectal cancer screening such as a colonoscopy/FIT/Cologuard?    NO    No colonoscopy on file  No cologuard on file  No FIT/FOBT on file   No flexible sigmoidoscopy on file           
blood pressure medication, having recently noticed slightly elevated readings. She had previously discontinued the medication due to stable blood pressure levels, but now feels the need to resume it. The last recorded reading was approximately 140/90. She also mentions that she underwent iron infusions at [place] about 2.5 years ago due to low iron levels, as oral iron supplements were causing stomach upset. She is uncertain if she needs to repeat this treatment. She denies any history of abnormal sugar levels.    FAMILY HISTORY  She denies any family history of diabetes.    Review of Systems   A comprehensive review of system was obtained and negative except findings in the HPI      Objective   There were no vitals taken for this visit.  Physical Exam  Deferred due to VV     Monica Schilling, was evaluated through a synchronous (real-time) audio-video encounter. The patient (or guardian if applicable) is aware that this is a billable service, which includes applicable co-pays. This Virtual Visit was conducted with patient's (and/or legal guardian's) consent. Patient identification was verified, and a caregiver was present when appropriate.   The patient was located at Home: [address] [place] [address]  Provider was located at Home [place]  Confirm you are appropriately licensed, registered, or certified to deliver care in the state where the patient is located as indicated above. If you are not or unsure, please re-schedule the visit: Yes, I confirm.        Total time spent for this encounter:  20m    --ILENE Solis NP on 10/16/2024 at 4:16 PM    An electronic signature was used to authenticate this note.          The patient (or guardian, if applicable) and other individuals in attendance with the patient were advised that Artificial Intelligence will be utilized during this visit to record, process the conversation to generate a clinical note and to support

## 2024-11-06 ENCOUNTER — APPOINTMENT (OUTPATIENT)
Facility: HOSPITAL | Age: 44
End: 2024-11-06
Payer: COMMERCIAL

## 2024-11-06 ENCOUNTER — HOSPITAL ENCOUNTER (EMERGENCY)
Facility: HOSPITAL | Age: 44
Discharge: HOME | End: 2024-11-06
Payer: COMMERCIAL

## 2024-11-06 VITALS
BODY MASS INDEX: 37.43 KG/M2 | WEIGHT: 239 LBS | SYSTOLIC BLOOD PRESSURE: 120 MMHG | HEART RATE: 85 BPM | DIASTOLIC BLOOD PRESSURE: 66 MMHG | RESPIRATION RATE: 18 BRPM | OXYGEN SATURATION: 99 % | TEMPERATURE: 98.2 F

## 2024-11-06 DIAGNOSIS — R11.2 NAUSEA VOMITING AND DIARRHEA: ICD-10-CM

## 2024-11-06 DIAGNOSIS — E87.6 HYPOKALEMIA: ICD-10-CM

## 2024-11-06 DIAGNOSIS — R19.7 NAUSEA VOMITING AND DIARRHEA: ICD-10-CM

## 2024-11-06 DIAGNOSIS — B34.9 VIRAL ILLNESS: Primary | ICD-10-CM

## 2024-11-06 DIAGNOSIS — R05.1 ACUTE COUGH: ICD-10-CM

## 2024-11-06 LAB
ALBUMIN SERPL-MCNC: 3.5 G/DL (ref 3.5–5)
ALBUMIN/GLOB SERPL: 0.8 (ref 1.1–2.2)
ALP SERPL-CCNC: 65 U/L (ref 45–117)
ALT SERPL-CCNC: 18 U/L (ref 12–78)
ANION GAP SERPL CALC-SCNC: 3 MMOL/L (ref 2–12)
AST SERPL W P-5'-P-CCNC: 10 U/L (ref 15–37)
BASOPHILS # BLD: 0.1 K/UL (ref 0–0.1)
BASOPHILS NFR BLD: 1 % (ref 0–1)
BILIRUB SERPL-MCNC: 0.8 MG/DL (ref 0.2–1)
BUN SERPL-MCNC: 5 MG/DL (ref 6–20)
BUN/CREAT SERPL: 8 (ref 12–20)
CA-I BLD-MCNC: 9.1 MG/DL (ref 8.5–10.1)
CHLORIDE SERPL-SCNC: 108 MMOL/L (ref 97–108)
CO2 SERPL-SCNC: 27 MMOL/L (ref 21–32)
CREAT SERPL-MCNC: 0.6 MG/DL (ref 0.55–1.02)
DIFFERENTIAL METHOD BLD: ABNORMAL
EOSINOPHIL # BLD: 0.3 K/UL (ref 0–0.4)
EOSINOPHIL NFR BLD: 3 % (ref 0–7)
ERYTHROCYTE [DISTWIDTH] IN BLOOD BY AUTOMATED COUNT: 20.3 % (ref 11.5–14.5)
GLOBULIN SER CALC-MCNC: 4.3 G/DL (ref 2–4)
GLUCOSE SERPL-MCNC: 97 MG/DL (ref 65–100)
HCT VFR BLD AUTO: 29.7 % (ref 35–47)
HGB BLD-MCNC: 8.3 G/DL (ref 11.5–16)
IMM GRANULOCYTES # BLD AUTO: 0 K/UL (ref 0–0.04)
IMM GRANULOCYTES NFR BLD AUTO: 0 % (ref 0–0.5)
LIPASE SERPL-CCNC: 27 U/L (ref 13–75)
LYMPHOCYTES # BLD: 2.1 K/UL (ref 0.8–3.5)
LYMPHOCYTES NFR BLD: 18 % (ref 12–49)
MCH RBC QN AUTO: 19.9 PG (ref 26–34)
MCHC RBC AUTO-ENTMCNC: 27.9 G/DL (ref 30–36.5)
MCV RBC AUTO: 71.1 FL (ref 80–99)
MONOCYTES # BLD: 0.6 K/UL (ref 0–1)
MONOCYTES NFR BLD: 5 % (ref 5–13)
NEUTS SEG # BLD: 8.3 K/UL (ref 1.8–8)
NEUTS SEG NFR BLD: 73 % (ref 32–75)
NRBC # BLD: 0 K/UL (ref 0–0.01)
NRBC BLD-RTO: 0 PER 100 WBC
PLATELET # BLD AUTO: 470 K/UL (ref 150–400)
POTASSIUM SERPL-SCNC: 3.2 MMOL/L (ref 3.5–5.1)
PROT SERPL-MCNC: 7.8 G/DL (ref 6.4–8.2)
RBC # BLD AUTO: 4.18 M/UL (ref 3.8–5.2)
RBC MORPH BLD: ABNORMAL
RBC MORPH BLD: ABNORMAL
SODIUM SERPL-SCNC: 138 MMOL/L (ref 136–145)
WBC # BLD AUTO: 11.4 K/UL (ref 3.6–11)

## 2024-11-06 PROCEDURE — 85025 COMPLETE CBC W/AUTO DIFF WBC: CPT

## 2024-11-06 PROCEDURE — 96374 THER/PROPH/DIAG INJ IV PUSH: CPT

## 2024-11-06 PROCEDURE — 6360000002 HC RX W HCPCS

## 2024-11-06 PROCEDURE — 6370000000 HC RX 637 (ALT 250 FOR IP)

## 2024-11-06 PROCEDURE — 36415 COLL VENOUS BLD VENIPUNCTURE: CPT

## 2024-11-06 PROCEDURE — 80053 COMPREHEN METABOLIC PANEL: CPT

## 2024-11-06 PROCEDURE — 96375 TX/PRO/DX INJ NEW DRUG ADDON: CPT

## 2024-11-06 PROCEDURE — 83690 ASSAY OF LIPASE: CPT

## 2024-11-06 PROCEDURE — 99284 EMERGENCY DEPT VISIT MOD MDM: CPT

## 2024-11-06 PROCEDURE — 71046 X-RAY EXAM CHEST 2 VIEWS: CPT

## 2024-11-06 RX ORDER — DEXTROMETHORPHAN HYDROBROMIDE AND PROMETHAZINE HYDROCHLORIDE 15; 6.25 MG/5ML; MG/5ML
5 SYRUP ORAL 4 TIMES DAILY PRN
Qty: 118 ML | Refills: 0 | Status: SHIPPED | OUTPATIENT
Start: 2024-11-06 | End: 2024-11-13

## 2024-11-06 RX ORDER — LORATADINE 10 MG/1
10 TABLET ORAL DAILY
Qty: 30 TABLET | Refills: 0 | Status: SHIPPED | OUTPATIENT
Start: 2024-11-06

## 2024-11-06 RX ORDER — ONDANSETRON 4 MG/1
4 TABLET, FILM COATED ORAL 3 TIMES DAILY PRN
Qty: 15 TABLET | Refills: 0 | Status: SHIPPED | OUTPATIENT
Start: 2024-11-06

## 2024-11-06 RX ORDER — KETOROLAC TROMETHAMINE 30 MG/ML
30 INJECTION, SOLUTION INTRAMUSCULAR; INTRAVENOUS ONCE
Status: COMPLETED | OUTPATIENT
Start: 2024-11-06 | End: 2024-11-06

## 2024-11-06 RX ORDER — ONDANSETRON 2 MG/ML
4 INJECTION INTRAMUSCULAR; INTRAVENOUS ONCE
Status: COMPLETED | OUTPATIENT
Start: 2024-11-06 | End: 2024-11-06

## 2024-11-06 RX ADMIN — POTASSIUM BICARBONATE 40 MEQ: 782 TABLET, EFFERVESCENT ORAL at 13:22

## 2024-11-06 RX ADMIN — ONDANSETRON 4 MG: 2 INJECTION INTRAMUSCULAR; INTRAVENOUS at 12:13

## 2024-11-06 RX ADMIN — KETOROLAC TROMETHAMINE 30 MG: 30 INJECTION, SOLUTION INTRAMUSCULAR at 12:13

## 2024-11-06 ASSESSMENT — PAIN - FUNCTIONAL ASSESSMENT
PAIN_FUNCTIONAL_ASSESSMENT: 0-10
PAIN_FUNCTIONAL_ASSESSMENT: 0-10

## 2024-11-06 ASSESSMENT — PAIN SCALES - GENERAL
PAINLEVEL_OUTOF10: 9
PAINLEVEL_OUTOF10: 3

## 2024-11-06 NOTE — ED TRIAGE NOTES
Pt reports she has been sick since Sunday, c/o HA, n/v/d, body aches. Pt reports she has been around coworkers that have been sick, and as tested for covid and flu and it was negative.

## 2024-11-06 NOTE — ED PROVIDER NOTES
ED Course User Index  [CL] Odessa Albrecht PA-C       SEPSIS Reassessment: Sepsis reassessment not applicable    Clinical Management Tools:  Not Applicable    Patient was given the following medications:  Medications   ondansetron (ZOFRAN) injection 4 mg (4 mg IntraVENous Given 11/6/24 1213)   ketorolac (TORADOL) injection 30 mg (30 mg IntraVENous Given 11/6/24 1213)       CONSULTS: See ED Course/MDM for further details.  None     Social Determinants affecting Diagnosis/Treatment: None    Smoking Cessation: Not Applicable    PROCEDURES   Unless otherwise noted above, none.  Procedures      CRITICAL CARE TIME   Patient does not meet Critical Care Time, 0 minutes    ED IMPRESSION     1. Viral illness    2. Nausea vomiting and diarrhea    3. Acute cough    4. Hypokalemia          DISPOSITION/PLAN   DISPOSITION          Discharge Note: The patient is stable for discharge home. The signs, symptoms, diagnosis, and discharge instructions have been discussed, understanding conveyed, and agreed upon. The patient is to follow up as recommended or return to ER should their symptoms worsen.      PATIENT REFERRED TO:  Teressa Howe, APRN - [place]  [id number] [facility]  [address]  [facility] [address] [phone]              DISCHARGE MEDICATIONS:     Medication List        START taking these medications      loratadine 10 MG tablet  Commonly known as: Claritin  Take 1 tablet by mouth daily     ondansetron 4 MG tablet  Commonly known as: ZOFRAN  Take 1 tablet by mouth 3 times daily as needed for Nausea or Vomiting     promethazine-dextromethorphan 6.25-15 MG/5ML syrup  Commonly known as: PROMETHAZINE-DM  Take 5 mLs by mouth 4 times daily as needed for Cough            ASK your doctor about these medications      acetaminophen 500 MG tablet  Commonly known as: TYLENOL  Take 2 tablets by mouth every 8 hours as needed for Fever or Pain     albuterol sulfate  (90 Base) MCG/ACT inhaler  Commonly known as:

## 2024-11-10 DIAGNOSIS — F51.04 PSYCHOPHYSIOLOGICAL INSOMNIA: ICD-10-CM

## 2024-11-10 DIAGNOSIS — F41.9 ANXIETY: ICD-10-CM

## 2024-11-11 RX ORDER — CLONAZEPAM 2 MG/1
2 TABLET ORAL NIGHTLY PRN
Qty: 30 TABLET | Refills: 0 | Status: SHIPPED | OUTPATIENT
Start: 2024-11-11 | End: 2024-12-11

## 2024-11-13 NOTE — TELEPHONE ENCOUNTER
----- Message from Keyona Lopez LPN sent at 4/18/2024  1:21 PM EDT -----  Regarding: FW: Nasal spray  Contact: [phone]    ----- Message -----  From: Monica Schilling \"Aliss\"  Sent: 4/18/2024  12:00 PM EDT  To: [facility] Clinical Staff  Subject: Nasal spray                                      Hello. The pharmacy is saying that my insurance will not cover the nasal spray that you prescribed. Can you prescribe an alternative?     [facility]  DIVISION OF OTOLARYNGOLOGY- HEAD & NECK SURGERY  CONSULT    Patient Name: Ashley Melara  Medical Record Number:  [id number]  Primary Care Physician:  Han Fuentes MD  Date of Consultation: 11/15/2024    Chief Complaint:   Chief Complaint   Patient presents with    Tinnitus    Follow-up     Tinnitus /Hearing loss 1 year follow up.    Hearing Problem      HISTORY OF PRESENT ILLNESS  Ashley is a(n) 78 y.o. female who presents for evaluation of hearing loss.  She has a baseline hearing loss that was last tested in June 2023.  At the beginning in November she got a sinus infection and was told that she had fluid in her ears.  She was started on antibiotics and steroids and has been having difficulty with hearing since then.  The antibiotics and steroids also caused her to be dizzy for 1 day.    Interval History 12/15/2023  Ashley thinks that her ears are getting better.  They are still slightly full but better overall    Interval History 11/15/24  Ashley is doing well and is still bothered slightly with the tinnitus    Patient Active Problem List   Diagnosis    Hyperlipidemia    Benign essential HTN    Osteoarthrosis involving lower leg    Paranoid type schizophrenia (HCC)    Achilles tendinitis    Diabetes mellitus, type II (HCC)    Hyperlipidemia associated with type 2 diabetes mellitus (HCC)    Morbid obesity with BMI of 40.0-44.9, adult    Unsteady gait     Past Surgical History:   Procedure Laterality Date    BLEPHAROPLASTY  5/10/2012    COLONOSCOPY  8/1/2007    JOINT REPLACEMENT       Family History   Problem Relation Age of Onset    Cancer Sister         Ovarian cancer    Stroke Sister     Dementia Brother      Social History     Socioeconomic History    Marital status:      Spouse name: Not on file    Number of children: Not on file    Years of education: Not on file    Highest education level: Not on file   Occupational History    Not on file   Tobacco Use    Smoking

## 2024-12-08 DIAGNOSIS — L30.9 DERMATITIS, UNSPECIFIED: ICD-10-CM

## 2024-12-08 DIAGNOSIS — F51.04 PSYCHOPHYSIOLOGICAL INSOMNIA: ICD-10-CM

## 2024-12-08 DIAGNOSIS — F41.9 ANXIETY: ICD-10-CM

## 2024-12-09 RX ORDER — LEVOCETIRIZINE DIHYDROCHLORIDE 5 MG/1
5 TABLET, FILM COATED ORAL DAILY
Qty: 90 TABLET | Refills: 2 | Status: SHIPPED | OUTPATIENT
Start: 2024-12-09

## 2024-12-09 RX ORDER — CLONAZEPAM 2 MG/1
2 TABLET ORAL NIGHTLY PRN
Qty: 30 TABLET | Refills: 0 | Status: SHIPPED | OUTPATIENT
Start: 2024-12-09 | End: 2025-01-08

## 2025-01-08 DIAGNOSIS — F41.9 ANXIETY: ICD-10-CM

## 2025-01-08 DIAGNOSIS — J45.20 MILD INTERMITTENT ASTHMA, UNCOMPLICATED: ICD-10-CM

## 2025-01-08 DIAGNOSIS — F51.04 PSYCHOPHYSIOLOGICAL INSOMNIA: ICD-10-CM

## 2025-01-08 RX ORDER — ALBUTEROL SULFATE 90 UG/1
2 INHALANT RESPIRATORY (INHALATION) 4 TIMES DAILY
Qty: 6.7 EACH | Refills: 5 | Status: SHIPPED | OUTPATIENT
Start: 2025-01-08

## 2025-01-08 RX ORDER — CLONAZEPAM 2 MG/1
2 TABLET ORAL NIGHTLY PRN
Qty: 10 TABLET | Refills: 0 | Status: SHIPPED | OUTPATIENT
Start: 2025-01-08 | End: 2025-01-18

## 2025-01-12 SDOH — ECONOMIC STABILITY: TRANSPORTATION INSECURITY
IN THE PAST 12 MONTHS, HAS LACK OF TRANSPORTATION KEPT YOU FROM MEETINGS, WORK, OR FROM GETTING THINGS NEEDED FOR DAILY LIVING?: NO

## 2025-01-12 SDOH — ECONOMIC STABILITY: FOOD INSECURITY: WITHIN THE PAST 12 MONTHS, THE FOOD YOU BOUGHT JUST DIDN'T LAST AND YOU DIDN'T HAVE MONEY TO GET MORE.: NEVER TRUE

## 2025-01-12 SDOH — ECONOMIC STABILITY: INCOME INSECURITY: IN THE LAST 12 MONTHS, WAS THERE A TIME WHEN YOU WERE NOT ABLE TO PAY THE MORTGAGE OR RENT ON TIME?: NO

## 2025-01-12 SDOH — ECONOMIC STABILITY: FOOD INSECURITY: WITHIN THE PAST 12 MONTHS, YOU WORRIED THAT YOUR FOOD WOULD RUN OUT BEFORE YOU GOT MONEY TO BUY MORE.: NEVER TRUE

## 2025-01-12 SDOH — ECONOMIC STABILITY: TRANSPORTATION INSECURITY
IN THE PAST 12 MONTHS, HAS THE LACK OF TRANSPORTATION KEPT YOU FROM MEDICAL APPOINTMENTS OR FROM GETTING MEDICATIONS?: NO

## 2025-01-15 ENCOUNTER — OFFICE VISIT (OUTPATIENT)
Facility: CLINIC | Age: 45
End: 2025-01-15

## 2025-01-15 VITALS
WEIGHT: 262 LBS | DIASTOLIC BLOOD PRESSURE: 81 MMHG | OXYGEN SATURATION: 99 % | BODY MASS INDEX: 41.12 KG/M2 | SYSTOLIC BLOOD PRESSURE: 124 MMHG | HEART RATE: 83 BPM | HEIGHT: 67 IN | RESPIRATION RATE: 16 BRPM | TEMPERATURE: 99 F

## 2025-01-15 DIAGNOSIS — J30.2 SEASONAL ALLERGIES: ICD-10-CM

## 2025-01-15 DIAGNOSIS — I10 ESSENTIAL HYPERTENSION WITH GOAL BLOOD PRESSURE LESS THAN 140/90: ICD-10-CM

## 2025-01-15 DIAGNOSIS — Z00.01 ENCOUNTER FOR GENERAL ADULT MEDICAL EXAMINATION WITH ABNORMAL FINDINGS: Primary | ICD-10-CM

## 2025-01-15 DIAGNOSIS — R73.03 PREDIABETES: ICD-10-CM

## 2025-01-15 RX ORDER — CETIRIZINE HYDROCHLORIDE 10 MG/1
10 TABLET ORAL DAILY
Qty: 30 TABLET | Refills: 0 | Status: SHIPPED | OUTPATIENT
Start: 2025-01-15

## 2025-01-15 ASSESSMENT — ENCOUNTER SYMPTOMS
ABDOMINAL PAIN: 0
SHORTNESS OF BREATH: 0
SORE THROAT: 0
ALLERGIC/IMMUNOLOGIC NEGATIVE: 1
SINUS PAIN: 0
COUGH: 0
DIARRHEA: 0
CONSTIPATION: 0
RHINORRHEA: 0
EYES NEGATIVE: 1

## 2025-01-15 ASSESSMENT — PATIENT HEALTH QUESTIONNAIRE - PHQ9
SUM OF ALL RESPONSES TO PHQ QUESTIONS 1-9: 0
SUM OF ALL RESPONSES TO PHQ QUESTIONS 1-9: 0
2. FEELING DOWN, DEPRESSED OR HOPELESS: NOT AT ALL
SUM OF ALL RESPONSES TO PHQ QUESTIONS 1-9: 0
SUM OF ALL RESPONSES TO PHQ9 QUESTIONS 1 & 2: 0
1. LITTLE INTEREST OR PLEASURE IN DOING THINGS: NOT AT ALL
SUM OF ALL RESPONSES TO PHQ QUESTIONS 1-9: 0

## 2025-01-15 NOTE — PATIENT INSTRUCTIONS
Lao speaking     13. [facility]   [phone]   [email or web address]   -Lv Begum MD (ages 13+, PTSD, substance abuse)   -Christina Joyner DO (ages 18+, psychopharmacology, substance abuse, anxiety)   -Diana Dobson NP (ages 6+, psychosis, depression)   Open days, evenings and weekends   Accepts Aetna, UHC, Optum, and Cigna pending   No Medicare or Medicaid     14. [facility] Alternate Location:   [address] [place] [address] [address] [place], [place] [address] [phone]   Have to leave message and wait for call back   -Kassie Ochoa NP - Accepts BRITTANY Goodwin/BS, Medicare, [employer] Plus, Cigna. No Aetna or UHC or [employer]. Works Mondays and Fridays  -Rosana Melissa NP - Accepts [place], Humana   -Aline Salazar NP - Works Fridays only     15. Placido Mistry DO   [address] [address]   [phone]   -his wife, Tavia Sung Fidelia, LCSW works in office     16. Bernard White MD   [address] [address] [phone]   [email or web address]   Accepts Aetna, Cigna, [place], Medicare, UHC Medicaid, straight Medicaid is pending   *phone is answered “Medical Billing”     17. [facility]   [address] [address] [phone]   Have to leave a message and wait for call back   [email or web address]   No Medicare, Humana, Optima, Optum, or [employer]     18. [facility]   [address] [place] [address] [address] [phone]   *Medical Marijuana evaluations , ADHD computer testing [email or web address]-[email or web address]   Accepts most major insurances, Medicare, Medicaid, [facility]   No Humana or      19. [facility]   [address] [address] [phone]   [email or web address]   - Mansoor Carmichael MD (ages 3-59, ADHD,

## 2025-01-15 NOTE — PROGRESS NOTES
Chief Complaint   Patient presents with    Forms     FMLA    Weight Management    Medication Refill    Allergies     Patient states consent for provider to use [facility] System for documentation of this visit.    \"Have you been to the ER, urgent care clinic since your last visit?  Hospitalized since your last visit?\"    NO    “Have you seen or consulted any other health care providers outside our system since your last visit?”    YES - When: approximately 1 days ago.  Where and Why: Dr Alvarez, Dr Jolley.    Have you had a mammogram?”   NO    No breast cancer screening on file      “Have you had a pap smear?”    YES - Where: [facility]: Carolann Panchal Nurse/MELVIN to request most recent records if not in the chart    No cervical cancer screening on file       “Have you had a colorectal cancer screening such as a colonoscopy/FIT/Cologuard?    NO    No colonoscopy on file  No cologuard on file  No FIT/FOBT on file   No flexible sigmoidoscopy on file            
paperwork for her work accommodation would be completed within the next few days and that she would be notified via [employer] once it was ready for collection. She was advised to consult with a counselor or therapist, particularly one from [facility] or [place] [address], who could potentially manage her medication and provide additional support. She was also informed about the increased risk of addiction associated with Klonopin and tramadol and was encouraged to explore alternative therapies or medications through counseling to reduce this risk. A refill for Klonopin 2 mg will be provided. Spoke to patient about seeing a psychiatrist for medication management regarding the Klonopin.    4. Medication Management.  She was advised to inform the provider when she is within 3 days of running out of tramadol so that a refill can be provided for a couple of months. She was also informed that she would need to have a virtual visit every 3 months to maintain this medication.    Results      Assessment & Plan    Return in about 6 months (around 7/15/2025).  Monica \"Kayceetina\" was seen today for forms, weight management, medication refill and allergies.    Diagnoses and all orders for this visit:    Encounter for general adult medical examination with abnormal findings    BMI 40.0-44.9, adult    Essential hypertension with goal blood pressure less than 140/90    Prediabetes    Seasonal allergies           Chief Complaint   Patient presents with    Forms     FMLA    Weight Management    Medication Refill    Allergies     She is a 45 y.o. female who presents for evalution.     Reviewed PmHx, RxHx, FmHx, SocHx, AllgHx and updated and dated in the chart.    CURRENT MEDS W/ ASSOC DIAG           Start Date End Date     acetaminophen (TYLENOL) 500 MG tablet  05/25/24  --     Take 2 tablets by mouth every 8 hours as needed for Fever or Pain     Associated Diagnoses:  --     albuterol sulfate HFA (PROVENTIL;VENTOLIN;PROAIR) 108 (90 Base)

## 2025-01-21 DIAGNOSIS — F41.9 ANXIETY: ICD-10-CM

## 2025-01-21 DIAGNOSIS — F51.04 PSYCHOPHYSIOLOGICAL INSOMNIA: ICD-10-CM

## 2025-01-22 RX ORDER — CLONAZEPAM 2 MG/1
2 TABLET ORAL NIGHTLY PRN
Qty: 30 TABLET | Refills: 0 | Status: SHIPPED | OUTPATIENT
Start: 2025-01-22 | End: 2025-02-21

## 2025-02-03 ENCOUNTER — CLINICAL DOCUMENTATION (OUTPATIENT)
Facility: CLINIC | Age: 45
End: 2025-02-03

## 2025-02-04 RX ORDER — ONDANSETRON 4 MG/1
4 TABLET, FILM COATED ORAL 3 TIMES DAILY PRN
Qty: 15 TABLET | Refills: 0 | Status: SHIPPED | OUTPATIENT
Start: 2025-02-04

## 2025-02-07 DIAGNOSIS — R73.03 PREDIABETES: ICD-10-CM

## 2025-02-07 DIAGNOSIS — I10 ESSENTIAL HYPERTENSION WITH GOAL BLOOD PRESSURE LESS THAN 140/90: ICD-10-CM

## 2025-02-07 RX ORDER — TIRZEPATIDE 2.5 MG/.5ML
INJECTION, SOLUTION SUBCUTANEOUS
Qty: 2 ML | Refills: 5 | Status: SHIPPED | OUTPATIENT
Start: 2025-02-07

## 2025-02-10 ENCOUNTER — HOSPITAL ENCOUNTER (EMERGENCY)
Facility: HOSPITAL | Age: 45
Discharge: HOME | End: 2025-02-10
Attending: STUDENT IN AN ORGANIZED HEALTH CARE EDUCATION/TRAINING PROGRAM
Payer: COMMERCIAL

## 2025-02-10 ENCOUNTER — APPOINTMENT (OUTPATIENT)
Facility: HOSPITAL | Age: 45
End: 2025-02-10
Payer: COMMERCIAL

## 2025-02-10 VITALS
RESPIRATION RATE: 18 BRPM | SYSTOLIC BLOOD PRESSURE: 145 MMHG | HEART RATE: 83 BPM | DIASTOLIC BLOOD PRESSURE: 79 MMHG | TEMPERATURE: 98.3 F | HEIGHT: 67 IN | OXYGEN SATURATION: 100 % | BODY MASS INDEX: 37.67 KG/M2 | WEIGHT: 240 LBS

## 2025-02-10 DIAGNOSIS — R11.0 NAUSEA: ICD-10-CM

## 2025-02-10 DIAGNOSIS — J06.9 ACUTE UPPER RESPIRATORY INFECTION: Primary | ICD-10-CM

## 2025-02-10 LAB
ALBUMIN SERPL-MCNC: 3.1 G/DL (ref 3.5–5)
ALBUMIN/GLOB SERPL: 0.7 (ref 1.1–2.2)
ALP SERPL-CCNC: 65 U/L (ref 45–117)
ALT SERPL-CCNC: 11 U/L (ref 12–78)
ANION GAP SERPL CALC-SCNC: 6 MMOL/L (ref 2–12)
AST SERPL W P-5'-P-CCNC: 12 U/L (ref 15–37)
BASOPHILS # BLD: 0.08 K/UL (ref 0–0.1)
BASOPHILS NFR BLD: 1.2 % (ref 0–1)
BILIRUB SERPL-MCNC: 0.6 MG/DL (ref 0.2–1)
BUN SERPL-MCNC: 4 MG/DL (ref 6–20)
BUN/CREAT SERPL: 6 (ref 12–20)
CA-I BLD-MCNC: 8.3 MG/DL (ref 8.5–10.1)
CHLORIDE SERPL-SCNC: 108 MMOL/L (ref 97–108)
CO2 SERPL-SCNC: 27 MMOL/L (ref 21–32)
CREAT SERPL-MCNC: 0.64 MG/DL (ref 0.55–1.02)
DIFFERENTIAL METHOD BLD: ABNORMAL
EKG ATRIAL RATE: 73 BPM
EKG DIAGNOSIS: NORMAL
EKG P AXIS: 16 DEGREES
EKG P-R INTERVAL: 150 MS
EKG Q-T INTERVAL: 390 MS
EKG QRS DURATION: 76 MS
EKG QTC CALCULATION (BAZETT): 429 MS
EKG R AXIS: 22 DEGREES
EKG T AXIS: 30 DEGREES
EKG VENTRICULAR RATE: 73 BPM
EOSINOPHIL # BLD: 0.25 K/UL (ref 0–0.4)
EOSINOPHIL NFR BLD: 3.7 % (ref 0–7)
ERYTHROCYTE [DISTWIDTH] IN BLOOD BY AUTOMATED COUNT: 20.1 % (ref 11.5–14.5)
GLOBULIN SER CALC-MCNC: 4.2 G/DL (ref 2–4)
GLUCOSE SERPL-MCNC: 90 MG/DL (ref 65–100)
HCT VFR BLD AUTO: 30.4 % (ref 35–47)
HGB BLD-MCNC: 8.3 G/DL (ref 11.5–16)
IMM GRANULOCYTES # BLD AUTO: 0.01 K/UL (ref 0–0.04)
IMM GRANULOCYTES NFR BLD AUTO: 0.1 % (ref 0–0.5)
LYMPHOCYTES # BLD: 1.99 K/UL (ref 0.8–3.5)
LYMPHOCYTES NFR BLD: 29.3 % (ref 12–49)
MCH RBC QN AUTO: 19.4 PG (ref 26–34)
MCHC RBC AUTO-ENTMCNC: 27.3 G/DL (ref 30–36.5)
MCV RBC AUTO: 71 FL (ref 80–99)
MONOCYTES # BLD: 0.64 K/UL (ref 0–1)
MONOCYTES NFR BLD: 9.4 % (ref 5–13)
NEUTS SEG # BLD: 3.83 K/UL (ref 1.8–8)
NEUTS SEG NFR BLD: 56.3 % (ref 32–75)
NRBC # BLD: 0 K/UL (ref 0–0.01)
NRBC BLD-RTO: 0 PER 100 WBC
PLATELET # BLD AUTO: 351 K/UL (ref 150–400)
PMV BLD AUTO: 10.1 FL (ref 8.9–12.9)
POTASSIUM SERPL-SCNC: 3.7 MMOL/L (ref 3.5–5.1)
PROT SERPL-MCNC: 7.3 G/DL (ref 6.4–8.2)
RBC # BLD AUTO: 4.28 M/UL (ref 3.8–5.2)
RBC MORPH BLD: ABNORMAL
SODIUM SERPL-SCNC: 141 MMOL/L (ref 136–145)
WBC # BLD AUTO: 6.8 K/UL (ref 3.6–11)

## 2025-02-10 PROCEDURE — 71045 X-RAY EXAM CHEST 1 VIEW: CPT

## 2025-02-10 PROCEDURE — 94640 AIRWAY INHALATION TREATMENT: CPT

## 2025-02-10 PROCEDURE — 94761 N-INVAS EAR/PLS OXIMETRY MLT: CPT

## 2025-02-10 PROCEDURE — 99285 EMERGENCY DEPT VISIT HI MDM: CPT

## 2025-02-10 PROCEDURE — 6370000000 HC RX 637 (ALT 250 FOR IP): Performed by: STUDENT IN AN ORGANIZED HEALTH CARE EDUCATION/TRAINING PROGRAM

## 2025-02-10 PROCEDURE — 6360000002 HC RX W HCPCS: Performed by: STUDENT IN AN ORGANIZED HEALTH CARE EDUCATION/TRAINING PROGRAM

## 2025-02-10 PROCEDURE — 36415 COLL VENOUS BLD VENIPUNCTURE: CPT

## 2025-02-10 PROCEDURE — 80053 COMPREHEN METABOLIC PANEL: CPT

## 2025-02-10 PROCEDURE — 96374 THER/PROPH/DIAG INJ IV PUSH: CPT

## 2025-02-10 PROCEDURE — 93005 ELECTROCARDIOGRAM TRACING: CPT | Performed by: STUDENT IN AN ORGANIZED HEALTH CARE EDUCATION/TRAINING PROGRAM

## 2025-02-10 PROCEDURE — 85025 COMPLETE CBC W/AUTO DIFF WBC: CPT

## 2025-02-10 RX ORDER — ONDANSETRON 2 MG/ML
4 INJECTION INTRAMUSCULAR; INTRAVENOUS ONCE
Status: COMPLETED | OUTPATIENT
Start: 2025-02-10 | End: 2025-02-10

## 2025-02-10 RX ORDER — ONDANSETRON 4 MG/1
4 TABLET, ORALLY DISINTEGRATING ORAL 3 TIMES DAILY PRN
Qty: 21 TABLET | Refills: 0 | Status: SHIPPED | OUTPATIENT
Start: 2025-02-10

## 2025-02-10 RX ORDER — PREDNISONE 20 MG/1
60 TABLET ORAL ONCE
Status: COMPLETED | OUTPATIENT
Start: 2025-02-10 | End: 2025-02-10

## 2025-02-10 RX ORDER — PREDNISONE 50 MG/1
50 TABLET ORAL DAILY
Qty: 5 TABLET | Refills: 0 | Status: SHIPPED | OUTPATIENT
Start: 2025-02-10 | End: 2025-02-15

## 2025-02-10 RX ORDER — IPRATROPIUM BROMIDE AND ALBUTEROL SULFATE 2.5; .5 MG/3ML; MG/3ML
1 SOLUTION RESPIRATORY (INHALATION)
Status: COMPLETED | OUTPATIENT
Start: 2025-02-10 | End: 2025-02-10

## 2025-02-10 RX ADMIN — PREDNISONE 60 MG: 20 TABLET ORAL at 14:46

## 2025-02-10 RX ADMIN — ONDANSETRON 4 MG: 2 INJECTION INTRAMUSCULAR; INTRAVENOUS at 14:47

## 2025-02-10 RX ADMIN — IPRATROPIUM BROMIDE AND ALBUTEROL SULFATE 1 DOSE: 2.5; .5 SOLUTION RESPIRATORY (INHALATION) at 12:11

## 2025-02-10 ASSESSMENT — PAIN SCALES - GENERAL
PAINLEVEL_OUTOF10: 0
PAINLEVEL_OUTOF10: 0

## 2025-02-10 ASSESSMENT — PAIN - FUNCTIONAL ASSESSMENT: PAIN_FUNCTIONAL_ASSESSMENT: 0-10

## 2025-02-10 NOTE — DISCHARGE INSTRUCTIONS
Thank you for choosing our Emergency Department for your care.  It is our privilege to care for you in your time of need.  In the next several days, you may receive a survey via email or mailed to your home about your experience with our team.  We would greatly appreciate you taking a few minutes to complete the survey, as we use this information to learn what we have done well and what we could be doing better. Thank you for trusting us with your care!    Below you will find a list of your tests from today's visit.   Labs and Radiology Studies  Recent Results (from the past 12 hour(s))   EKG 12 Lead    Collection Time: 02/10/25 11:41 AM   Result Value Ref Range    Ventricular Rate 73 BPM    Atrial Rate 73 BPM    P-R Interval 150 ms    QRS Duration 76 ms    Q-T Interval 390 ms    QTc Calculation (Bazett) 429 ms    P Axis 16 degrees    R Axis 22 degrees    T Axis 30 degrees    Diagnosis       Normal sinus rhythm  Normal ECG  When compared with ECG of 27-JUL-2011 00:42,  No significant change was found  Confirmed by NICCI TAPIA [facility] ([id number]) on 2/10/2025 12:45:17 PM     CBC with Auto Differential    Collection Time: 02/10/25 11:55 AM   Result Value Ref Range    WBC 6.8 3.6 - 11.0 K/uL    RBC 4.28 3.80 - 5.20 M/uL    Hemoglobin 8.3 (L) 11.5 - 16.0 g/dL    Hematocrit 30.4 (L) 35.0 - 47.0 %    MCV 71.0 (L) 80.0 - 99.0 FL    MCH 19.4 (L) 26.0 - 34.0 PG    MCHC 27.3 (L) 30.0 - 36.5 g/dL    RDW 20.1 (H) 11.5 - 14.5 %    Platelets 351 150 - 400 K/uL    MPV 10.1 8.9 - 12.9 FL    Nucleated RBCs 0.0 0.0  WBC    nRBC 0.00 0.00 - 0.01 K/uL    Neutrophils % 56.3 32.0 - 75.0 %    Lymphocytes % 29.3 12.0 - 49.0 %    Monocytes % 9.4 5.0 - 13.0 %    Eosinophils % 3.7 0.0 - 7.0 %    Basophils % 1.2 (H) 0.0 - 1.0 %    Immature Granulocytes % 0.1 0 - 0.5 %    Neutrophils Absolute 3.83 1.80 - 8.00 K/UL    Lymphocytes Absolute 1.99 0.80 - 3.50 K/UL    Monocytes Absolute 0.64 0.00 - 1.00 K/UL    Eosinophils Absolute 0.25 0.00 -  0.40 K/UL    Basophils Absolute 0.08 0.00 - 0.10 K/UL    Immature Granulocytes Absolute 0.01 0.00 - 0.04 K/UL    Differential Type Smear Scanned      RBC Comment Anisocytosis  2+        RBC Comment Microcytosis  1+        RBC Comment Hypochromia  3+       Comprehensive Metabolic Panel    Collection Time: 02/10/25 11:55 AM   Result Value Ref Range    Sodium 141 136 - 145 mmol/L    Potassium 3.7 3.5 - 5.1 mmol/L    Chloride 108 97 - 108 mmol/L    CO2 27 21 - 32 mmol/L    Anion Gap 6 2 - 12 mmol/L    Glucose 90 65 - 100 mg/dL    BUN 4 (L) 6 - 20 mg/dL    Creatinine 0.64 0.55 - 1.02 mg/dL    BUN/Creatinine Ratio 6 (L) 12 - 20      Est, Glom Filt Rate >90 >60 ml/min/1.73m2    Calcium 8.3 (L) 8.5 - 10.1 mg/dL    Total Bilirubin 0.6 0.2 - 1.0 mg/dL    AST 12 (L) 15 - 37 U/L    ALT 11 (L) 12 - 78 U/L    Alk Phosphatase 65 45 - 117 U/L    Total Protein 7.3 6.4 - 8.2 g/dL    Albumin 3.1 (L) 3.5 - 5.0 g/dL    Globulin 4.2 (H) 2.0 - 4.0 g/dL    Albumin/Globulin Ratio 0.7 (L) 1.1 - 2.2       XR CHEST 1 VIEW    Result Date: 2/10/2025  INDICATION:  Chest Pain EXAM: Chest single view. COMPARISON: 11/6/2024. FINDINGS: A single frontal view of the chest at 1229 hours hours shows right mid lung field atelectasis or minimal infiltrate, partially obscured by an external monitor lead..  The heart, mediastinum and pulmonary vasculature are upper normal .  The bony thorax is unremarkable for age. .     Right mid lung field atelectasis or minimal infiltrate..  . Electronically signed by FIONA TREADWELL    ------------------------------------------------------------------------------------------------------------  The evaluation and treatment you received in the Emergency Department were for an urgent problem. It is important that you follow-up with a doctor, nurse practitioner, or physician assistant to:  (1) confirm your diagnosis,  (2) re-evaluation of changes in your illness and treatment, and (3) for ongoing care. Please take your

## 2025-02-10 NOTE — ED TRIAGE NOTES
Reports cough since Fri, coughing cause chest tightness.. Reports people in household sick c same sx. Also reports she has tried home remedies

## 2025-02-10 NOTE — ED PROVIDER NOTES
[facility] EMERGENCY DEPT  EMERGENCY DEPARTMENT HISTORY AND PHYSICAL EXAM      Date: 2/10/2025  Patient Name: Monica Schilling  MRN: [id number]  Birthdate 1980  Date of evaluation: 2/10/2025  Provider: Cherise Spivey MD   Note Started: 11:32 AM EST 2/10/25    HISTORY OF PRESENT ILLNESS     Chief Complaint   Patient presents with    Cough       History Provided By: Patient    HPI: Monica Schilling is a 45 y.o. female with PMH of asthma comes to the ED for chest tightness, runny nose and congestion.  She has several members were sick with a URI.  This tested her asthma to be acting up.  Has been having wheezing and tightness in her chest has been using her inhalers.  She has been trying over-the-counter remedies without symptom improvement and thus came to the ED.  No specific chest pain.  Has been having some shortness of breath.  No lower extremity swelling or pain.  She is denying any change in bowel, dater, or any habits.    PAST MEDICAL HISTORY   Past Medical History:  Past Medical History:   Diagnosis Date    Anemia     Anxiety     Arthritis     Asthma     Chronic pain     Contact dermatitis and eczema due to cause 03/01/2020    Hypertension     Rotator cuff tear     Seasonal allergic rhinitis        Past Surgical History:  Past Surgical History:   Procedure Laterality Date    CHOLECYSTECTOMY      CYST REMOVAL      GASTRIC BAND      ORTHOPEDIC SURGERY      ankle surgery    TUBAL LIGATION         Family History:  Family History   Problem Relation Age of Onset    Hypertension Sister     Cancer Maternal Aunt     Hypertension Brother     Hypertension Mother        Social History:  Social History     Tobacco Use    Smoking status: Former    Smokeless tobacco: Never   Substance Use Topics    Alcohol use: Not Currently    Drug use: Never       Allergies:  Allergies   Allergen Reactions    Sulfamethoxazole-Trimethoprim Hives    Sulfa Antibiotics Hives       PCP: Teressa Howe, APRN - NP    Current Meds:   No current  facility-administered medications for this encounter.     Current Outpatient Medications   Medication Sig Dispense Refill    predniSONE (DELTASONE) 50 MG tablet Take 1 tablet by mouth daily for 5 days 5 tablet 0    ondansetron (ZOFRAN-ODT) 4 MG disintegrating tablet Take 1 tablet by mouth 3 times daily as needed for Nausea or Vomiting 21 tablet 0    cetirizine (ZYRTEC) 10 MG tablet Take 1 tablet by mouth daily 90 tablet 3    tirzepatide-weight management (ZEPBOUND) 2.5 MG/0.5ML SOAJ subCUTAneous auto-injector pen INJECT 2.5MG UNDER THE SKIN ONCE WEEKLY 2 mL 5    clonazePAM (KLONOPIN) 2 MG tablet Take 1 tablet by mouth nightly as needed for Anxiety for up to 30 days. Next refill requires appointment Max Daily Amount: 2 mg 30 tablet 0    albuterol sulfate HFA (PROVENTIL;VENTOLIN;PROAIR) 108 (90 Base) MCG/ACT inhaler Inhale 2 puffs into the lungs 4 times daily 6.7 each 5    levocetirizine (XYZAL) 5 MG tablet Take 1 tablet by mouth daily (Patient not taking: Reported on 1/15/2025) 90 tablet 2    loratadine (CLARITIN) 10 MG tablet Take 1 tablet by mouth daily 30 tablet 0    amLODIPine (NORVASC) 5 MG tablet Take 1 tablet by mouth daily 30 tablet 5    acetaminophen (TYLENOL) 500 MG tablet Take 2 tablets by mouth every 8 hours as needed for Fever or Pain 360 tablet 1    traMADol (ULTRAM) 50 MG tablet Take 1 tablet by mouth 2 times daily as needed.      Azelastine-Fluticasone 137-50 MCG/ACT SUSP 1 spray each nostril bid (Patient not taking: Reported on 1/15/2025) 1 each 3    azelastine (ASTELIN) 0.1 % nasal spray 2 sprays by Nasal route 2 times daily Use in each nostril as directed 60 mL 5    fluticasone (FLONASE) 50 MCG/ACT nasal spray 2 sprays by Each Nostril route daily 16 g 5       Social Determinants of Health:   Social Determinants of Health     Tobacco Use: Medium Risk (1/15/2025)    Patient History     Smoking Tobacco Use: Former     Smokeless Tobacco Use: Never     Passive Exposure: Not on file   Alcohol Use: Not At

## 2025-02-11 ENCOUNTER — COMMUNITY OUTREACH (OUTPATIENT)
Facility: CLINIC | Age: 45
End: 2025-02-11

## 2025-02-13 RX ORDER — CETIRIZINE HYDROCHLORIDE 10 MG/1
10 TABLET ORAL DAILY
Qty: 90 TABLET | Refills: 3 | Status: SHIPPED | OUTPATIENT
Start: 2025-02-13 | End: 2026-02-08

## 2025-02-21 DIAGNOSIS — F41.9 ANXIETY: ICD-10-CM

## 2025-02-21 DIAGNOSIS — F51.04 PSYCHOPHYSIOLOGICAL INSOMNIA: ICD-10-CM

## 2025-02-24 DIAGNOSIS — F41.9 ANXIETY: ICD-10-CM

## 2025-02-24 DIAGNOSIS — F51.04 PSYCHOPHYSIOLOGICAL INSOMNIA: ICD-10-CM

## 2025-02-24 RX ORDER — CLONAZEPAM 2 MG/1
2 TABLET ORAL NIGHTLY PRN
Qty: 30 TABLET | Refills: 0 | OUTPATIENT
Start: 2025-02-24 | End: 2025-03-26

## 2025-02-24 RX ORDER — CLONAZEPAM 2 MG/1
2 TABLET ORAL NIGHTLY PRN
Qty: 30 TABLET | Refills: 1 | Status: SHIPPED | OUTPATIENT
Start: 2025-02-24 | End: 2025-04-25

## 2025-02-24 RX ORDER — CETIRIZINE HYDROCHLORIDE 10 MG/1
10 TABLET ORAL DAILY
Qty: 90 TABLET | Refills: 3 | OUTPATIENT
Start: 2025-02-24 | End: 2026-02-19

## 2025-04-14 DIAGNOSIS — I10 ESSENTIAL HYPERTENSION WITH GOAL BLOOD PRESSURE LESS THAN 140/90: ICD-10-CM

## 2025-04-14 RX ORDER — AMLODIPINE BESYLATE 5 MG/1
5 TABLET ORAL DAILY
Qty: 90 TABLET | Refills: 1 | Status: SHIPPED | OUTPATIENT
Start: 2025-04-14

## 2025-04-23 ENCOUNTER — OFFICE VISIT (OUTPATIENT)
Facility: CLINIC | Age: 45
End: 2025-04-23
Payer: COMMERCIAL

## 2025-04-23 VITALS
HEART RATE: 91 BPM | SYSTOLIC BLOOD PRESSURE: 137 MMHG | BODY MASS INDEX: 41.59 KG/M2 | DIASTOLIC BLOOD PRESSURE: 84 MMHG | RESPIRATION RATE: 16 BRPM | OXYGEN SATURATION: 100 % | HEIGHT: 67 IN | TEMPERATURE: 99 F | WEIGHT: 265 LBS

## 2025-04-23 DIAGNOSIS — I10 ESSENTIAL HYPERTENSION WITH GOAL BLOOD PRESSURE LESS THAN 140/90: ICD-10-CM

## 2025-04-23 DIAGNOSIS — F41.9 ANXIETY: ICD-10-CM

## 2025-04-23 DIAGNOSIS — E03.9 HYPOTHYROIDISM, UNSPECIFIED TYPE: ICD-10-CM

## 2025-04-23 DIAGNOSIS — F51.04 PSYCHOPHYSIOLOGICAL INSOMNIA: ICD-10-CM

## 2025-04-23 PROCEDURE — 3075F SYST BP GE 130 - 139MM HG: CPT

## 2025-04-23 PROCEDURE — 3079F DIAST BP 80-89 MM HG: CPT

## 2025-04-23 PROCEDURE — 99214 OFFICE O/P EST MOD 30 MIN: CPT

## 2025-04-23 RX ORDER — CLONAZEPAM 2 MG/1
2 TABLET ORAL NIGHTLY PRN
Qty: 30 TABLET | Refills: 1 | Status: SHIPPED | OUTPATIENT
Start: 2025-04-23 | End: 2025-06-22

## 2025-04-23 ASSESSMENT — ENCOUNTER SYMPTOMS
ALLERGIC/IMMUNOLOGIC NEGATIVE: 1
EYES NEGATIVE: 1
DIARRHEA: 0
SINUS PAIN: 0
RHINORRHEA: 0
ABDOMINAL PAIN: 0
SORE THROAT: 0
CONSTIPATION: 0
SHORTNESS OF BREATH: 0
COUGH: 0

## 2025-04-23 NOTE — PROGRESS NOTES
Chief Complaint   Patient presents with    Weight Management     Zepbound    Anxiety     Med refill     Patient states consent for provider to use [facility] System for documentation of this visit.    \"Have you been to the ER, urgent care clinic since your last visit?  Hospitalized since your last visit?\"    NO    “Have you seen or consulted any other health care providers outside our system since your last visit?”    YES - When: approximately 2  weeks ago.  Where and Why: Pain DR Jak Garay.    Have you had a mammogram?”   NO    Date of last Mammogram: 8/11/2021       “Have you had a colorectal cancer screening such as a colonoscopy/FIT/Cologuard?    NO    No colonoscopy on file  No cologuard on file  No FIT/FOBT on file   No flexible sigmoidoscopy on file

## 2025-04-23 NOTE — PROGRESS NOTES
Chief Complaint   Patient presents with    Weight Management     Zepbound    Anxiety     Med refill     Patient states consent for provider to use ROSANGELA System for documentation of this visit.    \"Have you been to the ER, urgent care clinic since your last visit?  Hospitalized since your last visit?\"    NO    “Have you seen or consulted any other health care providers outside our system since your last visit?”    YES - When: approximately 2  weeks ago.  Where and Why: Pain DR Jak Garay.    Have you had a mammogram?”   NO    Date of last Mammogram: 2021       “Have you had a colorectal cancer screening such as a colonoscopy/FIT/Cologuard?    NO    No colonoscopy on file  No cologuard on file  No FIT/FOBT on file   No flexible sigmoidoscopy on file                Monica Schilling (:  1980) is a 45 y.o. female, here for evaluation of the following chief complaint(s):  Weight Management (Zepbound) and Anxiety (Med refill)        Subjective   History of Present Illness  The patient presents for evaluation of weight loss and medication management.    Efforts to modify her diet have been made, although significant physical activity has not been incorporated into her routine. Despite these dietary changes, substantial weight loss has not been observed. Her current weight, as measured at home, is 265 pounds. The pharmacist advised considering an increase in the dosage of Zepbound.    A refill of Klonopin is requested, and its effectiveness in managing symptoms is reported. [facility] [address] [facility] needs.    Review of Systems   Constitutional:  Negative for activity change, appetite change and fatigue.   HENT:  Negative for postnasal drip, rhinorrhea, sinus pain and sore throat.    Eyes: Negative.    Respiratory:  Negative for cough and shortness of breath.    Cardiovascular:  Negative for chest pain.   Gastrointestinal:  Negative for abdominal pain, constipation and diarrhea.

## 2025-06-18 DIAGNOSIS — F51.04 PSYCHOPHYSIOLOGICAL INSOMNIA: ICD-10-CM

## 2025-06-18 DIAGNOSIS — F41.9 ANXIETY: ICD-10-CM

## 2025-06-19 DIAGNOSIS — F41.9 ANXIETY: ICD-10-CM

## 2025-06-19 DIAGNOSIS — F51.04 PSYCHOPHYSIOLOGICAL INSOMNIA: ICD-10-CM

## 2025-06-21 ENCOUNTER — HOSPITAL ENCOUNTER (EMERGENCY)
Facility: HOSPITAL | Age: 45
Discharge: HOME | End: 2025-06-21
Attending: STUDENT IN AN ORGANIZED HEALTH CARE EDUCATION/TRAINING PROGRAM
Payer: COMMERCIAL

## 2025-06-21 VITALS
WEIGHT: 250 LBS | HEIGHT: 67 IN | BODY MASS INDEX: 39.24 KG/M2 | RESPIRATION RATE: 14 BRPM | SYSTOLIC BLOOD PRESSURE: 144 MMHG | TEMPERATURE: 98.6 F | OXYGEN SATURATION: 100 % | HEART RATE: 99 BPM | DIASTOLIC BLOOD PRESSURE: 82 MMHG

## 2025-06-21 DIAGNOSIS — D64.9 ANEMIA, UNSPECIFIED TYPE: ICD-10-CM

## 2025-06-21 DIAGNOSIS — M79.10 MYALGIA: Primary | ICD-10-CM

## 2025-06-21 DIAGNOSIS — E87.6 HYPOKALEMIA: ICD-10-CM

## 2025-06-21 LAB
ALBUMIN SERPL-MCNC: 4.2 G/DL (ref 3.5–5.2)
ALBUMIN/GLOB SERPL: 1.1 (ref 1.1–2.2)
ALP SERPL-CCNC: 75 U/L (ref 35–104)
ALT SERPL-CCNC: 7 U/L (ref 10–35)
ANION GAP SERPL CALC-SCNC: 15 MMOL/L (ref 2–12)
APPEARANCE UR: ABNORMAL
AST SERPL-CCNC: 16 U/L (ref 10–35)
BACTERIA URNS QL MICRO: ABNORMAL /HPF
BASOPHILS # BLD: 0.06 K/UL (ref 0–0.1)
BASOPHILS NFR BLD: 0.5 % (ref 0–1)
BILIRUB SERPL-MCNC: 0.8 MG/DL (ref 0.2–1)
BILIRUB UR QL: NEGATIVE
BUN SERPL-MCNC: 5 MG/DL (ref 6–20)
BUN/CREAT SERPL: ABNORMAL (ref 12–20)
CALCIUM SERPL-MCNC: 9.3 MG/DL (ref 8.6–10)
CHLORIDE SERPL-SCNC: 104 MMOL/L (ref 98–107)
CO2 SERPL-SCNC: 22 MMOL/L (ref 22–29)
COLOR UR: ABNORMAL
COMMENT:: NORMAL
CREAT SERPL-MCNC: <0.47 MG/DL (ref 0.5–0.9)
DIFFERENTIAL METHOD BLD: ABNORMAL
EOSINOPHIL # BLD: 0.23 K/UL (ref 0–0.4)
EOSINOPHIL NFR BLD: 1.8 % (ref 0–7)
EPITH CASTS URNS QL MICRO: ABNORMAL /LPF
ERYTHROCYTE [DISTWIDTH] IN BLOOD BY AUTOMATED COUNT: 21.8 % (ref 11.5–14.5)
FLUAV RNA SPEC QL NAA+PROBE: NOT DETECTED
FLUBV RNA SPEC QL NAA+PROBE: NOT DETECTED
GLOBULIN SER CALC-MCNC: 3.8 G/DL (ref 2–4)
GLUCOSE SERPL-MCNC: 118 MG/DL (ref 65–100)
GLUCOSE UR STRIP.AUTO-MCNC: NEGATIVE MG/DL
HCG UR QL: NEGATIVE
HCT VFR BLD AUTO: 31.2 % (ref 35–47)
HGB BLD-MCNC: 8.8 G/DL (ref 11.5–16)
HGB UR QL STRIP: NEGATIVE
IMM GRANULOCYTES # BLD AUTO: 0.04 K/UL (ref 0–0.04)
IMM GRANULOCYTES NFR BLD AUTO: 0.3 % (ref 0–0.5)
KETONES UR QL STRIP.AUTO: ABNORMAL MG/DL
LEUKOCYTE ESTERASE UR QL STRIP.AUTO: NEGATIVE
LYMPHOCYTES # BLD: 2.36 K/UL (ref 0.8–3.5)
LYMPHOCYTES NFR BLD: 18.4 % (ref 12–49)
MCH RBC QN AUTO: 19 PG (ref 26–34)
MCHC RBC AUTO-ENTMCNC: 28.2 G/DL (ref 30–36.5)
MCV RBC AUTO: 67.2 FL (ref 80–99)
MONOCYTES # BLD: 0.5 K/UL (ref 0–1)
MONOCYTES NFR BLD: 3.9 % (ref 5–13)
MUCOUS THREADS URNS QL MICRO: ABNORMAL /LPF
NEUTS SEG # BLD: 9.61 K/UL (ref 1.8–8)
NEUTS SEG NFR BLD: 75.1 % (ref 32–75)
NITRITE UR QL STRIP.AUTO: NEGATIVE
NRBC # BLD: 0 K/UL (ref 0–0.01)
NRBC BLD-RTO: 0 PER 100 WBC
PH UR STRIP: 6 (ref 5–8)
PLATELET # BLD AUTO: 348 K/UL (ref 150–400)
POTASSIUM SERPL-SCNC: 3.1 MMOL/L (ref 3.5–5.1)
PROT SERPL-MCNC: 8 G/DL (ref 6.4–8.3)
PROT UR STRIP-MCNC: NEGATIVE MG/DL
RBC # BLD AUTO: 4.64 M/UL (ref 3.8–5.2)
RBC #/AREA URNS HPF: ABNORMAL /HPF
RBC MORPH BLD: ABNORMAL
SARS-COV-2 RNA RESP QL NAA+PROBE: NOT DETECTED
SODIUM SERPL-SCNC: 141 MMOL/L (ref 136–145)
SOURCE: NORMAL
SP GR UR REFRACTOMETRY: 1.02 (ref 1–1.03)
SPECIMEN HOLD: NORMAL
SPECIMEN HOLD: NORMAL
UROBILINOGEN UR QL STRIP.AUTO: 0.2 EU/DL (ref 0.2–1)
WBC # BLD AUTO: 12.8 K/UL (ref 3.6–11)
WBC URNS QL MICRO: ABNORMAL /HPF (ref 0–4)

## 2025-06-21 PROCEDURE — 6370000000 HC RX 637 (ALT 250 FOR IP): Performed by: NURSE PRACTITIONER

## 2025-06-21 PROCEDURE — 80053 COMPREHEN METABOLIC PANEL: CPT

## 2025-06-21 PROCEDURE — 96374 THER/PROPH/DIAG INJ IV PUSH: CPT

## 2025-06-21 PROCEDURE — 6360000002 HC RX W HCPCS: Performed by: NURSE PRACTITIONER

## 2025-06-21 PROCEDURE — 81001 URINALYSIS AUTO W/SCOPE: CPT

## 2025-06-21 PROCEDURE — 81025 URINE PREGNANCY TEST: CPT

## 2025-06-21 PROCEDURE — 36415 COLL VENOUS BLD VENIPUNCTURE: CPT

## 2025-06-21 PROCEDURE — 99284 EMERGENCY DEPT VISIT MOD MDM: CPT

## 2025-06-21 PROCEDURE — 87636 SARSCOV2 & INF A&B AMP PRB: CPT

## 2025-06-21 PROCEDURE — 2580000003 HC RX 258: Performed by: NURSE PRACTITIONER

## 2025-06-21 PROCEDURE — 85025 COMPLETE CBC W/AUTO DIFF WBC: CPT

## 2025-06-21 PROCEDURE — 96375 TX/PRO/DX INJ NEW DRUG ADDON: CPT

## 2025-06-21 RX ORDER — ONDANSETRON 2 MG/ML
4 INJECTION INTRAMUSCULAR; INTRAVENOUS
Status: COMPLETED | OUTPATIENT
Start: 2025-06-21 | End: 2025-06-21

## 2025-06-21 RX ORDER — POTASSIUM CHLORIDE 750 MG/1
40 TABLET, EXTENDED RELEASE ORAL ONCE
Status: COMPLETED | OUTPATIENT
Start: 2025-06-21 | End: 2025-06-21

## 2025-06-21 RX ORDER — ONDANSETRON 4 MG/1
4 TABLET, ORALLY DISINTEGRATING ORAL
Status: DISCONTINUED | OUTPATIENT
Start: 2025-06-21 | End: 2025-06-21

## 2025-06-21 RX ORDER — KETOROLAC TROMETHAMINE 30 MG/ML
30 INJECTION, SOLUTION INTRAMUSCULAR; INTRAVENOUS
Status: COMPLETED | OUTPATIENT
Start: 2025-06-21 | End: 2025-06-21

## 2025-06-21 RX ORDER — ONDANSETRON 4 MG/1
4 TABLET, ORALLY DISINTEGRATING ORAL 3 TIMES DAILY PRN
Qty: 21 TABLET | Refills: 0 | Status: SHIPPED | OUTPATIENT
Start: 2025-06-21

## 2025-06-21 RX ORDER — 0.9 % SODIUM CHLORIDE 0.9 %
1000 INTRAVENOUS SOLUTION INTRAVENOUS ONCE
Status: COMPLETED | OUTPATIENT
Start: 2025-06-21 | End: 2025-06-21

## 2025-06-21 RX ADMIN — POTASSIUM CHLORIDE 40 MEQ: 750 TABLET, FILM COATED, EXTENDED RELEASE ORAL at 19:58

## 2025-06-21 RX ADMIN — KETOROLAC TROMETHAMINE 30 MG: 30 INJECTION, SOLUTION INTRAMUSCULAR at 19:10

## 2025-06-21 RX ADMIN — ONDANSETRON 4 MG: 2 INJECTION, SOLUTION INTRAMUSCULAR; INTRAVENOUS at 19:10

## 2025-06-21 RX ADMIN — SODIUM CHLORIDE 1000 ML: 0.9 INJECTION, SOLUTION INTRAVENOUS at 19:11

## 2025-06-21 ASSESSMENT — PAIN DESCRIPTION - LOCATION: LOCATION: GENERALIZED

## 2025-06-21 ASSESSMENT — PAIN - FUNCTIONAL ASSESSMENT: PAIN_FUNCTIONAL_ASSESSMENT: 0-10

## 2025-06-21 ASSESSMENT — PAIN SCALES - GENERAL: PAINLEVEL_OUTOF10: 8

## 2025-06-21 NOTE — ED TRIAGE NOTES
Pt arrives to ED with friend via POV. Pt ambulates independently without difficulty.    Pt states that Tuesday she began to have a headache, nausea, fatigue, generalized body aches and chills. Pt denies taking her temperature. Pt denies taking any medication prior to arrival. Pt reports decreased appetite.

## 2025-06-21 NOTE — ED PROVIDER NOTES
[facility] EMERGENCY DEPARTMENT  EMERGENCY DEPARTMENT ENCOUNTER      Pt Name: Monica Schilling  MRN: [id number]  Birthdate 1980  Date of evaluation: 6/21/2025  Provider: ILENE Trevizo NP    CHIEF COMPLAINT       Chief Complaint   Patient presents with    Fatigue    Nausea    Generalized Body Aches         HISTORY OF PRESENT ILLNESS   (Location/Symptom, Timing/Onset, Context/Setting, Quality, Duration, Modifying Factors, Severity)  Note limiting factors.   The history is provided by the patient. No  was used.     Monica Schilling is a 45 y.o. female with Hx of HTN, asthma, hypothyroidism, prediabetes, anxiety, anemia who presents ambulatory to [place] ED with cc of body aches, fatigue.     1w hx of body aches, generalized weakness/ fatigue, nausea, chills, decreased appetite. No known sick exposures, works in a [employer] center. States \"I just don't feel good.\"     Denies CP/ SOB, cough, congestion, sore throat, vomiting, diarrhea, urinary concerns. States that she \"feels dehydrated\" but drinks  \" a lot of water.\" States \"I never get sick, so I want to check things out.\"  Denies tobacco, alcohol or substance abuse.  Denies chance of pregnancy        PCP: No primary care provider on file.    There are no other complaints, changes or physical findings at this time.        Review of External Medical Records:     Nursing Notes were reviewed.    REVIEW OF SYSTEMS    (2-9 systems for level 4, 10 or more for level 5)     Review of Systems   Constitutional:  Positive for appetite change, chills and fatigue.   Gastrointestinal:  Positive for nausea.   Musculoskeletal:  Positive for myalgias.   Neurological:  Positive for weakness.       Except as noted above the remainder of the review of systems was reviewed and negative.       PAST MEDICAL HISTORY     Past Medical History:   Diagnosis Date    Anemia     Anxiety     Arthritis     Asthma     Chronic pain     Contact dermatitis and eczema due to  appointment as soon as possible for a visit         DISCHARGE MEDICATIONS:  Discharge Medication List as of 6/21/2025  8:13 PM            (Please note that portions of this note were completed with a voice recognition program.  Efforts were made to edit the dictations but occasionally words are mis-transcribed.)    ILENE Trevizo NP (electronically signed)  Emergency Attending Physician / Physician Assistant / Nurse Practitioner             Marija Landaverde APRN - NP  06/22/25 0000

## 2025-06-22 NOTE — DISCHARGE INSTRUCTIONS
Thank you for allowing us to provide you with medical care today.  We realize that you have many choices for your emergency care needs.  We thank you for choosing [facility].  Please choose us in the future for any continued health care needs.     We hope we addressed all of your medical concerns. We strive to provide excellent quality care in the Emergency Department.  Anything less than excellent does not meet our expectations.     The exam and treatment you received in the Emergency Department were for an emergent problem and are not intended as complete care. It is important that you follow up with a doctor, nurse practitioner, or physician’s assistant for ongoing care. If your symptoms worsen or you do not improve as expected and you are unable to reach your usual health care provider, you should return to the Emergency Department. We are available 24 hours a day.     Take this sheet with you when you go to your follow-up visit.     If you have any problem arranging the follow-up visit, contact the Emergency Department immediately.     Make an appointment your family doctor for follow up of this visit. Return to the ER if you are unable to be seen in a timely manner.

## 2025-06-23 DIAGNOSIS — F41.9 ANXIETY: ICD-10-CM

## 2025-06-23 DIAGNOSIS — F51.04 PSYCHOPHYSIOLOGICAL INSOMNIA: ICD-10-CM

## 2025-06-23 NOTE — TELEPHONE ENCOUNTER
Patient called in today again, stated she really needs the klonopin refilled. Stated that the pharmacy has been reaching out and stated she has requested this medication 4 times with us. Informed her that the provider was out and someone would cover his box. Patient stated she has been out of this medication for a while. Wants to know why it has not been filled.

## 2025-06-23 NOTE — TELEPHONE ENCOUNTER
Patient called into the office today, stated she and the pharmacy has put in several refill request for clonazepam. Stated she is completely out of the medication.  Please advise.

## 2025-06-24 RX ORDER — CLONAZEPAM 2 MG/1
2 TABLET ORAL NIGHTLY PRN
Qty: 30 TABLET | Refills: 1 | OUTPATIENT
Start: 2025-06-24 | End: 2025-08-23

## 2025-06-24 RX ORDER — CLONAZEPAM 2 MG/1
2 TABLET ORAL NIGHTLY PRN
Qty: 30 TABLET | Refills: 0 | Status: SHIPPED | OUTPATIENT
Start: 2025-06-24 | End: 2025-08-23

## 2025-07-18 ENCOUNTER — HOSPITAL ENCOUNTER (EMERGENCY)
Facility: HOSPITAL | Age: 45
Discharge: HOME | End: 2025-07-18
Attending: EMERGENCY MEDICINE
Payer: COMMERCIAL

## 2025-07-18 ENCOUNTER — APPOINTMENT (OUTPATIENT)
Facility: HOSPITAL | Age: 45
End: 2025-07-18
Payer: COMMERCIAL

## 2025-07-18 VITALS
TEMPERATURE: 99 F | BODY MASS INDEX: 39.24 KG/M2 | OXYGEN SATURATION: 98 % | DIASTOLIC BLOOD PRESSURE: 83 MMHG | SYSTOLIC BLOOD PRESSURE: 149 MMHG | HEIGHT: 67 IN | RESPIRATION RATE: 18 BRPM | HEART RATE: 68 BPM | WEIGHT: 250 LBS

## 2025-07-18 DIAGNOSIS — B34.9 ACUTE VIRAL SYNDROME: Primary | ICD-10-CM

## 2025-07-18 LAB
FLUAV RNA SPEC QL NAA+PROBE: NOT DETECTED
FLUBV RNA SPEC QL NAA+PROBE: NOT DETECTED
S PYO DNA THROAT QL NAA+PROBE: NOT DETECTED
SARS-COV-2 RNA RESP QL NAA+PROBE: NOT DETECTED
SOURCE: NORMAL

## 2025-07-18 PROCEDURE — 87651 STREP A DNA AMP PROBE: CPT

## 2025-07-18 PROCEDURE — 99284 EMERGENCY DEPT VISIT MOD MDM: CPT

## 2025-07-18 PROCEDURE — 71046 X-RAY EXAM CHEST 2 VIEWS: CPT

## 2025-07-18 PROCEDURE — 6370000000 HC RX 637 (ALT 250 FOR IP): Performed by: PHYSICIAN ASSISTANT

## 2025-07-18 PROCEDURE — 87636 SARSCOV2 & INF A&B AMP PRB: CPT

## 2025-07-18 RX ORDER — GUAIFENESIN 600 MG/1
600 TABLET, EXTENDED RELEASE ORAL 2 TIMES DAILY
Qty: 30 TABLET | Refills: 0 | Status: SHIPPED | OUTPATIENT
Start: 2025-07-18 | End: 2025-08-02

## 2025-07-18 RX ORDER — IBUPROFEN 600 MG/1
600 TABLET, FILM COATED ORAL
Status: COMPLETED | OUTPATIENT
Start: 2025-07-18 | End: 2025-07-18

## 2025-07-18 RX ORDER — BENZONATATE 100 MG/1
100 CAPSULE ORAL 3 TIMES DAILY PRN
Qty: 30 CAPSULE | Refills: 0 | Status: SHIPPED | OUTPATIENT
Start: 2025-07-18 | End: 2025-07-28

## 2025-07-18 RX ORDER — ACETAMINOPHEN 500 MG
1000 TABLET ORAL 3 TIMES DAILY PRN
Qty: 180 TABLET | Refills: 0 | Status: SHIPPED | OUTPATIENT
Start: 2025-07-18

## 2025-07-18 RX ADMIN — IBUPROFEN 600 MG: 600 TABLET ORAL at 11:46

## 2025-07-18 ASSESSMENT — PAIN SCALES - GENERAL: PAINLEVEL_OUTOF10: 10

## 2025-07-18 ASSESSMENT — PAIN - FUNCTIONAL ASSESSMENT: PAIN_FUNCTIONAL_ASSESSMENT: NONE - DENIES PAIN

## 2025-07-18 NOTE — ED TRIAGE NOTES
Pt arrives to ER POV c/o headache, sore throat, bilateral ear pain and cough that started yesterday.

## 2025-07-18 NOTE — ED PROVIDER NOTES
pharyngitis.  Lungs are clear to auscultation.  Will further evaluate with strep, COVID and flu swabs, chest x-ray.  P.o. ibuprofen for symptomatic relief  Negative workup with no emergent findings.  Patient will continue symptomatic care at home.  Close follow-up with PCP. Return precautions given to and understood by patient. Clinically stable pt, NAD, VSS, d/c home.      Amount and/or Complexity of Data Reviewed  Labs: ordered. Decision-making details documented in ED Course.  Radiology: ordered. Decision-making details documented in ED Course.    Risk  OTC drugs.  Prescription drug management.      Medications   ibuprofen (ADVIL;MOTRIN) tablet 600 mg (600 mg Oral Given 7/18/25 1146)       REASSESSMENT     ED Course as of 07/18/25 1314   Fri Jul 18, 2025   1219 Strep Grp A PCR: Not detected [AO]   1219 SARS-CoV-2, PCR: Not detected [AO]   1219 Rapid Influenza A By PCR: Not detected [AO]   1219 Rapid Influenza B By PCR: Not detected [AO]   1219 XR CHEST (2 VW)  Normal PA and lateral chest views. [AO]      ED Course User Index  [AO] Khalif James PA-C       CONSULTS:  None    PROCEDURES:  Unless otherwise noted below, none     Procedures      FINAL IMPRESSION      1. Acute viral syndrome          DISPOSITION/PLAN   DISPOSITION Decision To Discharge 07/18/2025 12:26:21 PM      PATIENT REFERRED TO:  [facility] [place] [address]  Schedule an appointment as soon as possible for a visit in 2 days        DISCHARGE MEDICATIONS:  Discharge Medication List as of 7/18/2025 12:29 PM        START taking these medications    Details   benzonatate (TESSALON) 100 MG capsule Take 1 capsule by mouth 3 times daily as needed for Cough, Disp-30 capsule, R-0Normal      guaiFENesin (MUCINEX) 600 MG extended release tablet Take 1 tablet by mouth 2 times daily for 15 days, Disp-30 tablet, R-0Normal      acetaminophen (TYLENOL) 500 MG tablet Take 2 tablets by mouth 3 times

## 2025-07-18 NOTE — DISCHARGE INSTRUCTIONS
Stay well hydrated with 32 to 64 ounces of water daily.  Include electrolytes such as Pedialyte, Gatorade, propel.  Cooper diet such as bananas, rice, applesauce, bread.  You would rotate Tylenol and ibuprofen every 4 hours as needed for pain, fever, discomfort.  Call your primary care provider within 24 hours for close outpatient follow-up.  Take medication as prescribed.  Return immediately if any new or worsening symptoms.  Thank you for allowing us to be a part of your care.

## 2025-07-20 DIAGNOSIS — J45.20 MILD INTERMITTENT ASTHMA, UNCOMPLICATED: ICD-10-CM

## 2025-07-21 RX ORDER — ALBUTEROL SULFATE 90 UG/1
2 INHALANT RESPIRATORY (INHALATION) 4 TIMES DAILY
Qty: 6.7 EACH | Refills: 5 | Status: SHIPPED | OUTPATIENT
Start: 2025-07-21

## 2025-07-23 DIAGNOSIS — F41.9 ANXIETY: ICD-10-CM

## 2025-07-23 DIAGNOSIS — F51.04 PSYCHOPHYSIOLOGICAL INSOMNIA: ICD-10-CM

## 2025-07-23 RX ORDER — CLONAZEPAM 2 MG/1
2 TABLET ORAL NIGHTLY PRN
Qty: 21 TABLET | Refills: 0 | Status: SHIPPED | OUTPATIENT
Start: 2025-07-23 | End: 2025-08-13

## 2025-08-04 ENCOUNTER — TELEMEDICINE (OUTPATIENT)
Facility: CLINIC | Age: 45
End: 2025-08-04
Payer: COMMERCIAL

## 2025-08-04 DIAGNOSIS — F51.04 PSYCHOPHYSIOLOGICAL INSOMNIA: ICD-10-CM

## 2025-08-04 DIAGNOSIS — R73.03 PREDIABETES: ICD-10-CM

## 2025-08-04 DIAGNOSIS — F41.9 ANXIETY: ICD-10-CM

## 2025-08-04 PROCEDURE — 99214 OFFICE O/P EST MOD 30 MIN: CPT

## 2025-08-04 RX ORDER — CLONAZEPAM 2 MG/1
2 TABLET ORAL NIGHTLY PRN
Qty: 36 TABLET | Refills: 0 | Status: SHIPPED | OUTPATIENT
Start: 2025-08-04 | End: 2025-09-09

## 2025-08-04 SDOH — ECONOMIC STABILITY: FOOD INSECURITY: WITHIN THE PAST 12 MONTHS, THE FOOD YOU BOUGHT JUST DIDN'T LAST AND YOU DIDN'T HAVE MONEY TO GET MORE.: NEVER TRUE

## 2025-08-04 SDOH — ECONOMIC STABILITY: FOOD INSECURITY: WITHIN THE PAST 12 MONTHS, YOU WORRIED THAT YOUR FOOD WOULD RUN OUT BEFORE YOU GOT MONEY TO BUY MORE.: NEVER TRUE

## 2025-08-04 ASSESSMENT — ENCOUNTER SYMPTOMS
ABDOMINAL PAIN: 0
EYES NEGATIVE: 1
CONSTIPATION: 0
SORE THROAT: 0
RHINORRHEA: 0
SINUS PAIN: 0
COUGH: 0
SHORTNESS OF BREATH: 0
DIARRHEA: 0
ALLERGIC/IMMUNOLOGIC NEGATIVE: 1